# Patient Record
Sex: MALE | Race: WHITE | NOT HISPANIC OR LATINO | Employment: FULL TIME | URBAN - METROPOLITAN AREA
[De-identification: names, ages, dates, MRNs, and addresses within clinical notes are randomized per-mention and may not be internally consistent; named-entity substitution may affect disease eponyms.]

---

## 2017-01-03 ENCOUNTER — OFFICE VISIT (OUTPATIENT)
Dept: FAMILY MEDICINE | Facility: CLINIC | Age: 52
End: 2017-01-03
Payer: COMMERCIAL

## 2017-01-03 VITALS
BODY MASS INDEX: 33.77 KG/M2 | HEART RATE: 88 BPM | WEIGHT: 242 LBS | TEMPERATURE: 98.1 F | DIASTOLIC BLOOD PRESSURE: 86 MMHG | RESPIRATION RATE: 13 BRPM | SYSTOLIC BLOOD PRESSURE: 130 MMHG | OXYGEN SATURATION: 98 %

## 2017-01-03 DIAGNOSIS — M25.571 ACUTE RIGHT ANKLE PAIN: ICD-10-CM

## 2017-01-03 DIAGNOSIS — Z01.818 PREOP GENERAL PHYSICAL EXAM: Primary | ICD-10-CM

## 2017-01-03 DIAGNOSIS — M66.871 TIBIALIS POSTERIOR TENDON TEAR, NONTRAUMATIC, RIGHT: ICD-10-CM

## 2017-01-03 PROCEDURE — 99214 OFFICE O/P EST MOD 30 MIN: CPT | Performed by: NURSE PRACTITIONER

## 2017-01-03 NOTE — PROGRESS NOTES
[facility]  [address] [address]  [phone]  Dept: [phone]    PRE-OP EVALUATION:  Today's date: 1/3/2017    Daniel Schultz (: 1965) presents for pre-operative evaluation assessment as requested by Dr. Avinash Dick.  He requires evaluation and anesthesia risk assessment prior to undergoing surgery/procedure for treatment of torn tendons .  Proposed procedure: Right Ankle, arthoroscopy    Date of Surgery/ Procedure: 17  Time of Surgery/ Procedure: to be determined  Hospital/Surgical Facility: [facility]    Primary Physician: No Fierro  Type of Anesthesia Anticipated: local block    Patient has a Health Care Directive or Living Will:  NO    1. NO - Do you have a history of heart attack, stroke, stent, bypass or surgery on an artery in the head, neck, heart or legs?  2. NO - Do you ever have any pain or discomfort in your chest?  3. NO - Do you have a history of  Heart Failure?  4. NO - Are you troubled by shortness of breath when: walking on the level, up a slight hill or at night?  5. NO - Do you currently have a cold, bronchitis or other respiratory infection?  6. NO - Do you have a cough, shortness of breath or wheezing?  7. NO - Do you sometimes get pains in the calves of your legs when you walk?  8. NO - Do you or anyone in your family have previous history of blood clots?  9. NO - Do you or does anyone in your family have a serious bleeding problem such as prolonged bleeding following surgeries or cuts?  10. NO - Have you ever had problems with anemia or been told to take iron pills?  11. NO - Have you had any abnormal blood loss such as black, tarry or bloody stools, or abnormal vaginal bleeding?  12. NO - Have you ever had a blood transfusion?  13. NO - Have you or any of your relatives ever had problems with anesthesia?  14. NO - Do you have sleep apnea, excessive snoring or daytime drowsiness?  15. NO - Do you have any prosthetic heart  valves?  16. NO - Do you have prosthetic joints?  17. NO - Is there any chance that you may be pregnant?      HPI:                                                      Brief HPI related to upcoming procedure: right ankle pain ongoing for several years- within the past month having increase in pain.   MRI shows evidence of tearing along the posterior tibial tendon behind the medial malleolus.      See problem list for active medical problems.  Problems all longstanding and stable, except as noted/documented.  See ROS for pertinent symptoms related to these conditions.                                                                                                  .    MEDICAL HISTORY:                                                      Patient Active Problem List    Diagnosis Date Noted     Pre-diabetes 07/07/2015     Priority: Medium     JAZMIN (obstructive sleep apnea) 08/18/2015     Severe JAZMIN   - HST on 8/17/2015 with weight 240 lbs, AHI 34, chris desat 86%, strong positional component.  Events labeled as central suspected to be post-arousal and feel it is most consistent with JAZMIN       Nonalcoholic steatohepatitis (WILKINSON) 05/26/2015     Fatty liver seen on US.  Had mild transaminitis       Meniscus tear 09/25/2013     Hyperlipidemia LDL goal <160 12/21/2010      No past medical history on file.  Past Surgical History   Procedure Laterality Date     Hemorrhoidectomy  2000     Internal and external hemorrhoidectomy     Surgical history of -   6/2001     Left Achilles tendon repair     Arthroscopy knee with medial meniscectomy  9/27/2013     Procedure: ARTHROSCOPY KNEE WITH MEDIAL MENISCECTOMY;  Right Knee Arthroscopy Partial Menisectomy and Debridement,Left Knee Arthroscopy Debridement and Loose Body Removal--needs assist.;  Surgeon: Ley, Jeffrey Duane, MD;  Location: [facility] OR     Arthroscopy knee irrigation and debridement  9/27/2013     Procedure: ARTHROSCOPY KNEE IRRIGATION AND DEBRIDEMENT;;  Surgeon: Winston Escoto  Duane, MD;  Location: [facility] OR     Colonoscopy N/A 8/17/2016     Procedure: COLONOSCOPY;  Surgeon: Rudolph Nazario MD;  Location: [facility]     Current Outpatient Prescriptions   Medication Sig Dispense Refill     order for DME Equipment being ordered: CPAP Daniel Schultz received a [employer] DreamStation Auto CPAP Auto. Pressures were set at Auto 8 - 15 cm H2O.       order for DME CAM walker - short 1 Device 0     OTC products: None, except as noted above    Allergies   Allergen Reactions     Nkda [No Known Drug Allergies]       Latex Allergy: NO    Social History   Substance Use Topics     Smoking status: Never Smoker      Smokeless tobacco: Never Used     Alcohol Use: 0.0 oz/week     0 Standard drinks or equivalent per week      Comment: every other week     History   Drug Use No       REVIEW OF SYSTEMS:                                                    Constitutional, neuro, ENT, endocrine, pulmonary, cardiac, gastrointestinal, genitourinary, musculoskeletal, integument and psychiatric systems are negative, except as otherwise noted.    EXAM:                                                    /86 mmHg  Pulse 88  Temp(Src) 98.1  F (36.7  C) (Tympanic)  Resp 13  Wt 242 lb (109.77 kg)  SpO2 98%    GENERAL APPEARANCE: healthy, alert and no distress     EYES: EOMI, - PERRL     HENT: ear canals and TM's normal and nose and mouth without ulcers or lesions     NECK: no adenopathy, no asymmetry, masses, or scars and thyroid normal to palpation     RESP: lungs clear to auscultation - no rales, rhonchi or wheezes     CV: regular rates and rhythm, normal S1 S2, no S3 or S4 and no murmur, click or rub -     MS: extremities normal- no gross deformities noted, no evidence of inflammation in joints, FROM in all extremities.     SKIN: no suspicious lesions or rashes     NEURO: Normal strength and tone, sensory exam grossly normal, mentation intact and speech normal     PSYCH: mentation appears normal.  and affect normal/bright     LYMPHATICS: No axillary, cervical,or supraclavicular nodes    DIAGNOSTICS:                                                    EKG: Not indicated due to non-vascular surgery and low risk of event (age <65 and without cardiac risk factors)    Recent Labs   Lab Test  05/21/15   1004  05/16/15   1012  09/26/13   0900   HGB   --   15.5  15.3   PLT   --   216  213   NA  140  141   --    POTASSIUM  4.1  3.6   --    CR  1.12  1.04   --    A1C  6.1*   --    --         IMPRESSION:                                                    Reason for surgery/procedure: Right Ankle, arthoroscopy  Diagnosis/reason for consult: Preoperative physical     The proposed surgical procedure is considered INTERMEDIATE risk.    REVISED CARDIAC RISK INDEX  The patient has the following serious cardiovascular risks for perioperative complications such as (MI, PE, VFib and 3  AV Block):  No serious cardiac risks  INTERPRETATION: 0 risks: Class I (very low risk - 0.4% complication rate)    The patient has the following additional risks for perioperative complications:  No identified additional risks      ICD-10-CM    1. Preop general physical exam Z01.818    2. Acute right ankle pain M25.571    3. Tibialis posterior tendon tear, nontraumatic, right M66.871        RECOMMENDATIONS:                                                              APPROVAL GIVEN to proceed with proposed procedure, without further diagnostic evaluation       Signed Electronically by: ESPERANZA Acevedo CNP    Copy of this evaluation report is provided to requesting physician.    [facility] Preop Guidelines

## 2017-01-03 NOTE — PATIENT INSTRUCTIONS
Before Your Surgery      Call your surgeon if there is any change in your health. This includes signs of a cold or flu (such as a sore throat, runny nose, cough, rash or fever).    Do not smoke, drink alcohol or take over the counter medicine (unless your surgeon or primary care doctor tells you to) for the 24 hours before and after surgery.    If you take prescribed drugs: Follow your doctor s orders about which medicines to take and which to stop until after surgery.    Eating and drinking prior to surgery: follow the instructions from your surgeon    Take a shower or bath the night before surgery. Use the soap your surgeon gave you to gently clean your skin. If you do not have soap from your surgeon, use your regular soap. Do not shave or scrub the surgery site.  Wear clean pajamas and have clean sheets on your bed.     1. Do not take any aspirin or Ibuprofen/Aleve/Motrin before your surgery          Thank you for choosing [facility].  You may be receiving a survey in the mail from [employer] regarding your visit today.  Please take a few minutes to complete and return the survey to let us know how we are doing.      If you have questions or concerns, please contact us via [employer] or you can contact your care team at [phone].    Our Clinic hours are:  Monday 6:40 am  to 7:00 pm  Tuesday -Friday 6:40 am to 5:00 pm    The [place] outpatient lab hours are:  Monday - Friday 6:10 am to 4:45 pm  Saturdays 7:00 am to 11:00 am  Appointments are required, call [phone]    If you have clinical questions after hours or would like to schedule an appointment,  call the clinic at [phone].

## 2017-01-03 NOTE — MR AVS SNAPSHOT
After Visit Summary   1/3/2017    Daniel Schultz    MRN: [id number]           Patient Information     Date Of Birth          1965        Visit Information        Provider Department      1/3/2017 8:00 AM No Cordova APRN [facility]        Today's Diagnoses     Preop general physical exam    -  1       Care Instructions      Before Your Surgery      Call your surgeon if there is any change in your health. This includes signs of a cold or flu (such as a sore throat, runny nose, cough, rash or fever).    Do not smoke, drink alcohol or take over the counter medicine (unless your surgeon or primary care doctor tells you to) for the 24 hours before and after surgery.    If you take prescribed drugs: Follow your doctor s orders about which medicines to take and which to stop until after surgery.    Eating and drinking prior to surgery: follow the instructions from your surgeon    Take a shower or bath the night before surgery. Use the soap your surgeon gave you to gently clean your skin. If you do not have soap from your surgeon, use your regular soap. Do not shave or scrub the surgery site.  Wear clean pajamas and have clean sheets on your bed.     1. Do not take any aspirin or Ibuprofen/Aleve/Motrin before your surgery          Thank you for choosing [facility].  You may be receiving a survey in the mail from Carmine Castano regarding your visit today.  Please take a few minutes to complete and return the survey to let us know how we are doing.      If you have questions or concerns, please contact us via [employer] or you can contact your care team at [phone].    Our Clinic hours are:  Monday 6:40 am  to 7:00 pm  Tuesday -Friday 6:40 am to 5:00 pm    The [place] outpatient lab hours are:  Monday - Friday 6:10 am to 4:45 pm  Saturdays 7:00 am to 11:00 am  Appointments are required, call [phone]    If you have clinical questions after hours or would like to schedule an  appointment,  call the clinic at [phone].        Follow-ups after your visit        Your next 10 appointments already scheduled     Jan 05, 2017   Procedure with Avinash Dick DPM   [facility] (--)    [address] [address] [phone]           The medical center is located at [address] (between [address] and [address] in [place], [place] miles north of [place]).              Who to contact     If you have questions or need follow up information about today's clinic visit or your schedule please contact [facility] directly at [phone].  Normal or non-critical lab and imaging results will be communicated to you by tritruehart, letter or phone within 4 business days after the clinic has received the results. If you do not hear from us within 7 days, please contact the clinic through [employer] or phone. If you have a critical or abnormal lab result, we will notify you by phone as soon as possible.  Submit refill requests through [employer] or call your pharmacy and they will forward the refill request to us. Please allow 3 business days for your refill to be completed.          Additional Information About Your Visit        tritruehart Information     [employer] gives you secure access to your electronic health record. If you see a primary care provider, you can also send messages to your care team and make appointments. If you have questions, please call your primary care clinic.  If you do not have a primary care provider, please call [phone] and they will assist you.        Your Vitals Were     Pulse Temperature Respirations Pulse Oximetry          88 98.1  F (36.7  C) (Tympanic) 13 98%         Blood Pressure from Last 3 Encounters:   01/03/17 130/86   09/28/16 130/87   08/24/16 117/81    Weight from Last 3 Encounters:   01/03/17 242 lb (109.77 kg)   12/01/16 240 lb (108.863 kg)   10/24/16 240 lb (108.863 kg)              Today, you had the  following     No orders found for display       Primary Care Provider Office Phone # Fax #    No Fierro [phone]       [facility] [address] [address]        Thank you!     Thank you for choosing [facility]  for your care. Our goal is always to provide you with excellent care. Hearing back from our patients is one way we can continue to improve our services. Please take a few minutes to complete the written survey that you may receive in the mail after your visit with us. Thank you!             Your Updated Medication List - Protect others around you: Learn how to safely use, store and throw away your medicines at www.disposemymeds.org.          This list is accurate as of: 1/3/17  8:05 AM.  Always use your most recent med list.                   Brand Name Dispense Instructions for use    order for DME      Equipment being ordered: CPAP Daniel Schultz received a [employer] DreamStation Auto CPAP Auto. Pressures were set at Auto 8 - 15 cm H2O.       order for DME     1 Device    CAM walker - short

## 2017-01-03 NOTE — NURSING NOTE
"Initial /86 mmHg  Pulse 88  Temp(Src) 98.1  F (36.7  C) (Tympanic)  Resp 13  Wt 242 lb (109.77 kg)  SpO2 98% Estimated body mass index is 33.77 kg/(m^2) as calculated from the following:    Height as of 12/1/16: 5' 11\" (1.803 m).    Weight as of this encounter: 242 lb (109.77 kg). .    Martha Hernandez    "

## 2017-01-04 ENCOUNTER — ANESTHESIA EVENT (OUTPATIENT)
Dept: SURGERY | Facility: CLINIC | Age: 52
End: 2017-01-04
Payer: COMMERCIAL

## 2017-01-04 NOTE — ANESTHESIA PREPROCEDURE EVALUATION
Anesthesia Evaluation     . Pt has had prior anesthetic. Type: General and MAC      ROS/MED HX    ENT/Pulmonary:     (+)sleep apnea, , . .    Neurologic:       Cardiovascular:     (+) Dyslipidemia, ----. : . . . :. . Previous cardiac testing Echodate:results:Stress Testdate:5/29/15 results:Interpretation Summary  The patient exercised 10 minutes.  Normal resting wall motion and no stress-induced wall motion abnormality.  No evidence of inducible ischemia on stress test.  Limited views suggest mild right ventricular dilatation with normal RV  systolic function. RVSP cannot be estimated.  ______________________________________________________________________________     Stress  The patient exercised 10 minutes.  Normal blood pressure response to exercise.  The patient exhibited no chest pain during exercise.  Exercise was stopped due to fatigue.  The EKG portion of this stress test was negative for inducible ischemia (see  echo results below).  The visual ejection fraction is estimated at >70%.  Left ventricular cavity size decreases with exercise.  Global LV systolic function augments with exercise.  Normal resting wall motion and no stress-induced wall motion abnormality.  This was a normal stress echocardiogram.     Baseline  The patient is in normal sinus rhythm.  The visual ejection fraction is estimated at 60-65%.  No regional wall motion abnormalities noted.     Stress Results            Protocol:  MANUAL          Maximum Predicted HR:  170 bpm            Target HR: 145 bpm        % Maximum Predicted HR:  98 %     +---------+--------+----------+------+--------------------------------------+  :  Stage  :Duration:Heart Rate:  BPCom                  ment                :  :         :(mm:ss) :  (bpm)   :      :                                      :  +---------+--------+----------+------+--------------------------------------+  : Stage 1 :  3:00 11     0    :142/78:                                       :  +---------+--------+----------+------+--------------------------------------+  : Stage 2 :  3:00 12     2    :142/78:                                      :  +---------+--------+----------+------+--------------------------------------+  : Stage 3 :  3:00 14     6    :156/80:                                      :  +---------+--------+----------+------+--------------------------------------+  :  Peak   :  1:00 16     6    :164/80:Term-leg fatigue; [id number]; FAC-above:  +---------+--------+----------+------+--------------------------------------+  :Recovery :  6:00 92          :126/86:                                      :  +---------+--------+----------+------+--------------------------------------+            Stress Duration:  10:00 mm:ss *        Recovery Time: 6:00 mm:ss        Maximum Stress HR:   166 bpm *ME              TS:          16        LV  Left ventricular systolic function is normal. The visual ejection fraction is  estimated at 60-65%. No regional wall motion abnormalities noted.     RV  The right ventricle is mildly dilated. The right ventricular systolic  function is normal.     AV  The aortic valve is trileaflet. No aortic regurgitation is present. No aortic  stenosis is present. date: results: date: results:          METS/Exercise Tolerance:     Hematologic:         Musculoskeletal:         GI/Hepatic:     (+) liver disease (WILKINSON),       Renal/Genitourinary:         Endo:     (+) Other Endocrine Disorder prediabetes.      Psychiatric:         Infectious Disease:         Malignancy:         Other:                              Anesthesia Plan      History & Physical Review  History and physical reviewed and following examination; no interval change.    ASA Status:  2 .        Plan for General, MAC and Periph. Nerve Block for postop pain   PONV prophylaxis:  Ondansetron (or other 5HT-3) and Dexamethasone or Solumedrol       Postoperative Care  Postoperative pain management:  IV analgesics, Oral  pain medications and Peripheral nerve block (Single Shot).      Consents  Anesthetic plan, risks, benefits and alternatives discussed with:  Patient..                          .

## 2017-01-05 ENCOUNTER — ANESTHESIA (OUTPATIENT)
Dept: SURGERY | Facility: CLINIC | Age: 52
End: 2017-01-05
Payer: COMMERCIAL

## 2017-01-05 PROCEDURE — 25000564 ZZH DESFLURANE, EA 15 MIN: Performed by: NURSE ANESTHETIST, CERTIFIED REGISTERED

## 2017-01-05 PROCEDURE — 25000125 ZZHC RX 250: Performed by: PODIATRIST

## 2017-01-05 PROCEDURE — 25000125 ZZHC RX 250: Performed by: NURSE ANESTHETIST, CERTIFIED REGISTERED

## 2017-01-05 RX ORDER — FENTANYL CITRATE 50 UG/ML
INJECTION, SOLUTION INTRAMUSCULAR; INTRAVENOUS PRN
Status: DISCONTINUED | OUTPATIENT
Start: 2017-01-05 | End: 2017-01-05

## 2017-01-05 RX ORDER — ROPIVACAINE HYDROCHLORIDE 5 MG/ML
INJECTION, SOLUTION EPIDURAL; INFILTRATION; PERINEURAL PRN
Status: DISCONTINUED | OUTPATIENT
Start: 2017-01-05 | End: 2017-01-05

## 2017-01-05 RX ORDER — DEXAMETHASONE SODIUM PHOSPHATE 4 MG/ML
INJECTION, SOLUTION INTRA-ARTICULAR; INTRALESIONAL; INTRAMUSCULAR; INTRAVENOUS; SOFT TISSUE PRN
Status: DISCONTINUED | OUTPATIENT
Start: 2017-01-05 | End: 2017-01-05

## 2017-01-05 RX ORDER — LIDOCAINE HYDROCHLORIDE 10 MG/ML
INJECTION, SOLUTION INFILTRATION; PERINEURAL PRN
Status: DISCONTINUED | OUTPATIENT
Start: 2017-01-05 | End: 2017-01-05

## 2017-01-05 RX ORDER — PROPOFOL 10 MG/ML
INJECTION, EMULSION INTRAVENOUS PRN
Status: DISCONTINUED | OUTPATIENT
Start: 2017-01-05 | End: 2017-01-05

## 2017-01-05 RX ORDER — LIDOCAINE HYDROCHLORIDE 10 MG/ML
INJECTION, SOLUTION EPIDURAL; INFILTRATION; INTRACAUDAL; PERINEURAL PRN
Status: DISCONTINUED | OUTPATIENT
Start: 2017-01-05 | End: 2017-01-05

## 2017-01-05 RX ORDER — ONDANSETRON 2 MG/ML
INJECTION INTRAMUSCULAR; INTRAVENOUS PRN
Status: DISCONTINUED | OUTPATIENT
Start: 2017-01-05 | End: 2017-01-05

## 2017-01-05 RX ORDER — LIDOCAINE HCL/EPINEPHRINE/PF 2%-1:200K
VIAL (ML) INJECTION PRN
Status: DISCONTINUED | OUTPATIENT
Start: 2017-01-05 | End: 2017-01-05

## 2017-01-05 RX ADMIN — MIDAZOLAM HYDROCHLORIDE 2 MG: 1 INJECTION, SOLUTION INTRAMUSCULAR; INTRAVENOUS at 13:58

## 2017-01-05 RX ADMIN — LIDOCAINE HYDROCHLORIDE 3 ML: 10 INJECTION, SOLUTION EPIDURAL; INFILTRATION; INTRACAUDAL; PERINEURAL at 12:56

## 2017-01-05 RX ADMIN — LIDOCAINE HYDROCHLORIDE 50 MG: 10 INJECTION, SOLUTION INFILTRATION; PERINEURAL at 14:02

## 2017-01-05 RX ADMIN — PROPOFOL 200 MG: 10 INJECTION, EMULSION INTRAVENOUS at 14:02

## 2017-01-05 RX ADMIN — ROPIVACAINE HYDROCHLORIDE 15 ML: 5 INJECTION, SOLUTION EPIDURAL; INFILTRATION; PERINEURAL at 13:00

## 2017-01-05 RX ADMIN — ROPIVACAINE HYDROCHLORIDE 10 ML: 5 INJECTION, SOLUTION EPIDURAL; INFILTRATION; PERINEURAL at 13:03

## 2017-01-05 RX ADMIN — CEFAZOLIN SODIUM 2 G: 2 INJECTION, SOLUTION INTRAVENOUS at 14:00

## 2017-01-05 RX ADMIN — MIDAZOLAM HYDROCHLORIDE 1 MG: 1 INJECTION, SOLUTION INTRAMUSCULAR; INTRAVENOUS at 12:56

## 2017-01-05 RX ADMIN — LIDOCAINE HYDROCHLORIDE,EPINEPHRINE BITARTRATE 5 ML: 20; .005 INJECTION, SOLUTION EPIDURAL; INFILTRATION; INTRACAUDAL; PERINEURAL at 12:59

## 2017-01-05 RX ADMIN — MIDAZOLAM HYDROCHLORIDE 2 MG: 1 INJECTION, SOLUTION INTRAMUSCULAR; INTRAVENOUS at 12:53

## 2017-01-05 RX ADMIN — DEXAMETHASONE SODIUM PHOSPHATE 4 MG: 4 INJECTION, SOLUTION INTRAMUSCULAR; INTRAVENOUS at 14:07

## 2017-01-05 RX ADMIN — FENTANYL CITRATE 150 MCG: 50 INJECTION, SOLUTION INTRAMUSCULAR; INTRAVENOUS at 14:02

## 2017-01-05 RX ADMIN — ONDANSETRON 4 MG: 2 INJECTION INTRAMUSCULAR; INTRAVENOUS at 14:59

## 2017-01-05 RX ADMIN — FENTANYL CITRATE 100 MCG: 50 INJECTION, SOLUTION INTRAMUSCULAR; INTRAVENOUS at 12:53

## 2017-01-05 RX ADMIN — ROPIVACAINE HYDROCHLORIDE 15 ML: 5 INJECTION, SOLUTION EPIDURAL; INFILTRATION; PERINEURAL at 13:01

## 2017-01-05 NOTE — ANESTHESIA PROCEDURE NOTES
Peripheral nerve/Neuraxial procedure note : Saphenous, Sciatic and Popliteal  Pre-Procedure  Performed by  COSME VALENZUELA   Location: pre-op      Pre-Anesthestic Checklist: patient identified, IV checked, site marked, risks and benefits discussed, informed consent, monitors and equipment checked, pre-op evaluation, at physician/surgeon's request and post-op pain management    Timeout  Correct Patient: Yes   Correct Procedure: Yes   Correct Site: Yes   Correct Laterality: Yes   Correct Position: Yes   Site Marked: Yes   .   Procedure Documentation    Diagnosis:POST OP PAIN.    Procedure:    Saphenous, Sciatic and Popliteal.  Local skin infiltrated with 3 mL of 1% lidocaine.     Ultrasound used to identify targeted nerve, plexus, or vascular marker and placed a needle adjacent to it. A permanent image is entered into the patient's record.  Patient Prep;mask, sterile gloves, chlorhexidine gluconate and isopropyl alcohol, patient draped.  Nerve Stim: Initial Level 1 mA.  Lowest motor response 0.38 mA..  Needle: insulated (21 G. 4 in). .  Spinal Needle: . . Insertion Method: Single Shot.     Assessment/Narrative  Paresthesias: No.  Injection made incrementally with aspirations every 5 mL..  The placement was negative for: blood aspirated, painful injection and site bleeding.  Bolus given via needle..   Secured via.   Complications: none. Test dose of 5 mL lidocaine 2% w/ 1:200,000 epinephrine at 12:59.  Test dose negative for signs of intravascular, subdural or intrathecal injection.

## 2017-01-05 NOTE — ANESTHESIA POSTPROCEDURE EVALUATION
Patient: Daniel Schultz    COMBINED ARTHROSCOPY ANKLE, OPEN REPAIR LIGAMENT (Right Ankle)  Additional InformationProcedure(s):  Right Ankle Arthroscopic Debridement,Lateral Ligament Repair,Posterior Tibial Tendon Repair - Wound Class: I-Clean    Diagnosis:right ankle instability,impingement  Diagnosis Additional Information: No value filed.    Anesthesia Type:  General, MAC, Periph. Nerve Block for postop pain    Note:  Anesthesia Post Evaluation    Patient location during evaluation: Phase 2 and Bedside  Patient participation: Able to participate in evaluation but full recovery from regional anesthesia has not yet ocurrred but is anticipated to occur within 48 hours  Level of consciousness: awake and alert  Pain management: adequate  Airway patency: patent  Cardiovascular status: acceptable  Respiratory status: acceptable  Hydration status: acceptable  PONV: none     Anesthetic complications: None          Last vitals:  Filed Vitals:    01/05/17 1545 01/05/17 1611 01/05/17 1645   BP: 138/85 137/87 139/90   Pulse:  85 84   Temp:      Resp: 16 16 16   SpO2: 98% 99% 98%       Electronically Signed By: ESPERANZA Capone CRNA  January 5, 2017  5:27 PM

## 2017-01-05 NOTE — ANESTHESIA CARE TRANSFER NOTE
Patient: Daniel Schultz    COMBINED ARTHROSCOPY ANKLE, OPEN REPAIR LIGAMENT (Right Ankle)  Additional InformationProcedure(s):  Right Ankle Arthroscopic Debridement,Lateral Ligament Repair,Posterior Tibial Tendon Repair - Wound Class: I-Clean    Diagnosis: right ankle instability,impingement  Diagnosis Additional Information: No value filed.    Anesthesia Type:   General, MAC, Periph. Nerve Block for postop pain     Note:  Airway :Nasal Cannula  Patient transferred to:PACU        Vitals: (Last set prior to Anesthesia Care Transfer)              Electronically Signed By: ESPERANZA Pace CRNA  January 5, 2017  3:25 PM

## 2017-01-19 ENCOUNTER — TRANSFERRED RECORDS (OUTPATIENT)
Dept: HEALTH INFORMATION MANAGEMENT | Facility: CLINIC | Age: 52
End: 2017-01-19

## 2017-02-15 ENCOUNTER — HOSPITAL ENCOUNTER (OUTPATIENT)
Dept: PHYSICAL THERAPY | Facility: CLINIC | Age: 52
Setting detail: THERAPIES SERIES
End: 2017-02-15
Attending: PODIATRIST
Payer: COMMERCIAL

## 2017-02-15 PROCEDURE — 97140 MANUAL THERAPY 1/> REGIONS: CPT | Mod: GP | Performed by: PHYSICAL THERAPIST

## 2017-02-15 PROCEDURE — 97161 PT EVAL LOW COMPLEX 20 MIN: CPT | Mod: GP | Performed by: PHYSICAL THERAPIST

## 2017-02-15 PROCEDURE — 97110 THERAPEUTIC EXERCISES: CPT | Mod: GP | Performed by: PHYSICAL THERAPIST

## 2017-02-15 PROCEDURE — 40000718 ZZHC STATISTIC PT DEPARTMENT ORTHO VISIT: Performed by: PHYSICAL THERAPIST

## 2017-02-15 NOTE — PROGRESS NOTES
02/15/17 0800   General Information   Type of Visit Initial OP Ortho PT Evaluation   Start of Care Date 02/15/17   Referring Physician Dr. 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   Orders Evaluate and Treat   Date of Order 02/13/17   Insurance Type [employer]   Insurance Comments/Visits Authorized send auth 2 w prior to 21st visit   Medical Diagnosis Post op R Tibialis Posterior Tendon Repair   Surgical/Medical history reviewed Yes   Precautions/Limitations no known precautions/limitations   Weight-Bearing Status - RLE weight-bearing as tolerated   Body Part(s)   Body Part(s) Ankle/Foot   Presentation and Etiology   Pertinent history of current problem (include personal factors and/or comorbidities that impact the POC) Pt post op R Posterior Tendon Repair from 1/5/2017. Pt currently WBAT on RLE. 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 Pt states he saw Mayelin this past Monday, cleared for PT. d/c boot and just wearing the brace within his sneaker. Pt works as a , working mostly at a desk, however does have to walk when at scenes. 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   Impairments A. Pain;B. Decreased WB tolerance;C. Swelling;D. Decreased ROM;F. Decreased strength and endurance;E. Decreased flexibility;H. Impaired gait;K. Numbness   Functional Limitations perform activities of daily living;perform required work activities;perform desired leisure / sports activities   Symptom Location posterior ankle pain   How/Where did it occur Other  (post operative)   Onset date of current episode/exacerbation 01/05/17   Chronicity New   Pain rating (0-10 point scale) Best (/10);Worst (/10)   Best (/10) 2   Worst (/10) 6   Pain quality A. Sharp;B. Dull;C. Aching   Frequency of pain/symptoms A. Constant   Pain/symptoms exacerbated by B. Walking;G. Certain positions;J. ADL;K. Home tasks;L. Work tasks    Pain/symptoms eased by C. Rest;E. Changing positions;G. Heat;H. Cold;J. Braces/supports   Prior Level of Function   Prior Level of Function-Mobility Pt was able to ambulate without an assistive device or braces. Pt was also able to ambulate with being limited secondary to pain.   Current Level of Function   Patient role/employment history A. Employed   Employment Comments Pt states most of the time he works at a desk, pt states he does have to go to scenes and drive around. Pt states he went back to work right away.    Living environment House/townhome   Home/community accessibility a flight, split entry home. Pt states no concerns with ascending/ descending stairs.   Fall Risk Screen   Fall screen completed by PT   Per patient - Fall 2 or more times in past year? No   Per patient - Fall with injury in past year? No   Is patient a fall risk? No   Ankle/Foot Objective Findings   Side (if bilateral, select both right and left) Right   Gait/Locomotion Pt ambulates with lateral lean towards the right, decreased foot clearance and step length on the R   Right DF (Knee Ext) AROM 3* R. 5* L   Right PF AROM 39* R.  45L   Right Calcanceal Inversion AROM 20*   Right Calcaneal Eversion AROM 22*   Right DF/Inversion Strength 4-/5   Right DF/Eversion Strength 4-/5; increased pain   Right PF/Inversion Strength 4-/5; increased pain   Right PF/Eversion Strength 4-/5; increased pain   Right PF Strength 4-/5; increased pain   Palpation Pain upon palpation of surgical incision on lateral aspect of foot.   Integumentary 57 cm R. 56 cm L w/ figure 8 measure. 25cm R in toe box, 24 cm L   Right Great Toe Flexion AROM 15*   Accessory Motion/Joint Mobility Increased tightness in talocrural joint.   Planned Therapy Interventions   Planned Therapy Interventions IADL retraining;balance training;gait training;joint mobilization;manual therapy;neuromuscular re-education;ROM;strengthening;stretching   Clinical Impression   Criteria for Skilled  Therapeutic Interventions Met yes, treatment indicated   PT Diagnosis Decreased Strength and ROM secondary to post op Posterior Tibialis Tendon Repair   Influenced by the following impairments decreased ROM, decreased strength   Functional limitations due to impairments decreased independent mobility, decreased tolerance for standing and ambulating   Clinical Presentation Stable/Uncomplicated   Clinical Presentation Rationale Pt presents with decreased ROM and Strength consisntet with post operative status. Pt presents with stable comorbidities and is motivated to participate with PT   Clinical Decision Making (Complexity) Low complexity   Therapy Frequency 2 times/Week   Predicted Duration of Therapy Intervention (days/wks) 6 weeks   Risk & Benefits of therapy have been explained Yes   Patient, Family & other staff in agreement with plan of care Yes   Education Assessment   Preferred Learning Style Pictures/video   Barriers to Learning No barriers   ORTHO GOALS   PT Ortho Eval Goals 1;2;3   Ortho Goal 1   Goal Identifier HEP   Goal Description Pt will be able to demonstrate proper form and duration of exercises in order to progress independence with HEP   Target Date 03/15/17   Ortho Goal 2   Goal Identifier Strength   Goal Description Pt will demonstrate atleast 4+/5 strength in order to ambulate without gait deviations.   Target Date 03/29/17   Ortho Goal 3   Goal Identifier Work, coaching   Goal Description Pt will be able to stand and walk > 1 hour in order to participate with work activities, walking and ambulating with <2/10 pain during mobility.   Target Date 03/29/17   Total Evaluation Time   Total Evaluation Time 30

## 2017-02-21 ENCOUNTER — HOSPITAL ENCOUNTER (OUTPATIENT)
Dept: PHYSICAL THERAPY | Facility: CLINIC | Age: 52
Setting detail: THERAPIES SERIES
End: 2017-02-21
Attending: PODIATRIST
Payer: COMMERCIAL

## 2017-02-21 PROCEDURE — 97110 THERAPEUTIC EXERCISES: CPT | Mod: GP | Performed by: PHYSICAL THERAPIST

## 2017-02-21 PROCEDURE — 97140 MANUAL THERAPY 1/> REGIONS: CPT | Mod: GP | Performed by: PHYSICAL THERAPIST

## 2017-02-21 PROCEDURE — 40000718 ZZHC STATISTIC PT DEPARTMENT ORTHO VISIT: Performed by: PHYSICAL THERAPIST

## 2017-02-23 ENCOUNTER — HOSPITAL ENCOUNTER (OUTPATIENT)
Dept: PHYSICAL THERAPY | Facility: CLINIC | Age: 52
Setting detail: THERAPIES SERIES
End: 2017-02-23
Attending: PODIATRIST
Payer: COMMERCIAL

## 2017-02-23 PROCEDURE — 97140 MANUAL THERAPY 1/> REGIONS: CPT | Mod: GP | Performed by: PHYSICAL THERAPIST

## 2017-02-23 PROCEDURE — 97112 NEUROMUSCULAR REEDUCATION: CPT | Mod: GP | Performed by: PHYSICAL THERAPIST

## 2017-02-23 PROCEDURE — 40000718 ZZHC STATISTIC PT DEPARTMENT ORTHO VISIT: Performed by: PHYSICAL THERAPIST

## 2017-02-27 ENCOUNTER — HOSPITAL ENCOUNTER (OUTPATIENT)
Dept: PHYSICAL THERAPY | Facility: CLINIC | Age: 52
Setting detail: THERAPIES SERIES
End: 2017-02-27
Attending: PODIATRIST
Payer: COMMERCIAL

## 2017-02-27 PROCEDURE — 40000718 ZZHC STATISTIC PT DEPARTMENT ORTHO VISIT: Performed by: PHYSICAL THERAPIST

## 2017-02-27 PROCEDURE — 97112 NEUROMUSCULAR REEDUCATION: CPT | Mod: GP | Performed by: PHYSICAL THERAPIST

## 2017-02-27 PROCEDURE — 97140 MANUAL THERAPY 1/> REGIONS: CPT | Mod: GP | Performed by: PHYSICAL THERAPIST

## 2017-03-01 ENCOUNTER — HOSPITAL ENCOUNTER (OUTPATIENT)
Dept: PHYSICAL THERAPY | Facility: CLINIC | Age: 52
Setting detail: THERAPIES SERIES
End: 2017-03-01
Attending: PODIATRIST
Payer: COMMERCIAL

## 2017-03-01 PROCEDURE — 97112 NEUROMUSCULAR REEDUCATION: CPT | Mod: GP | Performed by: PHYSICAL THERAPIST

## 2017-03-01 PROCEDURE — 40000718 ZZHC STATISTIC PT DEPARTMENT ORTHO VISIT: Performed by: PHYSICAL THERAPIST

## 2017-03-01 PROCEDURE — 97110 THERAPEUTIC EXERCISES: CPT | Mod: GP | Performed by: PHYSICAL THERAPIST

## 2017-03-23 ENCOUNTER — HOSPITAL ENCOUNTER (OUTPATIENT)
Dept: PHYSICAL THERAPY | Facility: CLINIC | Age: 52
Setting detail: THERAPIES SERIES
End: 2017-03-23
Attending: PODIATRIST
Payer: COMMERCIAL

## 2017-03-23 PROCEDURE — 97112 NEUROMUSCULAR REEDUCATION: CPT | Mod: GP | Performed by: PHYSICAL THERAPIST

## 2017-03-23 PROCEDURE — 40000718 ZZHC STATISTIC PT DEPARTMENT ORTHO VISIT: Performed by: PHYSICAL THERAPIST

## 2017-03-23 PROCEDURE — 97140 MANUAL THERAPY 1/> REGIONS: CPT | Mod: GP | Performed by: PHYSICAL THERAPIST

## 2017-03-28 ENCOUNTER — HOSPITAL ENCOUNTER (OUTPATIENT)
Dept: PHYSICAL THERAPY | Facility: CLINIC | Age: 52
Setting detail: THERAPIES SERIES
End: 2017-03-28
Attending: PODIATRIST
Payer: COMMERCIAL

## 2017-03-28 PROCEDURE — 97112 NEUROMUSCULAR REEDUCATION: CPT | Mod: GP | Performed by: PHYSICAL THERAPIST

## 2017-03-28 PROCEDURE — 97110 THERAPEUTIC EXERCISES: CPT | Mod: GP | Performed by: PHYSICAL THERAPIST

## 2017-03-28 PROCEDURE — 40000718 ZZHC STATISTIC PT DEPARTMENT ORTHO VISIT: Performed by: PHYSICAL THERAPIST

## 2017-04-19 ENCOUNTER — HOSPITAL ENCOUNTER (OUTPATIENT)
Dept: PHYSICAL THERAPY | Facility: CLINIC | Age: 52
Setting detail: THERAPIES SERIES
End: 2017-04-19
Attending: PODIATRIST
Payer: COMMERCIAL

## 2017-04-19 PROCEDURE — 40000718 ZZHC STATISTIC PT DEPARTMENT ORTHO VISIT: Performed by: PHYSICAL THERAPIST

## 2017-04-19 PROCEDURE — 97112 NEUROMUSCULAR REEDUCATION: CPT | Mod: GP | Performed by: PHYSICAL THERAPIST

## 2017-04-19 PROCEDURE — 97110 THERAPEUTIC EXERCISES: CPT | Mod: GP | Performed by: PHYSICAL THERAPIST

## 2017-06-08 ENCOUNTER — OFFICE VISIT (OUTPATIENT)
Dept: FAMILY MEDICINE | Facility: CLINIC | Age: 52
End: 2017-06-08
Payer: COMMERCIAL

## 2017-06-08 VITALS
WEIGHT: 218 LBS | SYSTOLIC BLOOD PRESSURE: 123 MMHG | DIASTOLIC BLOOD PRESSURE: 86 MMHG | TEMPERATURE: 96.7 F | HEIGHT: 71 IN | HEART RATE: 71 BPM | BODY MASS INDEX: 30.52 KG/M2

## 2017-06-08 DIAGNOSIS — M79.675 PAIN OF TOE OF LEFT FOOT: Primary | ICD-10-CM

## 2017-06-08 DIAGNOSIS — R73.03 PREDIABETES: ICD-10-CM

## 2017-06-08 LAB
CRP SERPL-MCNC: 7.7 MG/L (ref 0–8)
HBA1C MFR BLD: 5.8 % (ref 4.3–6)
URATE SERPL-MCNC: 7.5 MG/DL (ref 3.5–7.2)
WBC # BLD AUTO: 6.2 10E9/L (ref 4–11)

## 2017-06-08 PROCEDURE — 36415 COLL VENOUS BLD VENIPUNCTURE: CPT | Performed by: NURSE PRACTITIONER

## 2017-06-08 PROCEDURE — 86140 C-REACTIVE PROTEIN: CPT | Performed by: NURSE PRACTITIONER

## 2017-06-08 PROCEDURE — 84550 ASSAY OF BLOOD/URIC ACID: CPT | Performed by: NURSE PRACTITIONER

## 2017-06-08 PROCEDURE — 83036 HEMOGLOBIN GLYCOSYLATED A1C: CPT | Performed by: NURSE PRACTITIONER

## 2017-06-08 PROCEDURE — 99214 OFFICE O/P EST MOD 30 MIN: CPT | Performed by: NURSE PRACTITIONER

## 2017-06-08 PROCEDURE — 85048 AUTOMATED LEUKOCYTE COUNT: CPT | Performed by: NURSE PRACTITIONER

## 2017-06-08 RX ORDER — PREDNISONE 10 MG/1
30 TABLET ORAL DAILY
Qty: 15 TABLET | Refills: 0 | Status: SHIPPED | OUTPATIENT
Start: 2017-06-08 | End: 2017-06-13

## 2017-06-08 RX ORDER — COLCHICINE 0.6 MG/1
TABLET ORAL
Qty: 30 TABLET | Refills: 0 | Status: SHIPPED | OUTPATIENT
Start: 2017-06-08 | End: 2018-04-23

## 2017-06-08 NOTE — NURSING NOTE
"Chief Complaint   Patient presents with     Toe Pain     x2 weeks, no known injury.        Initial /86  Pulse 71  Temp 96.7  F (35.9  C) (Tympanic)  Ht 5' 11\" (1.803 m)  Wt 218 lb (98.9 kg)  BMI 30.4 kg/m2 Estimated body mass index is 30.4 kg/(m^2) as calculated from the following:    Height as of this encounter: 5' 11\" (1.803 m).    Weight as of this encounter: 218 lb (98.9 kg).  Medication Reconciliation: complete  "

## 2017-06-08 NOTE — MR AVS SNAPSHOT
After Visit Summary   6/8/2017    Daniel Schultz    MRN: [id number]           Patient Information     Date Of Birth          1965        Visit Information        Provider Department      6/8/2017 9:00 AM Rosa Locke APRN [facility]        Today's Diagnoses     Pain of toe of left foot    -  1    Prediabetes          Care Instructions    Colchicine       -2 tabs at the onset of flare, then 1 tab every 2 hrs until pain is better or diarrhea occurs, up to 10 doses. Wait 3 days until taking again.   If no improvement in 2 days start Prednisone 30 mg daily, take it with food.     Labs: WBC, CRP (inflammatory marker) and uric acid                  Follow-ups after your visit        Who to contact     If you have questions or need follow up information about today's clinic visit or your schedule please contact [facility] directly at [phone].  Normal or non-critical lab and imaging results will be communicated to you by MyChart, letter or phone within 4 business days after the clinic has received the results. If you do not hear from us within 7 days, please contact the clinic through [facility] or phone. If you have a critical or abnormal lab result, we will notify you by phone as soon as possible.  Submit refill requests through [facility] or call your pharmacy and they will forward the refill request to us. Please allow 3 business days for your refill to be completed.          Additional Information About Your Visit        MyChart Information     [facility] gives you secure access to your electronic health record. If you see a primary care provider, you can also send messages to your care team and make appointments. If you have questions, please call your primary care clinic.  If you do not have a primary care provider, please call [phone] and they will assist you.        Care EveryWhere ID     This is your Care EveryWhere ID. This could be used by other  "organizations to access your [place] medical records  [id number]        Your Vitals Were     Pulse Temperature Height BMI (Body Mass Index)          71 96.7  F (35.9  C) (Tympanic) 5' 11\" (1.803 m) 30.4 kg/m2         Blood Pressure from Last 3 Encounters:   06/08/17 123/86   01/05/17 139/90   01/03/17 130/86    Weight from Last 3 Encounters:   06/08/17 218 lb (98.9 kg)   01/03/17 242 lb (109.8 kg)   12/01/16 240 lb (108.9 kg)              We Performed the Following     CRP inflammation     Hemoglobin A1c     Uric acid     WBC count          Today's Medication Changes          These changes are accurate as of: 6/8/17  9:27 AM.  If you have any questions, ask your nurse or doctor.               Start taking these medicines.        Dose/Directions    colchicine 0.6 MG tablet   Commonly known as:  COLCRYS   Used for:  Pain of toe of left foot   Started by:  Rosa Locke APRN CNP        2 tabs at the onset of flare, then 1 tab every 2 hrs until pain is better or diarrhea occurs, up to 10 doses. Wait 3 days until taking again   Quantity:  30 tablet   Refills:  0       predniSONE 10 MG tablet   Commonly known as:  DELTASONE   Used for:  Pain of toe of left foot   Started by:  Rosa Locke APRN CNP        Dose:  30 mg   Take 3 tablets (30 mg) by mouth daily for 5 days   Quantity:  15 tablet   Refills:  0            Where to get your medicines      These medications were sent to [facility] [address]  [address] [address]     Phone:  [phone]     colchicine 0.6 MG tablet    predniSONE 10 MG tablet                Primary Care Provider Office Phone # Fax #    No Mile Fierro [phone]       [facility] [address] [address]        Thank you!     Thank you for choosing [facility]  for your care. Our goal is always to provide you with excellent care. Hearing back from our patients " is one way we can continue to improve our services. Please take a few minutes to complete the written survey that you may receive in the mail after your visit with us. Thank you!             Your Updated Medication List - Protect others around you: Learn how to safely use, store and throw away your medicines at www.disposemymeds.org.          This list is accurate as of: 6/8/17  9:27 AM.  Always use your most recent med list.                   Brand Name Dispense Instructions for use    colchicine 0.6 MG tablet    COLCRYS    30 tablet    2 tabs at the onset of flare, then 1 tab every 2 hrs until pain is better or diarrhea occurs, up to 10 doses. Wait 3 days until taking again       order for DME      Equipment being ordered: CPAP Daniel Schultz received a [employer]pir[employer] DreamStation Auto CPAP Auto. Pressures were set at Auto 8 - 15 cm H2O.       order for DME     1 Device    CAM walker - short       predniSONE 10 MG tablet    DELTASONE    15 tablet    Take 3 tablets (30 mg) by mouth daily for 5 days

## 2017-06-08 NOTE — PROGRESS NOTES
"  SUBJECTIVE:                                                    Daniel Schultz is a 52 year old male who presents to clinic today for the following health issues: pain in left great toe for about 2 weeks, had similar pain before but it went away on its own in 2 days.     Concern - Left big toe Pain      Onset: 2 weeks     Description:   Swollen, hurts to apply pressure on it, keeps him awake at night     Intensity: 9/10    Progression of Symptoms:  worsening    Accompanying Signs & Symptoms:         Previous history of similar problem:   Had some pain before in the toe before but it went away after 2 days     Precipitating factors:   Worsened by: Walking    Alleviating factors:  Improved by: Nothing        Therapies Tried and outcome: Cherry tarte     Problem list and histories reviewed & adjusted, as indicated.  Additional history: as documented    Labs reviewed in EPIC    Reviewed and updated as needed this visit by clinical staff  Tobacco  Allergies  Med Hx  Surg Hx  Fam Hx  Soc Hx      Reviewed and updated as needed this visit by Provider         ROS:  Constitutional, HEENT, cardiovascular, pulmonary, gi and gu systems are negative, except as otherwise noted.    OBJECTIVE:                                                    /86  Pulse 71  Temp 96.7  F (35.9  C) (Tympanic)  Ht 5' 11\" (1.803 m)  Wt 218 lb (98.9 kg)  BMI 30.4 kg/m2  Body mass index is 30.4 kg/(m^2).  GENERAL: healthy, alert and no distress  MS: left great toe MTP joint moderate swelling and erythema, warm to touch.   PSYCH: mentation appears normal, affect normal/bright    Diagnostic Test Results:  WBC, CRP, uric acid, X1B-oeycybs      ASSESSMENT/PLAN:                                                      1. Pain of toe of left foot  -symptoms are consistent with gout flare  - WBC count  - CRP inflammation  - Uric acid  - colchicine (COLCRYS) 0.6 MG tablet; 2 tabs at the onset of flare, then 1 tab every 2 hrs until pain is better " or diarrhea occurs, up to 10 doses. Wait 3 days until taking again  Dispense: 30 tablet; Refill: 0  -if no improvement with colchicine in 2-3 days start Prednisone   - predniSONE (DELTASONE) 10 MG tablet; Take 3 tablets (30 mg) by mouth daily for 5 days  Dispense: 15 tablet; Refill: 0    2. Prediabetes  - Hemoglobin A1c  -A1C improved  -continue with diet and exercise    See Patient Instructions    ESPERANZA [facility]

## 2017-06-08 NOTE — PATIENT INSTRUCTIONS
Colchicine       -2 tabs at the onset of flare, then 1 tab every 2 hrs until pain is better or diarrhea occurs, up to 10 doses. Wait 3 days until taking again.   If no improvement in 2 days start Prednisone 30 mg daily, take it with food.     Labs: WBC, CRP (inflammatory marker) and uric acid

## 2017-10-12 ENCOUNTER — TRANSFERRED RECORDS (OUTPATIENT)
Dept: HEALTH INFORMATION MANAGEMENT | Facility: CLINIC | Age: 52
End: 2017-10-12

## 2017-10-17 ENCOUNTER — HOSPITAL ENCOUNTER (OUTPATIENT)
Dept: MRI IMAGING | Facility: CLINIC | Age: 52
Discharge: HOME | End: 2017-10-17
Attending: PODIATRIST | Admitting: PODIATRIST
Payer: COMMERCIAL

## 2017-10-17 DIAGNOSIS — M25.571 RIGHT ANKLE PAIN, UNSPECIFIED CHRONICITY: ICD-10-CM

## 2017-10-17 PROCEDURE — 73721 MRI JNT OF LWR EXTRE W/O DYE: CPT | Mod: RT

## 2017-10-26 ENCOUNTER — TRANSFERRED RECORDS (OUTPATIENT)
Dept: HEALTH INFORMATION MANAGEMENT | Facility: CLINIC | Age: 52
End: 2017-10-26

## 2018-01-11 ENCOUNTER — OFFICE VISIT (OUTPATIENT)
Dept: FAMILY MEDICINE | Facility: CLINIC | Age: 53
End: 2018-01-11
Payer: COMMERCIAL

## 2018-01-11 VITALS
HEART RATE: 77 BPM | WEIGHT: 223 LBS | HEIGHT: 70 IN | TEMPERATURE: 97.8 F | BODY MASS INDEX: 31.92 KG/M2 | DIASTOLIC BLOOD PRESSURE: 80 MMHG | SYSTOLIC BLOOD PRESSURE: 121 MMHG

## 2018-01-11 DIAGNOSIS — Z01.818 PREOP GENERAL PHYSICAL EXAM: Primary | ICD-10-CM

## 2018-01-11 LAB
BASOPHILS # BLD AUTO: 0.1 10E9/L (ref 0–0.2)
BASOPHILS NFR BLD AUTO: 0.7 %
CREAT SERPL-MCNC: 0.98 MG/DL (ref 0.66–1.25)
DIFFERENTIAL METHOD BLD: NORMAL
EOSINOPHIL # BLD AUTO: 0.4 10E9/L (ref 0–0.7)
EOSINOPHIL NFR BLD AUTO: 5.6 %
ERYTHROCYTE [DISTWIDTH] IN BLOOD BY AUTOMATED COUNT: 12.4 % (ref 10–15)
GFR SERPL CREATININE-BSD FRML MDRD: 80 ML/MIN/1.7M2
HCT VFR BLD AUTO: 44.9 % (ref 40–53)
HGB BLD-MCNC: 15.5 G/DL (ref 13.3–17.7)
LYMPHOCYTES # BLD AUTO: 2.3 10E9/L (ref 0.8–5.3)
LYMPHOCYTES NFR BLD AUTO: 30.1 %
MCH RBC QN AUTO: 31.1 PG (ref 26.5–33)
MCHC RBC AUTO-ENTMCNC: 34.5 G/DL (ref 31.5–36.5)
MCV RBC AUTO: 90 FL (ref 78–100)
MONOCYTES # BLD AUTO: 0.7 10E9/L (ref 0–1.3)
MONOCYTES NFR BLD AUTO: 9.8 %
NEUTROPHILS # BLD AUTO: 4.1 10E9/L (ref 1.6–8.3)
NEUTROPHILS NFR BLD AUTO: 53.8 %
PLATELET # BLD AUTO: 232 10E9/L (ref 150–450)
RBC # BLD AUTO: 4.99 10E12/L (ref 4.4–5.9)
WBC # BLD AUTO: 7.6 10E9/L (ref 4–11)

## 2018-01-11 PROCEDURE — 99214 OFFICE O/P EST MOD 30 MIN: CPT | Performed by: FAMILY MEDICINE

## 2018-01-11 PROCEDURE — 85025 COMPLETE CBC W/AUTO DIFF WBC: CPT | Performed by: FAMILY MEDICINE

## 2018-01-11 PROCEDURE — 93000 ELECTROCARDIOGRAM COMPLETE: CPT | Performed by: FAMILY MEDICINE

## 2018-01-11 PROCEDURE — 82565 ASSAY OF CREATININE: CPT | Performed by: FAMILY MEDICINE

## 2018-01-11 PROCEDURE — 36415 COLL VENOUS BLD VENIPUNCTURE: CPT | Performed by: FAMILY MEDICINE

## 2018-01-11 NOTE — PROGRESS NOTES
[facility]  [address] [address]  [phone]  Dept: [phone]    PRE-OP EVALUATION:  Today's date: 2018    Daniel Schultz (: 1965) presents for pre-operative evaluation assessment as requested by Dr. Dick.  He requires evaluation and anesthesia risk assessment prior to undergoing surgery/procedure for treatment of a tendon abnormality in the right ankle.  Proposed procedure: Right Ankle Posterior Tibial Tendon Repair,Peroneus Brevis Repair.    Date of Surgery/ Procedure: 18  Time of Surgery/ Procedure: 1:40 pm per [facility]/Surgical Facility: [facility]  Fax number for surgical facility: No fax needed, able to view in [facility].  Primary Physician: No Fierro.  Dr. Reuben Rodriguez did the pre-op physical.  Type of Anesthesia Anticipated: Combined general with block.    Patient has a Health Care Directive or Living Will:  NO    1. NO - Do you have a history of heart attack, stroke, stent, bypass or surgery on an artery in the head, neck, heart or legs?  2. NO - Do you ever have any pain or discomfort in your chest?  3. NO - Do you have a history of  Heart Failure?  4. NO - Are you troubled by shortness of breath when: walking on the level, up a slight hill or at night?  5. NO - Do you currently have a cold, bronchitis or other respiratory infection?  6. NO - Do you have a cough, shortness of breath or wheezing?  7. NO - Do you sometimes get pains in the calves of your legs when you walk?  8. NO - Do you or anyone in your family have previous history of blood clots?  9. NO - Do you or does anyone in your family have a serious bleeding problem such as prolonged bleeding following surgeries or cuts?  10. NO - Have you ever had problems with anemia or been told to take iron pills?  11. NO - Have you had any abnormal blood loss such as black, tarry or bloody stools, or abnormal vaginal bleeding?  12. NO - Have you ever had a blood transfusion?  13. NO  - Have you or any of your relatives ever had problems with anesthesia?  14. NO - Do you have sleep apnea, excessive snoring or daytime drowsiness?  15. NO - Do you have any prosthetic heart valves?  16. NO - Do you have prosthetic joints?      HPI:                                                      Brief HPI related to upcoming procedure: see above      See problem list for active medical problems.  Problems all longstanding and stable, except as noted/documented.  See ROS for pertinent symptoms related to these conditions.                                                                                                  .    MEDICAL HISTORY:                                                    Patient Active Problem List    Diagnosis Date Noted     JAZMIN (obstructive sleep apnea) 08/18/2015     Priority: Medium     Severe JAZMIN   - HST on 8/17/2015 with weight 240 lbs, AHI 34, chris desat 86%, strong positional component.  Events labeled as central suspected to be post-arousal and feel it is most consistent with JAZMIN       Pre-diabetes 07/07/2015     Priority: Medium     Nonalcoholic steatohepatitis (WILKINSON) 05/26/2015     Priority: Medium     Fatty liver seen on US.  Had mild transaminitis       Meniscus tear 09/25/2013     Priority: Medium     Hyperlipidemia LDL goal <160 12/21/2010     Priority: Medium      History reviewed. No pertinent past medical history.  Past Surgical History:   Procedure Laterality Date     ARTHROSCOPY ANKLE, OPEN REPAIR LIGAMENT, COMBINED Right 1/5/2017    Procedure: COMBINED ARTHROSCOPY ANKLE, OPEN REPAIR LIGAMENT;  Surgeon: Avinash Dick DPM;  Location: [facility]     ARTHROSCOPY KNEE IRRIGATION AND DEBRIDEMENT  9/27/2013    Procedure: ARTHROSCOPY KNEE IRRIGATION AND DEBRIDEMENT;;  Surgeon: Ley, Jeffrey Duane, MD;  Location: [facility] OR     ARTHROSCOPY KNEE WITH MEDIAL MENISCECTOMY  9/27/2013    Procedure: ARTHROSCOPY KNEE WITH MEDIAL MENISCECTOMY;  Right Knee Arthroscopy Partial Menisectomy and  "Debridement,Left Knee Arthroscopy Debridement and Loose Body Removal--needs assist.;  Surgeon: Ley, Jeffrey Duane, MD;  Location: [facility] OR     COLONOSCOPY N/A 8/17/2016    Procedure: COLONOSCOPY;  Surgeon: Rudolph Nazario MD;  Location: [facility] GI     HEMORRHOIDECTOMY  2000    Internal and external hemorrhoidectomy     SURGICAL HISTORY OF -   6/2001    Left Achilles tendon repair     Current Outpatient Prescriptions   Medication Sig Dispense Refill     colchicine (COLCRYS) 0.6 MG tablet 2 tabs at the onset of flare, then 1 tab every 2 hrs until pain is better or diarrhea occurs, up to 10 doses. Wait 3 days until taking again 30 tablet 0     order for DME CAM walker - short 1 Device 0     order for DME Equipment being ordered: CPAP Daniel Schultz received a [employer] Auto CPAP Auto. Pressures were set at Auto 8 - 15 cm H2O.       OTC products: None, except as noted above    Allergies   Allergen Reactions     Nkda [No Known Drug Allergies]       Latex Allergy: NO    Social History   Substance Use Topics     Smoking status: Never Smoker     Smokeless tobacco: Never Used     Alcohol use 0.0 oz/week     0 Standard drinks or equivalent per week      Comment: every other week     History   Drug Use No       REVIEW OF SYSTEMS:                                                    C: NEGATIVE for fever, chills, change in weight  E/M: NEGATIVE for ear, mouth and throat problems  R: NEGATIVE for significant cough or SOB  CV: NEGATIVE for chest pain, palpitations or peripheral edema    He had a viral gastroenteritis last week and is now back to normal.     EXAM:                                                    /80  Pulse 77  Temp 97.8  F (36.6  C) (Oral)  Ht 5' 10\" (1.778 m)  Wt 223 lb (101.2 kg)  BMI 32 kg/m2  Exam:  GENERAL APPEARANCE: healthy, alert and no distress  EYES: EOMI,  PERRL  HENT: ear canals and TM's normal and nose and mouth without ulcers or lesions  NECK: no adenopathy, no " asymmetry, masses, or scars and thyroid normal to palpation  RESP: lungs clear to auscultation - no rales, rhonchi or wheezes  CV: regular rates and rhythm, normal S1 S2, no S3 or S4 and no murmur, click or rub -  ABDOMEN:  soft, nontender, no HSM or masses and bowel sounds normal  GU_male: testicles normal without atrophy or masses, no hernias and penis normal without urethral discharge  MS: well healed right ankle scars.   SKIN: no suspicious lesions or rashes  NEURO: Normal strength and tone, sensory exam grossly normal, mentation intact and speech normal  PSYCH: mentation appears normal and affect normal/bright  LYMPHATICS: No axillary, cervical, inguinal, or supraclavicular nodes      DIAGNOSTICS:                                                      EKG: appears normal, NSR, normal axis, normal intervals, no acute ST/T changes c/w ischemia, no LVH by voltage criteria, unchanged from previous tracings  Labs Drawn and in Process:   Unresulted Labs Ordered in the Past 30 Days of this Admission     No orders found from 2017 to 2018.          Recent Labs   Lab Test  17   0928  05/21/15   1004  05/16/15   1012  13   0900   HGB   --    --   15.5  15.3   PLT   --    --   216  213   NA   --   140  141   --    POTASSIUM   --   4.1  3.6   --    CR   --   1.12  1.04   --    A1C  5.8  6.1*   --    --         IMPRESSION:                                                    Reason for surgery/procedure: Daniel Schultz (: 1965) presents for pre-operative evaluation assessment as requested by Dr. Dick.  He requires evaluation and anesthesia risk assessment prior to undergoing surgery/procedure for treatment of a tendon abnormality in the right ankle.  Proposed procedure: Right Ankle Posterior Tibial Tendon Repair,Peroneus Brevis Repair.    The proposed surgical procedure is considered LOW risk.    REVISED CARDIAC RISK INDEX  The patient has the following serious cardiovascular risks for  perioperative complications such as (MI, PE, VFib and 3  AV Block):  No serious cardiac risks  INTERPRETATION: 0 risks: Class I (very low risk - 0.4% complication rate)    The patient has the following additional risks for perioperative complications:  No identified additional risks      ICD-10-CM    1. Preop general physical exam Z01.818        RECOMMENDATIONS:                                                        --Patient is to take all scheduled medications on the day before surgery EXCEPT for modifications listed below.  Your stomach should be empty for 8 hours before surgery.   Take no aspirin or advil or aleve for one week before surgery. Tylenol is OK.     APPROVAL GIVEN to proceed with proposed procedure, without further diagnostic evaluation       Signed Electronically by: Reuben Rodriguez MD    Copy of this evaluation report is provided to requesting physician.    [facility] Preop Guidelines

## 2018-01-11 NOTE — PATIENT INSTRUCTIONS
Before Your Surgery      Call your surgeon if there is any change in your health. This includes signs of a cold or flu (such as a sore throat, runny nose, cough, rash or fever).    Do not smoke, drink alcohol or take over the counter medicine (unless your surgeon or primary care doctor tells you to) for the 24 hours before and after surgery.    If you take prescribed drugs: Follow your doctor s orders about which medicines to take and which to stop until after surgery.    Eating and drinking prior to surgery: follow the instructions from your surgeon    Take a shower or bath the night before surgery. Use the soap your surgeon gave you to gently clean your skin. If you do not have soap from your surgeon, use your regular soap. Do not shave or scrub the surgery site.  Wear clean pajamas and have clean sheets on your bed.             Thank you for choosing [facility].  You may be receiving a survey in the mail from [employer] regarding your visit today.  Please take a few minutes to complete and return the survey to let us know how we are doing.      If you have questions or concerns, please contact us via [employer] or you can contact your care team at [phone].    Our Clinic hours are:  Monday 6:40 am  to 7:00 pm  Tuesday -Friday 6:40 am to 5:00 pm    The [place] outpatient lab hours are:  Monday - Friday 6:10 am to 4:45 pm  Saturdays 7:00 am to 11:00 am  Appointments are required, call [phone]    If you have clinical questions after hours or would like to schedule an appointment,  call the clinic at [phone].        DIAGNOSTICS:                                                      EKG: appears normal, NSR, normal axis, normal intervals, no acute ST/T changes c/w ischemia, no LVH by voltage criteria, unchanged from previous tracings  Labs Drawn and in Process:   Unresulted Labs Ordered in the Past 30 Days of this Admission     No orders found from 11/12/2017 to 1/12/2018.          Recent Labs   Lab  Test  17   0928  05/21/15   1004  05/16/15   1012  13   0900   HGB   --    --   15.5  15.3   PLT   --    --   216  213   NA   --   140  141   --    POTASSIUM   --   4.1  3.6   --    CR   --   1.12  1.04   --    A1C  5.8  6.1*   --    --         IMPRESSION:                                                    Reason for surgery/procedure: Daniel Schultz (: 1965) presents for pre-operative evaluation assessment as requested by Dr. Dick.  He requires evaluation and anesthesia risk assessment prior to undergoing surgery/procedure for treatment of a tendon abnormality in the right ankle.  Proposed procedure: Right Ankle Posterior Tibial Tendon Repair,Peroneus Brevis Repair.    The proposed surgical procedure is considered LOW risk.    REVISED CARDIAC RISK INDEX  The patient has the following serious cardiovascular risks for perioperative complications such as (MI, PE, VFib and 3  AV Block):  No serious cardiac risks  INTERPRETATION: 0 risks: Class I (very low risk - 0.4% complication rate)    The patient has the following additional risks for perioperative complications:  No identified additional risks      ICD-10-CM    1. Preop general physical exam Z01.818        RECOMMENDATIONS:                                                        --Patient is to take all scheduled medications on the day before surgery EXCEPT for modifications listed below.  Your stomach should be empty for 8 hours before surgery.   Take no aspirin or advil or aleve for one week before surgery. Tylenol is OK.     APPROVAL GIVEN to proceed with proposed procedure, without further diagnostic evaluation

## 2018-01-11 NOTE — LETTER
January 15, 2018      Daniel AARON Schultz  [address] [address]        Dear ,    We are writing to inform you of your test results.    Your test results fall within the expected range(s) .  Resulted Orders   Creatinine   Result Value Ref Range    Creatinine 0.98 0.66 - 1.25 mg/dL    GFR Estimate 80 >60 mL/min/1.7m2      Comment:      Non  GFR Calc    GFR Estimate If Black >90 >60 mL/min/1.7m2      Comment:       GFR Calc   CBC with platelets differential   Result Value Ref Range    WBC 7.6 4.0 - 11.0 10e9/L    RBC Count 4.99 4.4 - 5.9 10e12/L    Hemoglobin 15.5 13.3 - 17.7 g/dL    Hematocrit 44.9 40.0 - 53.0 %    MCV 90 78 - 100 fl    MCH 31.1 26.5 - 33.0 pg    MCHC 34.5 31.5 - 36.5 g/dL    RDW 12.4 10.0 - 15.0 %    Platelet Count 232 150 - 450 10e9/L    Diff Method Automated Method     % Neutrophils 53.8 %    % Lymphocytes 30.1 %    % Monocytes 9.8 %    % Eosinophils 5.6 %    % Basophils 0.7 %    Absolute Neutrophil 4.1 1.6 - 8.3 10e9/L    Absolute Lymphocytes 2.3 0.8 - 5.3 10e9/L    Absolute Monocytes 0.7 0.0 - 1.3 10e9/L    Absolute Eosinophils 0.4 0.0 - 0.7 10e9/L    Absolute Basophils 0.1 0.0 - 0.2 10e9/L       If you have any questions or concerns, please call the clinic at the number listed above.       Sincerely,        Reuben Rodriguez MD/cheng

## 2018-01-11 NOTE — MR AVS SNAPSHOT
After Visit Summary   1/11/2018    Daniel Schultz    MRN: [id number]           Patient Information     Date Of Birth          1965        Visit Information        Provider Department      1/11/2018 8:00 AM Reuben Rodriguez MD [facility]        Today's Diagnoses     Preop general physical exam    -  1      Care Instructions      Before Your Surgery      Call your surgeon if there is any change in your health. This includes signs of a cold or flu (such as a sore throat, runny nose, cough, rash or fever).    Do not smoke, drink alcohol or take over the counter medicine (unless your surgeon or primary care doctor tells you to) for the 24 hours before and after surgery.    If you take prescribed drugs: Follow your doctor s orders about which medicines to take and which to stop until after surgery.    Eating and drinking prior to surgery: follow the instructions from your surgeon    Take a shower or bath the night before surgery. Use the soap your surgeon gave you to gently clean your skin. If you do not have soap from your surgeon, use your regular soap. Do not shave or scrub the surgery site.  Wear clean pajamas and have clean sheets on your bed.             Thank you for choosing [facility].  You may be receiving a survey in the mail from [employer] regarding your visit today.  Please take a few minutes to complete and return the survey to let us know how we are doing.      If you have questions or concerns, please contact us via [employer] or you can contact your care team at [phone].    Our Clinic hours are:  Monday 6:40 am  to 7:00 pm  Tuesday -Friday 6:40 am to 5:00 pm    The [place] outpatient lab hours are:  Monday - Friday 6:10 am to 4:45 pm  Saturdays 7:00 am to 11:00 am  Appointments are required, call [phone]    If you have clinical questions after hours or would like to schedule an appointment,  call the clinic at [phone].        DIAGNOSTICS:                                                       EKG: appears normal, NSR, normal axis, normal intervals, no acute ST/T changes c/w ischemia, no LVH by voltage criteria, unchanged from previous tracings  Labs Drawn and in Process:   Unresulted Labs Ordered in the Past 30 Days of this Admission     No orders found from 2017 to 2018.          Recent Labs   Lab Test  17   0928  05/21/15   1004  05/16/15   1012  13   0900   HGB   --    --   15.5  15.3   PLT   --    --   216  213   NA   --   140  141   --    POTASSIUM   --   4.1  3.6   --    CR   --   1.12  1.04   --    A1C  5.8  6.1*   --    --         IMPRESSION:                                                    Reason for surgery/procedure: Daniel Schultz (: 1965) presents for pre-operative evaluation assessment as requested by Dr. Dick.  He requires evaluation and anesthesia risk assessment prior to undergoing surgery/procedure for treatment of a tendon abnormality in the right ankle.  Proposed procedure: Right Ankle Posterior Tibial Tendon Repair,Peroneus Brevis Repair.    The proposed surgical procedure is considered LOW risk.    REVISED CARDIAC RISK INDEX  The patient has the following serious cardiovascular risks for perioperative complications such as (MI, PE, VFib and 3  AV Block):  No serious cardiac risks  INTERPRETATION: 0 risks: Class I (very low risk - 0.4% complication rate)    The patient has the following additional risks for perioperative complications:  No identified additional risks      ICD-10-CM    1. Preop general physical exam Z01.818        RECOMMENDATIONS:                                                        --Patient is to take all scheduled medications on the day before surgery EXCEPT for modifications listed below.  Your stomach should be empty for 8 hours before surgery.   Take no aspirin or advil or aleve for one week before surgery. Tylenol is OK.     APPROVAL GIVEN to proceed with proposed procedure,  "without further diagnostic evaluation               Follow-ups after your visit        Your next 10 appointments already scheduled     Jan 25, 2018   Procedure with Avinash Dick DPM   [facility] (--)    [address] [address] [phone]           The medical center is located at [address] (between [address] and [address] in [place], [place] north of [place]).              Who to contact     If you have questions or need follow up information about today's clinic visit or your schedule please contact [facility] directly at [phone].  Normal or non-critical lab and imaging results will be communicated to you by MyChart, letter or phone within 4 business days after the clinic has received the results. If you do not hear from us within 7 days, please contact the clinic through [employer] or phone. If you have a critical or abnormal lab result, we will notify you by phone as soon as possible.  Submit refill requests through [employer] or call your pharmacy and they will forward the refill request to us. Please allow 3 business days for your refill to be completed.          Additional Information About Your Visit        MyChart Information     [employer] gives you secure access to your electronic health record. If you see a primary care provider, you can also send messages to your care team and make appointments. If you have questions, please call your primary care clinic.  If you do not have a primary care provider, please call [phone] and they will assist you.        Care EveryWhere ID     This is your Care EveryWhere ID. This could be used by other organizations to access your [place] medical records  [id number]        Your Vitals Were     Pulse Temperature Height BMI (Body Mass Index)          77 97.8  F (36.6  C) (Oral) 5' 10\" (1.778 m) 32 kg/m2         Blood Pressure from Last 3 Encounters:   01/11/18 121/80   06/08/17 123/86   01/05/17 " 139/90    Weight from Last 3 Encounters:   01/11/18 223 lb (101.2 kg)   06/08/17 218 lb (98.9 kg)   01/03/17 242 lb (109.8 kg)              We Performed the Following     CBC with platelets differential     Creatinine     EKG 12-lead complete w/read - Clinics        Primary Care Provider Office Phone # Fax #    No Fierro, [phone] [address] [address]        Equal Access to Services     JUVE OLSON : Hadii aad ku hadasho Soomaali, waaxda luqadaha, qaybta kaalmada adeegyada, waxay idiin hayaan adeeg khdavis pickering . So [facility] [phone].    ATENCIÓN: Si habla español, tiene a liu disposición servicios gratuitos de asistencia lingüística. Llame al [phone].    We comply with applicable federal civil rights laws and [place] laws. We do not discriminate on the basis of race, color, national origin, age, disability, sex, sexual orientation, or gender identity.            Thank you!     Thank you for choosing [facility]  for your care. Our goal is always to provide you with excellent care. Hearing back from our patients is one way we can continue to improve our services. Please take a few minutes to complete the written survey that you may receive in the mail after your visit with us. Thank you!             Your Updated Medication List - Protect others around you: Learn how to safely use, store and throw away your medicines at www.disposemymeds.org.          This list is accurate as of: 1/11/18  9:21 AM.  Always use your most recent med list.                   Brand Name Dispense Instructions for use Diagnosis    colchicine 0.6 MG tablet    COLCRYS    30 tablet    2 tabs at the onset of flare, then 1 tab every 2 hrs until pain is better or diarrhea occurs, up to 10 doses. Wait 3 days until taking again    Pain of toe of left foot       order for DME      Equipment being ordered: COSTA Schultz received a [employer]  CPAP Auto. Pressures were set at Auto 8 - 15 cm H2O.        order for DME     1 Device    CAM walker - short    Posterior tibial tendonitis of right leg, Peroneal tendinitis of right lower extremity

## 2018-01-11 NOTE — NURSING NOTE
"Chief Complaint   Patient presents with     Pre-Op Exam     Pre-op physical.       Initial /80  Pulse 77  Temp 97.8  F (36.6  C) (Oral)  Ht 5' 10\" (1.778 m)  Wt 223 lb (101.2 kg)  BMI 32 kg/m2 Estimated body mass index is 32 kg/(m^2) as calculated from the following:    Height as of this encounter: 5' 10\" (1.778 m).    Weight as of this encounter: 223 lb (101.2 kg).  Medication Reconciliation: complete  "

## 2018-01-22 ENCOUNTER — ANESTHESIA EVENT (OUTPATIENT)
Dept: SURGERY | Facility: CLINIC | Age: 53
End: 2018-01-22
Payer: COMMERCIAL

## 2018-01-25 ENCOUNTER — ANESTHESIA (OUTPATIENT)
Dept: SURGERY | Facility: CLINIC | Age: 53
End: 2018-01-25
Payer: COMMERCIAL

## 2018-01-25 ENCOUNTER — HOSPITAL ENCOUNTER (OUTPATIENT)
Facility: CLINIC | Age: 53
Discharge: HOME | End: 2018-01-25
Attending: PODIATRIST | Admitting: PODIATRIST
Payer: COMMERCIAL

## 2018-01-25 VITALS
TEMPERATURE: 97.5 F | HEIGHT: 70 IN | OXYGEN SATURATION: 97 % | SYSTOLIC BLOOD PRESSURE: 108 MMHG | DIASTOLIC BLOOD PRESSURE: 74 MMHG | WEIGHT: 223 LBS | BODY MASS INDEX: 31.92 KG/M2 | RESPIRATION RATE: 18 BRPM

## 2018-01-25 DIAGNOSIS — G89.18 POST-OP PAIN: Primary | ICD-10-CM

## 2018-01-25 DIAGNOSIS — Z91.89 AT MODERATE RISK FOR DEEP VENOUS THROMBOSIS: ICD-10-CM

## 2018-01-25 PROCEDURE — 25000128 H RX IP 250 OP 636: Performed by: NURSE ANESTHETIST, CERTIFIED REGISTERED

## 2018-01-25 PROCEDURE — 71000027 ZZH RECOVERY PHASE 2 EACH 15 MINS: Performed by: PODIATRIST

## 2018-01-25 PROCEDURE — 27210794 ZZH OR GENERAL SUPPLY STERILE: Performed by: PODIATRIST

## 2018-01-25 PROCEDURE — C1713 ANCHOR/SCREW BN/BN,TIS/BN: HCPCS | Performed by: PODIATRIST

## 2018-01-25 PROCEDURE — 36000058 ZZH SURGERY LEVEL 3 EA 15 ADDTL MIN: Performed by: PODIATRIST

## 2018-01-25 PROCEDURE — 37000008 ZZH ANESTHESIA TECHNICAL FEE, 1ST 30 MIN: Performed by: PODIATRIST

## 2018-01-25 PROCEDURE — 25000125 ZZHC RX 250: Performed by: NURSE ANESTHETIST, CERTIFIED REGISTERED

## 2018-01-25 PROCEDURE — 36000060 ZZH SURGERY LEVEL 3 W FLUORO 1ST 30 MIN: Performed by: PODIATRIST

## 2018-01-25 PROCEDURE — 25000128 H RX IP 250 OP 636: Performed by: PODIATRIST

## 2018-01-25 PROCEDURE — 25000125 ZZHC RX 250: Performed by: PODIATRIST

## 2018-01-25 PROCEDURE — 37000009 ZZH ANESTHESIA TECHNICAL FEE, EACH ADDTL 15 MIN: Performed by: PODIATRIST

## 2018-01-25 PROCEDURE — 40000305 ZZH STATISTIC PRE PROC ASSESS I: Performed by: PODIATRIST

## 2018-01-25 DEVICE — IMP ANCHOR ARTHREX BIO-SUTURE TAK 3X14MM W/FW AR-8934BCNF: Type: IMPLANTABLE DEVICE | Site: ANKLE | Status: FUNCTIONAL

## 2018-01-25 RX ORDER — ROPIVACAINE HYDROCHLORIDE 7.5 MG/ML
INJECTION, SOLUTION EPIDURAL; PERINEURAL PRN
Status: DISCONTINUED | OUTPATIENT
Start: 2018-01-25 | End: 2018-01-25

## 2018-01-25 RX ORDER — PROPOFOL 10 MG/ML
INJECTION, EMULSION INTRAVENOUS CONTINUOUS PRN
Status: DISCONTINUED | OUTPATIENT
Start: 2018-01-25 | End: 2018-01-25

## 2018-01-25 RX ORDER — FENTANYL CITRATE 50 UG/ML
INJECTION, SOLUTION INTRAMUSCULAR; INTRAVENOUS PRN
Status: DISCONTINUED | OUTPATIENT
Start: 2018-01-25 | End: 2018-01-25

## 2018-01-25 RX ORDER — PROPOFOL 10 MG/ML
INJECTION, EMULSION INTRAVENOUS PRN
Status: DISCONTINUED | OUTPATIENT
Start: 2018-01-25 | End: 2018-01-25

## 2018-01-25 RX ORDER — ONDANSETRON 4 MG/1
4 TABLET, ORALLY DISINTEGRATING ORAL EVERY 30 MIN PRN
Status: DISCONTINUED | OUTPATIENT
Start: 2018-01-25 | End: 2018-01-25 | Stop reason: HOSPADM

## 2018-01-25 RX ORDER — ONDANSETRON 2 MG/ML
4 INJECTION INTRAMUSCULAR; INTRAVENOUS EVERY 30 MIN PRN
Status: DISCONTINUED | OUTPATIENT
Start: 2018-01-25 | End: 2018-01-25 | Stop reason: HOSPADM

## 2018-01-25 RX ORDER — NALOXONE HYDROCHLORIDE 0.4 MG/ML
.1-.4 INJECTION, SOLUTION INTRAMUSCULAR; INTRAVENOUS; SUBCUTANEOUS
Status: DISCONTINUED | OUTPATIENT
Start: 2018-01-25 | End: 2018-01-25 | Stop reason: HOSPADM

## 2018-01-25 RX ORDER — KETOROLAC TROMETHAMINE 30 MG/ML
30 INJECTION, SOLUTION INTRAMUSCULAR; INTRAVENOUS EVERY 6 HOURS PRN
Status: DISCONTINUED | OUTPATIENT
Start: 2018-01-25 | End: 2018-01-25 | Stop reason: HOSPADM

## 2018-01-25 RX ORDER — LIDOCAINE HYDROCHLORIDE 10 MG/ML
INJECTION, SOLUTION INFILTRATION; PERINEURAL PRN
Status: DISCONTINUED | OUTPATIENT
Start: 2018-01-25 | End: 2018-01-25

## 2018-01-25 RX ORDER — FENTANYL CITRATE 50 UG/ML
25-50 INJECTION, SOLUTION INTRAMUSCULAR; INTRAVENOUS
Status: DISCONTINUED | OUTPATIENT
Start: 2018-01-25 | End: 2018-01-25 | Stop reason: HOSPADM

## 2018-01-25 RX ORDER — CEFAZOLIN SODIUM 2 G/100ML
2 INJECTION, SOLUTION INTRAVENOUS
Status: COMPLETED | OUTPATIENT
Start: 2018-01-25 | End: 2018-01-25

## 2018-01-25 RX ORDER — LIDOCAINE 40 MG/G
CREAM TOPICAL
Status: DISCONTINUED | OUTPATIENT
Start: 2018-01-25 | End: 2018-01-25 | Stop reason: HOSPADM

## 2018-01-25 RX ORDER — MEPERIDINE HYDROCHLORIDE 25 MG/ML
12.5 INJECTION INTRAMUSCULAR; INTRAVENOUS; SUBCUTANEOUS
Status: DISCONTINUED | OUTPATIENT
Start: 2018-01-25 | End: 2018-01-25 | Stop reason: HOSPADM

## 2018-01-25 RX ORDER — HYDROXYZINE HYDROCHLORIDE 25 MG/1
25 TABLET, FILM COATED ORAL EVERY 4 HOURS PRN
Qty: 36 TABLET | Refills: 0 | Status: SHIPPED | OUTPATIENT
Start: 2018-01-25 | End: 2018-04-23

## 2018-01-25 RX ORDER — ALBUTEROL SULFATE 0.83 MG/ML
2.5 SOLUTION RESPIRATORY (INHALATION) EVERY 4 HOURS PRN
Status: DISCONTINUED | OUTPATIENT
Start: 2018-01-25 | End: 2018-01-25 | Stop reason: HOSPADM

## 2018-01-25 RX ORDER — LIDOCAINE HCL/EPINEPHRINE/PF 2%-1:200K
VIAL (ML) INJECTION PRN
Status: DISCONTINUED | OUTPATIENT
Start: 2018-01-25 | End: 2018-01-25

## 2018-01-25 RX ORDER — HYDROMORPHONE HYDROCHLORIDE 1 MG/ML
.3-.5 INJECTION, SOLUTION INTRAMUSCULAR; INTRAVENOUS; SUBCUTANEOUS EVERY 10 MIN PRN
Status: DISCONTINUED | OUTPATIENT
Start: 2018-01-25 | End: 2018-01-25 | Stop reason: HOSPADM

## 2018-01-25 RX ORDER — OXYCODONE AND ACETAMINOPHEN 5; 325 MG/1; MG/1
1 TABLET ORAL
Status: DISCONTINUED | OUTPATIENT
Start: 2018-01-25 | End: 2018-01-25 | Stop reason: HOSPADM

## 2018-01-25 RX ORDER — SODIUM CHLORIDE, SODIUM LACTATE, POTASSIUM CHLORIDE, CALCIUM CHLORIDE 600; 310; 30; 20 MG/100ML; MG/100ML; MG/100ML; MG/100ML
INJECTION, SOLUTION INTRAVENOUS CONTINUOUS
Status: DISCONTINUED | OUTPATIENT
Start: 2018-01-25 | End: 2018-01-25 | Stop reason: HOSPADM

## 2018-01-25 RX ORDER — OXYCODONE AND ACETAMINOPHEN 5; 325 MG/1; MG/1
1-2 TABLET ORAL EVERY 4 HOURS PRN
Qty: 34 TABLET | Refills: 0 | Status: SHIPPED | OUTPATIENT
Start: 2018-01-25 | End: 2018-04-23

## 2018-01-25 RX ORDER — LIDOCAINE HYDROCHLORIDE 10 MG/ML
INJECTION, SOLUTION EPIDURAL; INFILTRATION; INTRACAUDAL; PERINEURAL PRN
Status: DISCONTINUED | OUTPATIENT
Start: 2018-01-25 | End: 2018-01-25

## 2018-01-25 RX ORDER — BUPIVACAINE HYDROCHLORIDE 5 MG/ML
INJECTION, SOLUTION PERINEURAL PRN
Status: DISCONTINUED | OUTPATIENT
Start: 2018-01-25 | End: 2018-01-25 | Stop reason: HOSPADM

## 2018-01-25 RX ADMIN — ROPIVACAINE HYDROCHLORIDE 15 ML: 7.5 INJECTION, SOLUTION EPIDURAL; PERINEURAL at 12:29

## 2018-01-25 RX ADMIN — CEFAZOLIN SODIUM 2 G: 2 INJECTION, SOLUTION INTRAVENOUS at 14:15

## 2018-01-25 RX ADMIN — ROPIVACAINE HYDROCHLORIDE 25 ML: 7.5 INJECTION, SOLUTION EPIDURAL; PERINEURAL at 12:25

## 2018-01-25 RX ADMIN — MIDAZOLAM 2 MG: 1 INJECTION INTRAMUSCULAR; INTRAVENOUS at 12:05

## 2018-01-25 RX ADMIN — LIDOCAINE HYDROCHLORIDE 50 MG: 10 INJECTION, SOLUTION INFILTRATION; PERINEURAL at 14:21

## 2018-01-25 RX ADMIN — KETOROLAC TROMETHAMINE 30 MG: 30 INJECTION, SOLUTION INTRAMUSCULAR at 16:13

## 2018-01-25 RX ADMIN — FENTANYL CITRATE 100 MCG: 50 INJECTION, SOLUTION INTRAMUSCULAR; INTRAVENOUS at 12:07

## 2018-01-25 RX ADMIN — LIDOCAINE HYDROCHLORIDE 3 ML: 10 INJECTION, SOLUTION EPIDURAL; INFILTRATION; INTRACAUDAL; PERINEURAL at 12:18

## 2018-01-25 RX ADMIN — PROPOFOL 175 MCG/KG/MIN: 10 INJECTION, EMULSION INTRAVENOUS at 14:21

## 2018-01-25 RX ADMIN — PROPOFOL 50 MG: 10 INJECTION, EMULSION INTRAVENOUS at 14:21

## 2018-01-25 RX ADMIN — LIDOCAINE HYDROCHLORIDE,EPINEPHRINE BITARTRATE 5 ML: 20; .005 INJECTION, SOLUTION EPIDURAL; INFILTRATION; INTRACAUDAL; PERINEURAL at 12:23

## 2018-01-25 RX ADMIN — SODIUM CHLORIDE, POTASSIUM CHLORIDE, SODIUM LACTATE AND CALCIUM CHLORIDE: 600; 310; 30; 20 INJECTION, SOLUTION INTRAVENOUS at 12:05

## 2018-01-25 RX ADMIN — LIDOCAINE HYDROCHLORIDE 2 ML: 10 INJECTION, SOLUTION EPIDURAL; INFILTRATION; INTRACAUDAL; PERINEURAL at 12:28

## 2018-01-25 RX ADMIN — Medication 0.5 MG: at 16:10

## 2018-01-25 NOTE — ANESTHESIA POSTPROCEDURE EVALUATION
Patient: Daniel Schultz    Procedure(s):  Right Ankle Posterior Tibial Tendon Repair,Peroneus Brevis Repair, Medial Ligament Repair - Wound Class: I-Clean    Diagnosis:right ankle pain  Diagnosis Additional Information: No value filed.    Anesthesia Type:  General, Periph. Nerve Block for postop pain    Note:  Anesthesia Post Evaluation    Patient location during evaluation: Bedside  Patient participation: Able to fully participate in evaluation  Level of consciousness: awake and alert  Pain management: adequate  Airway patency: patent  Cardiovascular status: acceptable  Respiratory status: acceptable  Hydration status: acceptable  PONV: none     Anesthetic complications: None          Last vitals:  Vitals:    01/25/18 1131 01/25/18 1215   BP: (!) 125/92 110/70   Resp: 16 16   Temp: 36.8  C (98.3  F)    SpO2: 98% 99%         Electronically Signed By: Zahra Melendrez CRNA, APRN CRNA  January 25, 2018  3:48 PM

## 2018-01-25 NOTE — DISCHARGE INSTRUCTIONS
Same Day Surgery Discharge Instructions  Special Precautions After Surgery - Adult    1. It is not unusual to feel lightheaded or faint, up to 24 hours after surgery or while taking pain medication.  If you have these symptoms; sit for a few minutes before standing and have someone assist you when getting up.  2. You should rest and relax for the next 24 hours and must have someone stay with you for at least 24 hours after your discharge.  3. DO NOT DRIVE any vehicle or operate mechanical equipment for 24 hours following the end of your surgery.  DO NOT DRIVE while taking narcotic pain medications that have been prescribed by your physician.  If you had a limb operated on, you must be able to use it fully to drive.  4. DO NOT drink alcoholic beverages for 24 hours following surgery or while taking prescription pain medication.  5. Drink clear liquids (apple juice, ginger ale, broth, 7-Up, etc.).  Progress to your regular diet as you feel able.  6. Any questions call your physician and do not make important decisions for 24 hours.    ACTIVITY  ? Rest today.  No activity or diet restrictions.  ? Resume activity as tolerated.  ? Restrictions: Per Dr Dick and Anebrendansicolleen  ? See printed discharge sheet.     INCISIONAL CARE  ? Do not remove dressing until seen by physician.  ? Keep extremity elevated above the level of the heart if possible. .  ? Apply ice 1/2 hour on and 1/2 hour off while awake.  ? Be alert for signs of infection:  redness, swelling, heat, drainage of pus, and/or elevated temperature.  Contact your doctor if these occur.        Call for an appointment to return to the clinic in 10-14 days.    Medications:  ? Acetaminophen & Oxycodone (Percocet):  Next dose: as needed but before bed tonight and then at prescribed times .  ? Hydroxyzine (Vistaril):  Next dose:  as needed but before bed tonight and then at prescribed times .  ? Ibuprofen (Motrin, Advil):  Next dose:  As needed but not  before 1030 tonight .  ? Aspirin :  Next dose:  Tomorrow am   ? Stool softeners as needed to prevent constipation .  ? Follow the instructions on the bottle.     Additional discharge instructions: follow instructions per anesthesia block recommendations   __________________________________________________________________________________________________________________________________  IMPORTANT NUMBERS:    [facility] Main Number:  [phone], [phone]  Pharmacy:  [phone]  Same Day Surgery:  [phone], Monday - Friday until 8:30 p.m  Emergency Room:  [phone]      [facility]:  [phone]

## 2018-01-25 NOTE — OR NURSING
1220-right ankle block completed in room 2. Pt tolerated well with no complaints of discomfort. Vital signs stable througout procedure with continuous ekg, respiration, pulse monitoring..

## 2018-01-25 NOTE — BRIEF OP NOTE
[facility] OR   Brief Operative Note    Pre-operative diagnosis: right ankle pain   Post-operative diagnosis * No post-op diagnosis entered *   Procedure: Procedure(s):  Right Ankle Posterior Tibial Tendon Repair,Peroneus Brevis Repair, Medial Ligament Repair - Wound Class: I-Clean   Surgeon: Avinash Dick DPM   Anesthesia: Combined General with Popliteal Block    Estimated blood loss: Less than 10 ml   Blood transfusion: No transfusion was given during surgery   Drains: None   Specimens: None   Findings: + medial posterior tibial tenodinopathy with enlargement and splitting; deltoid insufficiency and partial tear/attenuation of the anterior, inferior aspect.  + peroneal tendinopathy distinct of the peroneal longus and brevis with longitudinal splitting of both.   Complications: None   Condition: Stable   Comments: See dictated operative report for full details.           Avinash Dick DPM, FACFAS  Foot & Ankle Surgeon/Specialist  [facility]

## 2018-01-25 NOTE — IP AVS SNAPSHOT
[facility] PreOP/Phase II    [address] [address]    Phone:  [phone]    Fax:  [phone]                                       After Visit Summary   1/25/2018    Daniel Schultz    MRN: [id number]           After Visit Summary Signature Page     I have received my discharge instructions, and my questions have been answered. I have discussed any challenges I see with this plan with the nurse or doctor.    ..........................................................................................................................................  Patient/Patient Representative Signature      ..........................................................................................................................................  Patient Representative Print Name and Relationship to Patient    ..................................................               ................................................  Date                                            Time    ..........................................................................................................................................  Reviewed by Signature/Title    ...................................................              ..............................................  Date                                                            Time

## 2018-01-25 NOTE — ANESTHESIA PROCEDURE NOTES
Peripheral nerve/Neuraxial procedure note : Saphenous, Sciatic and Popliteal  Pre-Procedure  Performed by  LOTTIE CASTELLON   Location: pre-op      Pre-Anesthestic Checklist: patient identified, IV checked, site marked, risks and benefits discussed, informed consent, monitors and equipment checked, pre-op evaluation, at physician/surgeon's request and post-op pain management    Timeout  Correct Patient: Yes   Correct Procedure: Yes   Correct Site: Yes   Correct Laterality: Yes   Correct Position: Yes   Site Marked: Yes   .   Procedure Documentation    .    Procedure:  right  Saphenous, Sciatic and Popliteal.  Local skin infiltrated with mL of 1% lidocaine.     Ultrasound used to identify targeted nerve, plexus, or vascular marker and placed a needle adjacent to it., Ultrasound was used to visualize the spread of the anesthetic in close proximity to the above stated nerve. A permanent image is entered into the patient's record.  Patient Prep;mask, sterile gloves, chlorhexidine gluconate and isopropyl alcohol, patient draped.  Nerve Stim: Initial Level 0.9 mA.  Lowest motor response 0.4 mA..  Needle: insulated, short bevel Needle Gauge: 21.    Needle Length (Inches) 4  Insertion Method: Single Shot.       Assessment/Narrative  Paresthesias: No.  Injection made incrementally with aspirations every 5 mL..  The placement was negative for: blood aspirated and painful injection.  Bolus given via needle. No blood aspirated via catheter.   Secured via.   Complications: none. Test dose of mL lidocaine 2% w/ 1:200,000 epinephrine at. Test dose negative for signs of intravascular, subdural or intrathecal injection.

## 2018-01-25 NOTE — IP AVS SNAPSHOT
MRN:[id number]                      After Visit Summary   1/25/2018    Daniel Schultz    MRN: [id number]           Thank you!     Thank you for choosing [facility] for your care. Our goal is always to provide you with excellent care. Hearing back from our patients is one way we can continue to improve our services. Please take a few minutes to complete the written survey that you may receive in the mail after you visit with us. Thank you!        Patient Information     Date Of Birth          1965        About your hospital stay     You were admitted on:  January 25, 2018 You last received care in the:  [facility] PreOP/Phase II    You were discharged on:  January 25, 2018       Who to Call     For medical emergencies, please call 911.  For non-urgent questions about your medical care, please call your primary care provider or clinic, [phone]  For questions related to your surgery, please call your surgery clinic        Attending Provider     Provider Avinash Rivera DPM Podiatry       Primary Care Provider Office Phone # Fax #    No Mile Fierro [phone]      After Care Instructions     Discharge Instructions       Review discharge instructions as directed by Provider.            Discharge Instructions       Patient to be seen in next 10-14 days.    Please call [facility] at [phone] to make/confirm appointment.            Elevate affected extremity           Ice to affected area       Ice pack to affected area PRN (as needed).            No dressing change       until follow up clinic appointment.            No driving or operating machinery       until the day after procedure            No weight bearing                 Further instructions from your care team                           Same Day Surgery Discharge Instructions  Special Precautions After Surgery - Adult    1. It is not unusual to feel lightheaded or faint, up  to 24 hours after surgery or while taking pain medication.  If you have these symptoms; sit for a few minutes before standing and have someone assist you when getting up.  2. You should rest and relax for the next 24 hours and must have someone stay with you for at least 24 hours after your discharge.  3. DO NOT DRIVE any vehicle or operate mechanical equipment for 24 hours following the end of your surgery.  DO NOT DRIVE while taking narcotic pain medications that have been prescribed by your physician.  If you had a limb operated on, you must be able to use it fully to drive.  4. DO NOT drink alcoholic beverages for 24 hours following surgery or while taking prescription pain medication.  5. Drink clear liquids (apple juice, ginger ale, broth, 7-Up, etc.).  Progress to your regular diet as you feel able.  6. Any questions call your physician and do not make important decisions for 24 hours.    ACTIVITY  ? Rest today.  No activity or diet restrictions.  ? Resume activity as tolerated.  ? Restrictions: Per Dr Dick and Anethesia  ? See printed discharge sheet.     INCISIONAL CARE  ? Do not remove dressing until seen by physician.  ? Keep extremity elevated above the level of the heart if possible. .  ? Apply ice 1/2 hour on and 1/2 hour off while awake.  ? Be alert for signs of infection:  redness, swelling, heat, drainage of pus, and/or elevated temperature.  Contact your doctor if these occur.        Call for an appointment to return to the clinic in 10-14 days.    Medications:  ? Acetaminophen & Oxycodone (Percocet):  Next dose: as needed but before bed tonight and then at prescribed times .  ? Hydroxyzine (Vistaril):  Next dose:  as needed but before bed tonight and then at prescribed times .  ? Ibuprofen (Motrin, Advil):  Next dose:  As needed but not before 1030 tonight .  ? Aspirin :  Next dose:  Tomorrow am   ? Stool softeners as needed to prevent constipation .  ? Follow the instructions on the bottle.    "  Additional discharge instructions: follow instructions per anesthesia block recommendations   __________________________________________________________________________________________________________________________________  IMPORTANT NUMBERS:    [facility] Main Number:  [phone], [phone]  Pharmacy:  [phone]  Same Day Surgery:  [phone], Monday - Friday until 8:30 p.m  Emergency Room:  [phone] [place] [address] Orthopedics:  [phone]           Pending Results     Date and Time Order Name Status Description    1/25/2018 0543 XR Surgery OBED Fluoro L/T 5 Min w Stills In process             Admission Information     Date & Time Provider Department Dept. Phone    1/25/2018 Avinash Dick DPM [facility] PreOP/Phase -[phone]      Your Vitals Were     Blood Pressure Temperature Respirations Height Weight Pulse Oximetry    119/66 97.5  F (36.4  C) (Oral) 16 1.778 m (5' 10\") 101.2 kg (223 lb) 98%    BMI (Body Mass Index)                   32 kg/m2           [facility] Information     [facility] gives you secure access to your electronic health record. If you see a primary care provider, you can also send messages to your care team and make appointments. If you have questions, please call your primary care clinic.  If you do not have a primary care provider, please call [phone] and they will assist you.        Care EveryWhere ID     This is your Care EveryWhere ID. This could be used by other organizations to access your [facility] medical records  [id number]        Equal Access to Services     JUVE OLSON AH: sayda Edward, qaysabel camargo ah. [facility] [phone].    ATENCIÓN: Si habla español, tiene a liu disposición servicios gratuitos de asistencia lingüística. Llame al [phone].    We comply with applicable federal civil rights laws and [place] laws. We do not discriminate on the basis of race, " color, national origin, age, disability, sex, sexual orientation, or gender identity.               Review of your medicines      START taking        Dose / Directions    aspirin  MG EC tablet   Used for:  At moderate risk for deep venous thrombosis        Dose:  325 mg   Start taking on:  1/26/2018   Take 1 tablet (325 mg) by mouth daily   Quantity:  24 tablet   Refills:  0       hydrOXYzine 25 MG tablet   Commonly known as:  ATARAX   Used for:  Post-op pain        Dose:  25 mg   Take 1 tablet (25 mg) by mouth every 4 hours as needed for itching (and nausea)   Quantity:  36 tablet   Refills:  0       oxyCODONE-acetaminophen 5-325 MG per tablet   Commonly known as:  PERCOCET   Used for:  Post-op pain        Dose:  1-2 tablet   Take 1-2 tablets by mouth every 4 hours as needed for pain (moderate to severe)   Quantity:  34 tablet   Refills:  0         CONTINUE these medicines which have NOT CHANGED        Dose / Directions    colchicine 0.6 MG tablet   Commonly known as:  COLCRYS   Used for:  Pain of toe of left foot        2 tabs at the onset of flare, then 1 tab every 2 hrs until pain is better or diarrhea occurs, up to 10 doses. Wait 3 days until taking again   Quantity:  30 tablet   Refills:  0       order for DME        Equipment being ordered: CPAP Daniel Schultz received a [employer] DreamStation Auto CPAP Auto. Pressures were set at Auto 8 - 15 cm H2O.   Refills:  0       order for DME   Used for:  Posterior tibial tendonitis of right leg, Peroneal tendinitis of right lower extremity        CAM walker - short   Quantity:  1 Device   Refills:  0            Where to get your medicines      These medications were sent to [facility] [place], [place] - [address]  [address] [address]     Phone:  [phone]     aspirin  MG EC tablet    hydrOXYzine 25 MG tablet         Some of these will need a paper prescription and others can be bought over the counter.  Ask your nurse if you have questions.     Bring a paper prescription for each of these medications     oxyCODONE-acetaminophen 5-325 MG per tablet                Protect others around you: Learn how to safely use, store and throw away your medicines at www.disposemymeds.org.        Information about OPIOIDS     PRESCRIPTION OPIOIDS: WHAT YOU NEED TO KNOW    Prescription opioids can be used to help relieve moderate to severe pain and are often prescribed following a surgery or injury, or for certain health conditions. These medications can be an important part of treatment but also come with serious risks. It is important to work with your health care provider to make sure you are getting the safest, most effective care.    WHAT ARE THE RISKS AND SIDE EFFECTS OF OPIOID USE?  Prescription opioids carry serious risks of addiction and overdose, especially with prolonged use. An opioid overdose, often marked by slowed breathing can cause sudden death. The use of prescription opioids can have a number of side effects as well, even when taken as directed:      Tolerance - meaning you might need to take more of a medication for the same pain relief    Physical dependence - meaning you have symptoms of withdrawal when a medication is stopped    Increased sensitivity to pain    Constipation    Nausea, vomiting, and dry mouth    Sleepiness and dizziness    Confusion    Depression    Low levels of testosterone that can result in lower sex drive, energy, and strength    Itching and sweating    RISKS ARE GREATER WITH:    History of drug misuse, substance use disorder, or overdose    Mental health conditions (such as depression or anxiety)    Sleep apnea    Older age (65 years or older)    Pregnancy    Avoid alcohol while taking prescription opioids.   Also, unless specifically advised by your health care provider, medications to avoid include:    Benzodiazepines (such as Xanax or Valium)    Muscle relaxants (such as Soma or  Flexeril)    Hypnotics (such as Ambien or Lunesta)    Other prescription opioids    KNOW YOUR OPTIONS:  Talk to your health care provider about ways to manage your pain that do not involve prescription opioids. Some of these options may actually work better and have fewer risks and side effects:    Pain relievers such as acetaminophen, ibuprofen, and naproxen    Some medications that are also used for depression or seizures    Physical therapy and exercise    Cognitive behavioral therapy, a psychological, goal-directed approach, in which patients learn how to modify physical, behavioral, and emotional triggers of pain and stress    IF YOU ARE PRESCRIBED OPIOIDS FOR PAIN:    Never take opioids in greater amounts or more often than prescribed    Follow up with your primary health care provider and work together to create a plan on how to manage your pain.    Talk about ways to help manage your pain that do not involve prescription opioids    Talk about all concerns and side effects    Help prevent misuse and abuse    Never sell or share prescription opioids    Never use another person's prescription opioids    Store prescription opioids in a secure place and out of reach of others (this may include visitors, children, friends, and family)    Visit www.cdc.gov/drugoverdose to learn about risks of opioid abuse and overdose    If you believe you may be struggling with addiction, tell your health care provider and ask for guidance or call [facility]'s National Helpline at 8-735-149-HELP    LEARN MORE / www.cdc.gov/drugoverdose/prescribing/guideline.html    Safely dispose of unused prescription opioids: Find your local drug take-back programs and more information about the importance of safe disposal at www.doseofreality.[place].gov             Medication List: This is a list of all your medications and when to take them. Check marks below indicate your daily home schedule. Keep this list as a reference.      Medications            Morning Afternoon Evening Bedtime As Needed    aspirin  MG EC tablet   Take 1 tablet (325 mg) by mouth daily   Start taking on:  1/26/2018                                colchicine 0.6 MG tablet   Commonly known as:  COLCRYS   2 tabs at the onset of flare, then 1 tab every 2 hrs until pain is better or diarrhea occurs, up to 10 doses. Wait 3 days until taking again                                hydrOXYzine 25 MG tablet   Commonly known as:  ATARAX   Take 1 tablet (25 mg) by mouth every 4 hours as needed for itching (and nausea)                                order for DME   Equipment being ordered: CPAP Daniel Schultz received a [employer] DreamStation Auto CPAP Auto. Pressures were set at Auto 8 - 15 cm H2O.                                order for DME   CAM walker - short                                oxyCODONE-acetaminophen 5-325 MG per tablet   Commonly known as:  PERCOCET   Take 1-2 tablets by mouth every 4 hours as needed for pain (moderate to severe)

## 2018-01-25 NOTE — ANESTHESIA CARE TRANSFER NOTE
Patient: Daniel Schultz    Procedure(s):  Right Ankle Posterior Tibial Tendon Repair,Peroneus Brevis Repair, Medial Ligament Repair - Wound Class: I-Clean    Diagnosis: right ankle pain  Diagnosis Additional Information: No value filed.    Anesthesia Type:   General, Periph. Nerve Block for postop pain     Note:  Airway :Nasal Cannula  Patient transferred to:Phase II  Handoff Report: Identifed the Patient, Identified the Reponsible Provider, Reviewed the pertinent medical history, Discussed the surgical course, Reviewed Intra-OP anesthesia mangement and issues during anesthesia, Set expectations for post-procedure period and Allowed opportunity for questions and acknowledgement of understanding      Vitals: (Last set prior to Anesthesia Care Transfer)    CRNA VITALS  1/25/2018 1510 - 1/25/2018 1548      1/25/2018             EKG: NSR                Electronically Signed By: Zahra Melendrez CRNA, APRN CRNA  January 25, 2018  3:48 PM

## 2018-01-25 NOTE — H&P (VIEW-ONLY)
[facility]  [address] [address]  [phone]  Dept: [phone]    PRE-OP EVALUATION:  Today's date: 2018    Daniel Schultz (: 1965) presents for pre-operative evaluation assessment as requested by Dr. Dick.  He requires evaluation and anesthesia risk assessment prior to undergoing surgery/procedure for treatment of a tendon abnormality in the right ankle.  Proposed procedure: Right Ankle Posterior Tibial Tendon Repair,Peroneus Brevis Repair.    Date of Surgery/ Procedure: 18  Time of Surgery/ Procedure: 1:40 pm per [facility]/Surgical Facility: [facility]  Fax number for surgical facility: No fax needed, able to view in [facility].  Primary Physician: No Fierro.  Dr. Reuben Rodriguez did the pre-op physical.  Type of Anesthesia Anticipated: Combined general with block.    Patient has a Health Care Directive or Living Will:  NO    1. NO - Do you have a history of heart attack, stroke, stent, bypass or surgery on an artery in the head, neck, heart or legs?  2. NO - Do you ever have any pain or discomfort in your chest?  3. NO - Do you have a history of  Heart Failure?  4. NO - Are you troubled by shortness of breath when: walking on the level, up a slight hill or at night?  5. NO - Do you currently have a cold, bronchitis or other respiratory infection?  6. NO - Do you have a cough, shortness of breath or wheezing?  7. NO - Do you sometimes get pains in the calves of your legs when you walk?  8. NO - Do you or anyone in your family have previous history of blood clots?  9. NO - Do you or does anyone in your family have a serious bleeding problem such as prolonged bleeding following surgeries or cuts?  10. NO - Have you ever had problems with anemia or been told to take iron pills?  11. NO - Have you had any abnormal blood loss such as black, tarry or bloody stools, or abnormal vaginal bleeding?  12. NO - Have you ever had a blood transfusion?  13. NO  - Have you or any of your relatives ever had problems with anesthesia?  14. NO - Do you have sleep apnea, excessive snoring or daytime drowsiness?  15. NO - Do you have any prosthetic heart valves?  16. NO - Do you have prosthetic joints?      HPI:                                                      Brief HPI related to upcoming procedure: see above      See problem list for active medical problems.  Problems all longstanding and stable, except as noted/documented.  See ROS for pertinent symptoms related to these conditions.                                                                                                  .    MEDICAL HISTORY:                                                    Patient Active Problem List    Diagnosis Date Noted     JAZMIN (obstructive sleep apnea) 08/18/2015     Priority: Medium     Severe JAZMIN   - HST on 8/17/2015 with weight 240 lbs, AHI 34, chris desat 86%, strong positional component.  Events labeled as central suspected to be post-arousal and feel it is most consistent with JAZMIN       Pre-diabetes 07/07/2015     Priority: Medium     Nonalcoholic steatohepatitis (WILKINSON) 05/26/2015     Priority: Medium     Fatty liver seen on US.  Had mild transaminitis       Meniscus tear 09/25/2013     Priority: Medium     Hyperlipidemia LDL goal <160 12/21/2010     Priority: Medium      History reviewed. No pertinent past medical history.  Past Surgical History:   Procedure Laterality Date     ARTHROSCOPY ANKLE, OPEN REPAIR LIGAMENT, COMBINED Right 1/5/2017    Procedure: COMBINED ARTHROSCOPY ANKLE, OPEN REPAIR LIGAMENT;  Surgeon: Avinash Dick DPM;  Location: [facility]     ARTHROSCOPY KNEE IRRIGATION AND DEBRIDEMENT  9/27/2013    Procedure: ARTHROSCOPY KNEE IRRIGATION AND DEBRIDEMENT;;  Surgeon: Ley, Jeffrey Duane, MD;  Location: [facility]     ARTHROSCOPY KNEE WITH MEDIAL MENISCECTOMY  9/27/2013    Procedure: ARTHROSCOPY KNEE WITH MEDIAL MENISCECTOMY;  Right Knee Arthroscopy Partial Menisectomy and  "Debridement,Left Knee Arthroscopy Debridement and Loose Body Removal--needs assist.;  Surgeon: Ley, Jeffrey Duane, MD;  Location: [facility] OR     COLONOSCOPY N/A 8/17/2016    Procedure: COLONOSCOPY;  Surgeon: Rudolph Nazario MD;  Location: [facility]     HEMORRHOIDECTOMY  2000    Internal and external hemorrhoidectomy     SURGICAL HISTORY OF -   6/2001    Left Achilles tendon repair     Current Outpatient Prescriptions   Medication Sig Dispense Refill     colchicine (COLCRYS) 0.6 MG tablet 2 tabs at the onset of flare, then 1 tab every 2 hrs until pain is better or diarrhea occurs, up to 10 doses. Wait 3 days until taking again 30 tablet 0     order for DME CAM walker - short 1 Device 0     order for DME Equipment being ordered: CPAP Daniel Schultz received a [employer] Auto CPAP Auto. Pressures were set at Auto 8 - 15 cm H2O.       OTC products: None, except as noted above    Allergies   Allergen Reactions     Nkda [No Known Drug Allergies]       Latex Allergy: NO    Social History   Substance Use Topics     Smoking status: Never Smoker     Smokeless tobacco: Never Used     Alcohol use 0.0 oz/week     0 Standard drinks or equivalent per week      Comment: every other week     History   Drug Use No       REVIEW OF SYSTEMS:                                                    C: NEGATIVE for fever, chills, change in weight  E/M: NEGATIVE for ear, mouth and throat problems  R: NEGATIVE for significant cough or SOB  CV: NEGATIVE for chest pain, palpitations or peripheral edema    He had a viral gastroenteritis last week and is now back to normal.     EXAM:                                                    /80  Pulse 77  Temp 97.8  F (36.6  C) (Oral)  Ht 5' 10\" (1.778 m)  Wt 223 lb (101.2 kg)  BMI 32 kg/m2  Exam:  GENERAL APPEARANCE: healthy, alert and no distress  EYES: EOMI,  PERRL  HENT: ear canals and TM's normal and nose and mouth without ulcers or lesions  NECK: no adenopathy, no " asymmetry, masses, or scars and thyroid normal to palpation  RESP: lungs clear to auscultation - no rales, rhonchi or wheezes  CV: regular rates and rhythm, normal S1 S2, no S3 or S4 and no murmur, click or rub -  ABDOMEN:  soft, nontender, no HSM or masses and bowel sounds normal  GU_male: testicles normal without atrophy or masses, no hernias and penis normal without urethral discharge  MS: well healed right ankle scars.   SKIN: no suspicious lesions or rashes  NEURO: Normal strength and tone, sensory exam grossly normal, mentation intact and speech normal  PSYCH: mentation appears normal and affect normal/bright  LYMPHATICS: No axillary, cervical, inguinal, or supraclavicular nodes      DIAGNOSTICS:                                                      EKG: appears normal, NSR, normal axis, normal intervals, no acute ST/T changes c/w ischemia, no LVH by voltage criteria, unchanged from previous tracings  Labs Drawn and in Process:   Unresulted Labs Ordered in the Past 30 Days of this Admission     No orders found from 2017 to 2018.          Recent Labs   Lab Test  17   0928  05/21/15   1004  05/16/15   1012  13   0900   HGB   --    --   15.5  15.3   PLT   --    --   216  213   NA   --   140  141   --    POTASSIUM   --   4.1  3.6   --    CR   --   1.12  1.04   --    A1C  5.8  6.1*   --    --         IMPRESSION:                                                    Reason for surgery/procedure: Daniel Schultz (: 1965) presents for pre-operative evaluation assessment as requested by Dr. Dick.  He requires evaluation and anesthesia risk assessment prior to undergoing surgery/procedure for treatment of a tendon abnormality in the right ankle.  Proposed procedure: Right Ankle Posterior Tibial Tendon Repair,Peroneus Brevis Repair.    The proposed surgical procedure is considered LOW risk.    REVISED CARDIAC RISK INDEX  The patient has the following serious cardiovascular risks for  perioperative complications such as (MI, PE, VFib and 3  AV Block):  No serious cardiac risks  INTERPRETATION: 0 risks: Class I (very low risk - 0.4% complication rate)    The patient has the following additional risks for perioperative complications:  No identified additional risks      ICD-10-CM    1. Preop general physical exam Z01.818        RECOMMENDATIONS:                                                        --Patient is to take all scheduled medications on the day before surgery EXCEPT for modifications listed below.  Your stomach should be empty for 8 hours before surgery.   Take no aspirin or advil or aleve for one week before surgery. Tylenol is OK.     APPROVAL GIVEN to proceed with proposed procedure, without further diagnostic evaluation       Signed Electronically by: Reuben Rodriguez MD    Copy of this evaluation report is provided to requesting physician.    [facility] Preop Guidelines

## 2018-01-25 NOTE — ANESTHESIA PREPROCEDURE EVALUATION
Anesthesia Evaluation     . Pt has had prior anesthetic. Type: General and MAC    No history of anesthetic complications          ROS/MED HX    ENT/Pulmonary:     (+)sleep apnea, , . .    Neurologic:  - neg neurologic ROS     Cardiovascular:     (+) Dyslipidemia, ----. : . . . :. .       METS/Exercise Tolerance:  >4 METS   Hematologic:  - neg hematologic  ROS       Musculoskeletal:  - neg musculoskeletal ROS       GI/Hepatic: Comment: WILKINSON    (+) liver disease,       Renal/Genitourinary:  - ROS Renal section negative       Endo:  - neg endo ROS       Psychiatric:  - neg psychiatric ROS       Infectious Disease:  - neg infectious disease ROS       Malignancy:      - no malignancy   Other:    - neg other ROS                 Physical Exam  Normal systems: cardiovascular, pulmonary and dental    Airway   Mallampati: II  TM distance: >3 FB  Neck ROM: full    Dental     Cardiovascular   Rhythm and rate: regular and normal      Pulmonary    breath sounds clear to auscultation                    Anesthesia Plan      History & Physical Review  History and physical reviewed and following examination; no interval change.    ASA Status:  2 .    NPO Status:  > 8 hours    Plan for General and Periph. Nerve Block for postop pain with Intravenous and Propofol induction. Maintenance will be Balanced.    PONV prophylaxis:  Ondansetron (or other 5HT-3) and Dexamethasone or Solumedrol  Additional equipment: Videolaryngoscope GEN + right popliteal (sciatic/saphenous) nerve block for post-op pain control.  Pt. In agreement with plan.      Postoperative Care  Postoperative pain management:  IV analgesics, Oral pain medications and Peripheral nerve block (Single Shot).      Consents  Anesthetic plan, risks, benefits and alternatives discussed with:  Patient..                          .

## 2018-01-25 NOTE — OP NOTE
[facility] OPERATIVE REPORT  Operative Report - Orthopedics  Daniel Schultz,  1965, MRN [id number]    Surgery Date: 18    PCP: No Fierro, [phone]   Code status:  No Order       OPERATION SITE:  [facility] operating Room       OPERATIVE REPORT  DR. BROCK FOURNIER  FOOT & ANKLE SURGEON  [facility]    DATE OF PROCEDURE: 18    SITE: [facility] operating Room    SURGEON: Dr. Brock Fournier - [facility]    ASSISTANT: None    PREOPERATIVE DIAGNOSES:  1.  Right ankle posterior tibial tendinopathy with deltoid insufficiency post injury  2.  Right ankle peroneal tendinopathy with longitudinal splitting    POSTOPERATIVE DIAGNOSES:  1.  Right ankle posterior tibial tendinopathy with deltoid insufficiency post injury  2.  Right ankle peroneal tendinopathy with longitudinal splitting    PROCEDURES PERFORMED:  1.  Right posterior tibial/flexor tendon repair  2.  Right medial ankle collateral ankle ligament/deltoid ligamentous repair with imbrication  3.  Right peroneal brevis and longus/flexor tendon repair with tenodesis procedures  4.  Posterior splint application below the knee ankle neutral    ANESTHESIA: General with popliteal block performed under ultrasonic guidance    POSITION: Supine    HEMOSTASIS: Right thigh tourniquet at 300 mmHg for the duration of procedure    ESTIMATED BLOOD LOSS: 10 mL    FINDINGS: Medial posterior tibial tendinopathy with enlargement and splitting; deltoid insufficiency and partial tear/attenuation of the anterior, inferior aspect.  Positive peroneal tendinopathy distinct of the peroneus longus and brevis with longitudinal splitting of both necessitating the tenodesis.    IMPLANTS: Arthrex bio suture tack anchors utilized to complete the medial ankle collateral ligament repair.  Additionally, to complete the tenodesis and tendon repair procedures interlocking 2-0 interwoven FiberWire utilized.    SPECIMENS:  None.    INDICATIONS FOR THE OPERATION:   52 year old male has been seen and evaluated for the previously listed diagnoses.   Both operative and non operative care options have been reviewed for this condition.  They are aware of the operation implications and associated pros, cons, risks and benefits.  They were made aware of the post-operative demands and details.  The patient gives verbal and written consent to the above mentioned procedures.  Please see clinical notes and MRI report for further details.      DESCRIPTION OF THE PROCEDURE:  The patient was identified in the preoperative holding area.  The surgical site was marked.  The operative extremity was initialed.  The History and Physical examination was reviewed and/or updated as necessary.  The operative consent was reviewed, confirmed and signed by the patient and procedural details were again reviewed with patient and family members present.    The patient was then taken to the operating room assigned and was positioned on the operative table.  Anesthesia was then administered and the airway was maintained.  IV antibiotics were administered.  The tourniquet was applied.  Right foot and ankle margins were then prepped and draped in a sterile manner/aseptic technique.  A surgical time-out was then performed to identify the proper patient, surgical site(s) and the procedures to be performed.  Following this, local anesthetic was administered about the operative site utilizing a mixture of 1:1 2% Lidocaine plain and 0.5% Marcaine plain, for a total of 30 mL's.  The esmarch was then utilized to exsanguinate the patient's extremity and the tourniquet was inflated for the duration of the procedures.    Attention was then directed to the medial aspect of his right ankle where a #15 blade was utilized to make an incision from the medial malleolus distally to the region of the navicular.  This was carefully dissected down in layers with neurovascular  identification and retraction.  The posterior tibial tendon sheath was opened up.  Collected fluid was here.  The posterior tibial tendon was directly inspected and found to be with bulbous thickening and gross tendinopathy.  The tendon neuropathic/diseased section of this flexor tendon were debrided via micro-tenotomy.  This did not necessitate complete resection or FDL transfer.  The tendon sheath/tenosynovectomy was conducted.  The underlying deltoid ligament was found to be partially torn and certainly insufficient with attenuation and evidence of prior injury.  Secondary to this was partially dissected off of the anterior and inferior colliculus.  This was partially decorticated with a rasp and arrived here.  3.0 bio suture tack anchors then placed into the ileal inferior tibia/malleoli region.  These were then oversewn into the patient's native deltoid ligament anteriorly and inferiorly both deep and superficial and imbricated back onto the medial periosteum to imbricate and repair the deltoid ligament.  This was improved in regard to stability and eversion postrepair.  The site was irrigated and closed in anatomical layers.    Attention was then directed to the lateral margin or posterior fibular incision following the course of the posterior fibula and peroneal tendons was conducted with a #15 blade.  This was carefully dissected down in layers with neurovascular edification and retraction.  The tendons were exposed and found to be certainly traumatic with longitudinal splitting of both the peroneus brevis and longus tendons necessitating the repair.  The underlying prior repaired ligaments appear to be sufficient regard to inversion anterior drawer and talar stability.  The peroneal tendons both longus and brevis were debrided via micro-tenotomy.  Secondary to volume loss tenodesis of these combined tendons was completed with interwoven interlocking running 2-0 FiberWire suture.  Improved tendon bulk  strength and gliding was appreciated postrepair.  Additional debridement of the tendon sheath was conducted.    Following this, thorough irrigation was conducted.  Layered, anatomic closure completed in with 2-0 and 3-0 vicryl suture and skin repproximated with 3-0 nylon sutures.  A sterile compressive, layered dressing was then applied.  Vascular status was intact after deflation of the tourniquet.    SPLINT:  A custom, posterior neutral, fiberglass, below-knee, ankle splint was then applied with the ankle in the appropriate position.      COMPLICATIONS: No direct complications were  encountered throughout the procedures.    The patient tolerated these procedures well and was transferred from the operative table to the transport cart and taken from the OR to the PACU.  In PACU, patient and staff given orders and instructions for post-operative care in the following areas: post op pain management, weight-bearing status, dressing/splint care, weight-bearing assistive devices, elevation of the extremity, ice/cold therapy, DVT/blood clot prevention, nutrition and post-operative concerns to be aware.  Case and post-operative care measures were reviewed with the patient and family members present.  A post operative instruction sheet was provided.  If concerns or questions arise they will contact our clinic.  If acute concerns develop they will present emergently to a nearby medical center.      Please note that voice recognition software utilized to complete this documentation.  Although every effort has been made to correct errors, errors may still remain.      Dr. Avinash Dick, DPKELLEN, FACFAS  Foot & Ankle Surgeon/Specialist  [facility]              CC: [facility] - [facility]

## 2018-01-25 NOTE — OR NURSING
Pt is ready for surgery with questions answered. Report given to greg alfonso for continued care preop.

## 2018-02-08 ENCOUNTER — TRANSFERRED RECORDS (OUTPATIENT)
Dept: HEALTH INFORMATION MANAGEMENT | Facility: CLINIC | Age: 53
End: 2018-02-08

## 2018-03-01 ENCOUNTER — TRANSFERRED RECORDS (OUTPATIENT)
Dept: HEALTH INFORMATION MANAGEMENT | Facility: CLINIC | Age: 53
End: 2018-03-01

## 2018-03-08 ENCOUNTER — HOSPITAL ENCOUNTER (OUTPATIENT)
Dept: PHYSICAL THERAPY | Facility: CLINIC | Age: 53
Setting detail: THERAPIES SERIES
End: 2018-03-08
Attending: PODIATRIST
Payer: COMMERCIAL

## 2018-03-08 PROCEDURE — 97161 PT EVAL LOW COMPLEX 20 MIN: CPT | Mod: GP | Performed by: PHYSICAL THERAPIST

## 2018-03-08 PROCEDURE — 40000718 ZZHC STATISTIC PT DEPARTMENT ORTHO VISIT: Performed by: PHYSICAL THERAPIST

## 2018-03-08 PROCEDURE — 97110 THERAPEUTIC EXERCISES: CPT | Mod: GP | Performed by: PHYSICAL THERAPIST

## 2018-03-08 PROCEDURE — 97140 MANUAL THERAPY 1/> REGIONS: CPT | Mod: GP | Performed by: PHYSICAL THERAPIST

## 2018-03-08 NOTE — PROGRESS NOTES
PHYSICAL THERAPY INITIAL EVALUATION  03/08/18 1000   General Information   Type of Visit Initial OP Ortho PT Evaluation   Start of Care Date 03/08/18   Referring Physician Dr. Dick   Patient/Family Goals Statement be able to golf    Orders Evaluate and Treat   Orders Comment PWBAT, 12 visits   Date of Order 03/01/18   Insurance Type [employer]   Insurance Comments/Visits Authorized no limits   Medical Diagnosis right ankle posterior tibial tendon repair DOS: 1/25/18   Surgical/Medical history reviewed Yes   Precautions/Limitations no known precautions/limitations   Weight-Bearing Status - RLE weight-bearing as tolerated   Presentation and Etiology   Pertinent history of current problem (include personal factors and/or comorbidities that impact the POC) Surgery on 1/25/18. Placed in a cast initially a surgery, then placed in a boot for about 2-3 weeks. Now wearing ankle brace in shoe. If he is on it for awhile it swells up. Will take advil as needed.     Impairments A. Pain;C. Swelling;D. Decreased ROM;E. Decreased flexibility;F. Decreased strength and endurance;G. Impaired balance;H. Impaired gait;K. Numbness;L. Tingling   Functional Limitations perform activities of daily living;perform required work activities;perform desired leisure / sports activities   Symptom Location R ankle   How/Where did it occur Other  (post-surgical)   Onset date of current episode/exacerbation 01/25/18   Chronicity New   Pain rating (0-10 point scale) Best (/10);Worst (/10)   Best (/10) 3   Worst (/10) 7   Pain quality B. Dull;C. Aching   Frequency of pain/symptoms A. Constant   Pain/symptoms exacerbated by B. Walking   Pain/symptoms eased by A. Sitting;C. Rest;H. Cold;I. OTC medication(s);J. Braces/supports   Progression of symptoms since onset: Improved   Prior Level of Function   Prior Level of Function-Mobility independent   Prior Level of Function-ADLs independent   Current Level of Function   Patient role/employment history  [employer] Employed   Employment Comments , coaches basketball    Current equipment-Gait/Locomotion None   Fall Risk Screen   Fall screen completed by PT   Have you fallen 2 or more times in the past year? No   Have you fallen and had an injury in the past year? No   Is patient a fall risk? No   Functional Scales   Functional Scales Other   Other Scales  LEFS: 42/80   Ankle/Foot Objective Findings   Side (if bilateral, select both right and left) Right;Left   Integumentary R 63.5 cm  L 58 cm w/figure 8. Incision medial and lateral ankle are well healed, mild-moderate hypomobility medial scar, mild hypomobility lateral scar     Gait/Locomotion decreased toe off, decreased WS over to R LE, limps   Right DF (Knee Ext) AROM 3   Right PF AROM 35   Right Calcanceal Inversion AROM 25   Right Calcaneal Eversion AROM 10   Right Gastroc (in WB) Flexibility R mod tight   Right Soleus (in WB) Flexibility R mod tight   Palpation unremarkable   Accessory Motion/Joint Mobility tight talocrural joint   Left DF (Knee Ext) AROM 15   Left PF AROM 60   Left Calcanceal Inversion AROM 45   Left Calcaneal Eversion AROM 30   Balance/ Proprioception (Single Leg Stance) unable to hold single leg stance on R   Anterior Drawer Test -   Posterior Drawer Test -   Talar Tilt Test -   Planned Therapy Interventions   Planned Therapy Interventions balance training;gait training;joint mobilization;manual therapy;neuromuscular re-education;ROM;strengthening;stretching   Clinical Impression   Criteria for Skilled Therapeutic Interventions Met yes, treatment indicated   PT Diagnosis right ankle pain, decreased ROM, decreased strength      Influenced by the following impairments right ankle pain, decreased ROM, decreased strength      Functional limitations due to impairments walking, stairs, running, dressing    Clinical Presentation Stable/Uncomplicated   Clinical Presentation Rationale progressing as expected without complications   Clinical Decision  Making (Complexity) Low complexity   Therapy Frequency 2 times/Week   Predicted Duration of Therapy Intervention (days/wks) 3-4 weeks, 1x week for 4 weeks, 1x every other week 4    Risk & Benefits of therapy have been explained Yes   Patient, Family & other staff in agreement with plan of care Yes   Clinical Impression Comments Patient is s/p R ankle posterior tibialis tendon repair, deltoid ligament repair, and peroneal brevis and longus tendon repair.    Education Assessment   Preferred Learning Style Listening;Demonstration;Pictures/video   Barriers to Learning No barriers   ORTHO GOALS   PT Ortho Eval Goals 1;2;3;4   Ortho Goal 1   Goal Identifier 1   Goal Description Patient will be able to walk without a limp.    Target Date 04/19/18   Ortho Goal 2   Goal Identifier 2   Goal Description Patient will be able to ascend/descend stairs without a rail demonstrating normal gait mechanics and minimal pain or difficulty.   Target Date 05/03/18   Ortho Goal 3   Goal Identifier 3   Goal Description Patient will be able to walk 1 mile with minimal pain or difficulty.   Target Date 05/31/18   Ortho Goal 4   Goal Identifier 4   Goal Description Patient will be able to return to golf with minimal pain or difficulty.   Target Date 05/31/18   Total Evaluation Time   Total Evaluation Time 20       Please contact me with any questions or concerns.  Thank you for your referral.    Brit Pérez, PT, DPT, OCS  Physical Therapist, Orthopedic Certified Specialist  [facility]  [phone]

## 2018-03-19 ENCOUNTER — HOSPITAL ENCOUNTER (OUTPATIENT)
Dept: PHYSICAL THERAPY | Facility: CLINIC | Age: 53
Setting detail: THERAPIES SERIES
End: 2018-03-19
Attending: PODIATRIST
Payer: COMMERCIAL

## 2018-03-19 PROCEDURE — 97110 THERAPEUTIC EXERCISES: CPT | Mod: GP | Performed by: PHYSICAL THERAPIST

## 2018-03-19 PROCEDURE — 97140 MANUAL THERAPY 1/> REGIONS: CPT | Mod: GP | Performed by: PHYSICAL THERAPIST

## 2018-03-19 PROCEDURE — 40000718 ZZHC STATISTIC PT DEPARTMENT ORTHO VISIT: Performed by: PHYSICAL THERAPIST

## 2018-03-21 ENCOUNTER — HOSPITAL ENCOUNTER (OUTPATIENT)
Dept: PHYSICAL THERAPY | Facility: CLINIC | Age: 53
Setting detail: THERAPIES SERIES
End: 2018-03-21
Attending: PODIATRIST
Payer: COMMERCIAL

## 2018-03-21 PROCEDURE — 97110 THERAPEUTIC EXERCISES: CPT | Mod: GP | Performed by: PHYSICAL THERAPIST

## 2018-03-21 PROCEDURE — 40000718 ZZHC STATISTIC PT DEPARTMENT ORTHO VISIT: Performed by: PHYSICAL THERAPIST

## 2018-03-21 PROCEDURE — 97140 MANUAL THERAPY 1/> REGIONS: CPT | Mod: GP | Performed by: PHYSICAL THERAPIST

## 2018-03-27 ENCOUNTER — HOSPITAL ENCOUNTER (OUTPATIENT)
Dept: PHYSICAL THERAPY | Facility: CLINIC | Age: 53
Setting detail: THERAPIES SERIES
End: 2018-03-27
Attending: PODIATRIST
Payer: COMMERCIAL

## 2018-03-27 PROCEDURE — 40000718 ZZHC STATISTIC PT DEPARTMENT ORTHO VISIT: Performed by: PHYSICAL THERAPIST

## 2018-03-27 PROCEDURE — 97140 MANUAL THERAPY 1/> REGIONS: CPT | Mod: GP | Performed by: PHYSICAL THERAPIST

## 2018-03-27 PROCEDURE — 97110 THERAPEUTIC EXERCISES: CPT | Mod: GP | Performed by: PHYSICAL THERAPIST

## 2018-03-29 ENCOUNTER — HOSPITAL ENCOUNTER (OUTPATIENT)
Dept: PHYSICAL THERAPY | Facility: CLINIC | Age: 53
Setting detail: THERAPIES SERIES
End: 2018-03-29
Attending: PODIATRIST
Payer: COMMERCIAL

## 2018-03-29 PROCEDURE — 40000718 ZZHC STATISTIC PT DEPARTMENT ORTHO VISIT: Performed by: PHYSICAL THERAPIST

## 2018-03-29 PROCEDURE — 97140 MANUAL THERAPY 1/> REGIONS: CPT | Mod: GP | Performed by: PHYSICAL THERAPIST

## 2018-03-29 PROCEDURE — 97112 NEUROMUSCULAR REEDUCATION: CPT | Mod: GP | Performed by: PHYSICAL THERAPIST

## 2018-04-04 ENCOUNTER — HOSPITAL ENCOUNTER (OUTPATIENT)
Dept: PHYSICAL THERAPY | Facility: CLINIC | Age: 53
Setting detail: THERAPIES SERIES
End: 2018-04-04
Attending: PODIATRIST
Payer: COMMERCIAL

## 2018-04-04 PROCEDURE — 40000718 ZZHC STATISTIC PT DEPARTMENT ORTHO VISIT: Performed by: PHYSICAL THERAPIST

## 2018-04-04 PROCEDURE — 97112 NEUROMUSCULAR REEDUCATION: CPT | Mod: GP | Performed by: PHYSICAL THERAPIST

## 2018-04-04 PROCEDURE — 97140 MANUAL THERAPY 1/> REGIONS: CPT | Mod: GP | Performed by: PHYSICAL THERAPIST

## 2018-04-11 ENCOUNTER — HOSPITAL ENCOUNTER (OUTPATIENT)
Dept: PHYSICAL THERAPY | Facility: CLINIC | Age: 53
Setting detail: THERAPIES SERIES
End: 2018-04-11
Attending: PODIATRIST
Payer: COMMERCIAL

## 2018-04-11 PROCEDURE — 97110 THERAPEUTIC EXERCISES: CPT | Mod: GP | Performed by: PHYSICAL THERAPIST

## 2018-04-11 PROCEDURE — 97112 NEUROMUSCULAR REEDUCATION: CPT | Mod: GP | Performed by: PHYSICAL THERAPIST

## 2018-04-11 PROCEDURE — 40000718 ZZHC STATISTIC PT DEPARTMENT ORTHO VISIT: Performed by: PHYSICAL THERAPIST

## 2018-04-17 ENCOUNTER — HOSPITAL ENCOUNTER (OUTPATIENT)
Dept: MRI IMAGING | Facility: CLINIC | Age: 53
Discharge: HOME | End: 2018-04-17
Attending: PODIATRIST | Admitting: PODIATRIST
Payer: COMMERCIAL

## 2018-04-17 DIAGNOSIS — M25.571 RIGHT ANKLE PAIN: ICD-10-CM

## 2018-04-17 PROCEDURE — 73721 MRI JNT OF LWR EXTRE W/O DYE: CPT | Mod: RT

## 2018-04-19 ENCOUNTER — TRANSFERRED RECORDS (OUTPATIENT)
Dept: HEALTH INFORMATION MANAGEMENT | Facility: CLINIC | Age: 53
End: 2018-04-19

## 2018-04-24 ENCOUNTER — ANESTHESIA EVENT (OUTPATIENT)
Dept: SURGERY | Facility: CLINIC | Age: 53
End: 2018-04-24
Payer: COMMERCIAL

## 2018-04-26 ENCOUNTER — HOSPITAL ENCOUNTER (OUTPATIENT)
Facility: CLINIC | Age: 53
Discharge: HOME | End: 2018-04-26
Attending: PODIATRIST | Admitting: PODIATRIST
Payer: COMMERCIAL

## 2018-04-26 ENCOUNTER — ANESTHESIA (OUTPATIENT)
Dept: SURGERY | Facility: CLINIC | Age: 53
End: 2018-04-26
Payer: COMMERCIAL

## 2018-04-26 VITALS
HEIGHT: 71 IN | SYSTOLIC BLOOD PRESSURE: 125 MMHG | BODY MASS INDEX: 32.2 KG/M2 | RESPIRATION RATE: 20 BRPM | TEMPERATURE: 98 F | HEART RATE: 70 BPM | OXYGEN SATURATION: 92 % | DIASTOLIC BLOOD PRESSURE: 90 MMHG | WEIGHT: 230 LBS

## 2018-04-26 DIAGNOSIS — Z91.89: ICD-10-CM

## 2018-04-26 DIAGNOSIS — G89.18 POST-OP PAIN: Primary | ICD-10-CM

## 2018-04-26 PROCEDURE — 25000128 H RX IP 250 OP 636: Performed by: NURSE ANESTHETIST, CERTIFIED REGISTERED

## 2018-04-26 PROCEDURE — 71000027 ZZH RECOVERY PHASE 2 EACH 15 MINS: Performed by: PODIATRIST

## 2018-04-26 PROCEDURE — 71000012 ZZH RECOVERY PHASE 1 LEVEL 1 FIRST HR: Performed by: PODIATRIST

## 2018-04-26 PROCEDURE — 25000125 ZZHC RX 250: Performed by: NURSE ANESTHETIST, CERTIFIED REGISTERED

## 2018-04-26 PROCEDURE — 36000058 ZZH SURGERY LEVEL 3 EA 15 ADDTL MIN: Performed by: PODIATRIST

## 2018-04-26 PROCEDURE — 27210794 ZZH OR GENERAL SUPPLY STERILE: Performed by: PODIATRIST

## 2018-04-26 PROCEDURE — 36000056 ZZH SURGERY LEVEL 3 1ST 30 MIN: Performed by: PODIATRIST

## 2018-04-26 PROCEDURE — C1713 ANCHOR/SCREW BN/BN,TIS/BN: HCPCS | Performed by: PODIATRIST

## 2018-04-26 PROCEDURE — 25000566 ZZH SEVOFLURANE, EA 15 MIN: Performed by: PODIATRIST

## 2018-04-26 PROCEDURE — 37000008 ZZH ANESTHESIA TECHNICAL FEE, 1ST 30 MIN: Performed by: PODIATRIST

## 2018-04-26 PROCEDURE — 37000009 ZZH ANESTHESIA TECHNICAL FEE, EACH ADDTL 15 MIN: Performed by: PODIATRIST

## 2018-04-26 PROCEDURE — 40000305 ZZH STATISTIC PRE PROC ASSESS I: Performed by: PODIATRIST

## 2018-04-26 PROCEDURE — 25000125 ZZHC RX 250: Performed by: PODIATRIST

## 2018-04-26 DEVICE — IMPLANTABLE DEVICE: Type: IMPLANTABLE DEVICE | Site: ANKLE | Status: FUNCTIONAL

## 2018-04-26 RX ORDER — FENTANYL CITRATE 50 UG/ML
INJECTION, SOLUTION INTRAMUSCULAR; INTRAVENOUS PRN
Status: DISCONTINUED | OUTPATIENT
Start: 2018-04-26 | End: 2018-04-26

## 2018-04-26 RX ORDER — NALOXONE HYDROCHLORIDE 0.4 MG/ML
.1-.4 INJECTION, SOLUTION INTRAMUSCULAR; INTRAVENOUS; SUBCUTANEOUS
Status: DISCONTINUED | OUTPATIENT
Start: 2018-04-26 | End: 2018-04-26 | Stop reason: HOSPADM

## 2018-04-26 RX ORDER — FENTANYL CITRATE 50 UG/ML
25-50 INJECTION, SOLUTION INTRAMUSCULAR; INTRAVENOUS
Status: DISCONTINUED | OUTPATIENT
Start: 2018-04-26 | End: 2018-04-26 | Stop reason: HOSPADM

## 2018-04-26 RX ORDER — OXYCODONE AND ACETAMINOPHEN 10; 325 MG/1; MG/1
1-2 TABLET ORAL EVERY 4 HOURS PRN
Qty: 28 TABLET | Refills: 0 | Status: SHIPPED | OUTPATIENT
Start: 2018-04-26 | End: 2018-08-22

## 2018-04-26 RX ORDER — CEFAZOLIN SODIUM 2 G/100ML
2 INJECTION, SOLUTION INTRAVENOUS
Status: DISCONTINUED | OUTPATIENT
Start: 2018-04-26 | End: 2018-04-26 | Stop reason: HOSPADM

## 2018-04-26 RX ORDER — HYDROXYZINE HYDROCHLORIDE 25 MG/1
25 TABLET, FILM COATED ORAL EVERY 6 HOURS PRN
Qty: 26 TABLET | Refills: 0 | Status: SHIPPED | OUTPATIENT
Start: 2018-04-26 | End: 2018-08-22

## 2018-04-26 RX ORDER — SODIUM CHLORIDE, SODIUM LACTATE, POTASSIUM CHLORIDE, CALCIUM CHLORIDE 600; 310; 30; 20 MG/100ML; MG/100ML; MG/100ML; MG/100ML
INJECTION, SOLUTION INTRAVENOUS CONTINUOUS
Status: DISCONTINUED | OUTPATIENT
Start: 2018-04-26 | End: 2018-04-26 | Stop reason: HOSPADM

## 2018-04-26 RX ORDER — HYDROMORPHONE HYDROCHLORIDE 1 MG/ML
.3-.5 INJECTION, SOLUTION INTRAMUSCULAR; INTRAVENOUS; SUBCUTANEOUS EVERY 10 MIN PRN
Status: DISCONTINUED | OUTPATIENT
Start: 2018-04-26 | End: 2018-04-26 | Stop reason: HOSPADM

## 2018-04-26 RX ORDER — CEFAZOLIN SODIUM 1 G/50ML
1 INJECTION, SOLUTION INTRAVENOUS SEE ADMIN INSTRUCTIONS
Status: DISCONTINUED | OUTPATIENT
Start: 2018-04-26 | End: 2018-04-26 | Stop reason: HOSPADM

## 2018-04-26 RX ORDER — LIDOCAINE HYDROCHLORIDE 20 MG/ML
INJECTION, SOLUTION INFILTRATION; PERINEURAL PRN
Status: DISCONTINUED | OUTPATIENT
Start: 2018-04-26 | End: 2018-04-26 | Stop reason: HOSPADM

## 2018-04-26 RX ORDER — LIDOCAINE HYDROCHLORIDE 10 MG/ML
INJECTION, SOLUTION EPIDURAL; INFILTRATION; INTRACAUDAL; PERINEURAL PRN
Status: DISCONTINUED | OUTPATIENT
Start: 2018-04-26 | End: 2018-04-26

## 2018-04-26 RX ORDER — LIDOCAINE 40 MG/G
CREAM TOPICAL
Status: DISCONTINUED | OUTPATIENT
Start: 2018-04-26 | End: 2018-04-26 | Stop reason: HOSPADM

## 2018-04-26 RX ORDER — ALBUTEROL SULFATE 0.83 MG/ML
2.5 SOLUTION RESPIRATORY (INHALATION) EVERY 4 HOURS PRN
Status: DISCONTINUED | OUTPATIENT
Start: 2018-04-26 | End: 2018-04-26 | Stop reason: HOSPADM

## 2018-04-26 RX ORDER — BUPIVACAINE HYDROCHLORIDE 5 MG/ML
INJECTION, SOLUTION PERINEURAL PRN
Status: DISCONTINUED | OUTPATIENT
Start: 2018-04-26 | End: 2018-04-26 | Stop reason: HOSPADM

## 2018-04-26 RX ORDER — KETOROLAC TROMETHAMINE 30 MG/ML
INJECTION, SOLUTION INTRAMUSCULAR; INTRAVENOUS PRN
Status: DISCONTINUED | OUTPATIENT
Start: 2018-04-26 | End: 2018-04-26

## 2018-04-26 RX ORDER — OXYCODONE AND ACETAMINOPHEN 5; 325 MG/1; MG/1
1 TABLET ORAL
Status: DISCONTINUED | OUTPATIENT
Start: 2018-04-26 | End: 2018-04-26 | Stop reason: HOSPADM

## 2018-04-26 RX ORDER — ONDANSETRON 4 MG/1
4 TABLET, ORALLY DISINTEGRATING ORAL EVERY 30 MIN PRN
Status: DISCONTINUED | OUTPATIENT
Start: 2018-04-26 | End: 2018-04-26 | Stop reason: HOSPADM

## 2018-04-26 RX ORDER — PROPOFOL 10 MG/ML
INJECTION, EMULSION INTRAVENOUS PRN
Status: DISCONTINUED | OUTPATIENT
Start: 2018-04-26 | End: 2018-04-26

## 2018-04-26 RX ORDER — KETOROLAC TROMETHAMINE 30 MG/ML
30 INJECTION, SOLUTION INTRAMUSCULAR; INTRAVENOUS EVERY 6 HOURS PRN
Status: DISCONTINUED | OUTPATIENT
Start: 2018-04-26 | End: 2018-04-26 | Stop reason: HOSPADM

## 2018-04-26 RX ORDER — DEXAMETHASONE SODIUM PHOSPHATE 4 MG/ML
INJECTION, SOLUTION INTRA-ARTICULAR; INTRALESIONAL; INTRAMUSCULAR; INTRAVENOUS; SOFT TISSUE PRN
Status: DISCONTINUED | OUTPATIENT
Start: 2018-04-26 | End: 2018-04-26

## 2018-04-26 RX ORDER — ROPIVACAINE HYDROCHLORIDE 5 MG/ML
INJECTION, SOLUTION EPIDURAL; INFILTRATION; PERINEURAL PRN
Status: DISCONTINUED | OUTPATIENT
Start: 2018-04-26 | End: 2018-04-26

## 2018-04-26 RX ORDER — LIDOCAINE HYDROCHLORIDE 10 MG/ML
INJECTION, SOLUTION INFILTRATION; PERINEURAL PRN
Status: DISCONTINUED | OUTPATIENT
Start: 2018-04-26 | End: 2018-04-26

## 2018-04-26 RX ORDER — LIDOCAINE HYDROCHLORIDE AND EPINEPHRINE 15; 5 MG/ML; UG/ML
INJECTION, SOLUTION EPIDURAL PRN
Status: DISCONTINUED | OUTPATIENT
Start: 2018-04-26 | End: 2018-04-26

## 2018-04-26 RX ORDER — ONDANSETRON 2 MG/ML
INJECTION INTRAMUSCULAR; INTRAVENOUS PRN
Status: DISCONTINUED | OUTPATIENT
Start: 2018-04-26 | End: 2018-04-26

## 2018-04-26 RX ORDER — MEPERIDINE HYDROCHLORIDE 50 MG/ML
12.5 INJECTION INTRAMUSCULAR; INTRAVENOUS; SUBCUTANEOUS
Status: DISCONTINUED | OUTPATIENT
Start: 2018-04-26 | End: 2018-04-26 | Stop reason: HOSPADM

## 2018-04-26 RX ORDER — ONDANSETRON 2 MG/ML
4 INJECTION INTRAMUSCULAR; INTRAVENOUS EVERY 30 MIN PRN
Status: DISCONTINUED | OUTPATIENT
Start: 2018-04-26 | End: 2018-04-26 | Stop reason: HOSPADM

## 2018-04-26 RX ORDER — DEXAMETHASONE SODIUM PHOSPHATE 10 MG/ML
INJECTION, SOLUTION INTRAMUSCULAR; INTRAVENOUS PRN
Status: DISCONTINUED | OUTPATIENT
Start: 2018-04-26 | End: 2018-04-26

## 2018-04-26 RX ADMIN — LIDOCAINE HYDROCHLORIDE 50 MG: 10 INJECTION, SOLUTION INFILTRATION; PERINEURAL at 17:52

## 2018-04-26 RX ADMIN — FENTANYL CITRATE 100 MCG: 50 INJECTION, SOLUTION INTRAMUSCULAR; INTRAVENOUS at 16:26

## 2018-04-26 RX ADMIN — CEFAZOLIN SODIUM 2 G: 1 INJECTION, SOLUTION INTRAVENOUS at 17:50

## 2018-04-26 RX ADMIN — DEXAMETHASONE SODIUM PHOSPHATE 4 MG: 4 INJECTION, SOLUTION INTRA-ARTICULAR; INTRALESIONAL; INTRAMUSCULAR; INTRAVENOUS; SOFT TISSUE at 17:57

## 2018-04-26 RX ADMIN — SODIUM CHLORIDE, POTASSIUM CHLORIDE, SODIUM LACTATE AND CALCIUM CHLORIDE: 600; 310; 30; 20 INJECTION, SOLUTION INTRAVENOUS at 14:36

## 2018-04-26 RX ADMIN — LIDOCAINE HYDROCHLORIDE 1 ML: 10 INJECTION, SOLUTION EPIDURAL; INFILTRATION; INTRACAUDAL; PERINEURAL at 16:34

## 2018-04-26 RX ADMIN — HYDROMORPHONE HYDROCHLORIDE 0.5 MG: 1 INJECTION, SOLUTION INTRAMUSCULAR; INTRAVENOUS; SUBCUTANEOUS at 19:56

## 2018-04-26 RX ADMIN — DEXAMETHASONE SODIUM PHOSPHATE 2 MG: 10 INJECTION, SOLUTION INTRAMUSCULAR; INTRAVENOUS at 16:36

## 2018-04-26 RX ADMIN — KETOROLAC TROMETHAMINE 30 MG: 30 INJECTION, SOLUTION INTRAMUSCULAR; INTRAVENOUS at 18:59

## 2018-04-26 RX ADMIN — ROPIVACAINE HYDROCHLORIDE 30 ML: 5 INJECTION, SOLUTION EPIDURAL; INFILTRATION; PERINEURAL at 16:32

## 2018-04-26 RX ADMIN — ONDANSETRON 4 MG: 2 INJECTION INTRAMUSCULAR; INTRAVENOUS at 18:59

## 2018-04-26 RX ADMIN — ROPIVACAINE HYDROCHLORIDE 10 ML: 5 INJECTION, SOLUTION EPIDURAL; INFILTRATION; PERINEURAL at 16:36

## 2018-04-26 RX ADMIN — LIDOCAINE HYDROCHLORIDE 0.3 ML: 10 INJECTION, SOLUTION EPIDURAL; INFILTRATION; INTRACAUDAL; PERINEURAL at 14:36

## 2018-04-26 RX ADMIN — PROPOFOL 200 MG: 10 INJECTION, EMULSION INTRAVENOUS at 17:52

## 2018-04-26 RX ADMIN — SODIUM CHLORIDE, POTASSIUM CHLORIDE, SODIUM LACTATE AND CALCIUM CHLORIDE: 600; 310; 30; 20 INJECTION, SOLUTION INTRAVENOUS at 19:02

## 2018-04-26 RX ADMIN — DEXAMETHASONE SODIUM PHOSPHATE 8 MG: 10 INJECTION, SOLUTION INTRAMUSCULAR; INTRAVENOUS at 16:32

## 2018-04-26 RX ADMIN — LIDOCAINE HYDROCHLORIDE 1 ML: 10 INJECTION, SOLUTION EPIDURAL; INFILTRATION; INTRACAUDAL; PERINEURAL at 16:30

## 2018-04-26 RX ADMIN — Medication 0.5 MG: at 18:34

## 2018-04-26 RX ADMIN — Medication 0.5 MG: at 18:24

## 2018-04-26 ASSESSMENT — PAIN DESCRIPTION - DESCRIPTORS: DESCRIPTORS: BURNING

## 2018-04-26 NOTE — IP AVS SNAPSHOT
MRN:[id number]                      After Visit Summary   4/26/2018    Daniel Schultz    MRN: [id number]           Thank you!     Thank you for choosing [facility] for your care. Our goal is always to provide you with excellent care. Hearing back from our patients is one way we can continue to improve our services. Please take a few minutes to complete the written survey that you may receive in the mail after you visit with us. Thank you!        Patient Information     Date Of Birth          1965        About your hospital stay     You were admitted on:  April 26, 2018 You last received care in the:  [facility] PACU    You were discharged on:  April 26, 2018       Who to Call     For medical emergencies, please call 911.  For non-urgent questions about your medical care, please call your primary care provider or clinic, [phone]  For questions related to your surgery, please call your surgery clinic        Attending Provider     Provider Avinash Rivera DPM Podiatry       Primary Care Provider Office Phone # Fax #    No Mile Fierro [phone]      After Care Instructions     Activity       Ambulate with assistance until independent            Discharge Instructions       Review discharge instructions as directed by Provider.            Discharge Instructions       Patient to be seen in next 10-14 days.    Please call [facility] at [phone] to make/confirm appointment.            Elevate affected extremity           Ice to affected area       Ice pack to affected area PRN (as needed).            No dressing change       until follow up clinic appointment.            No driving or operating machinery       until the day after procedure            No weight bearing                 Further instructions from your care team                          Same Day Surgery Discharge Instructions  Special Precautions After Surgery -  Adult    1. It is not unusual to feel lightheaded or faint, up to 24 hours after surgery or while taking pain medication.  If you have these symptoms; sit for a few minutes before standing and have someone assist you when getting up.  2. You should rest and relax for the next 24 hours and must have someone stay with you for at least 24 hours after your discharge.  3. DO NOT DRIVE any vehicle or operate mechanical equipment for 24 hours following the end of your surgery.  DO NOT DRIVE while taking narcotic pain medications that have been prescribed by your physician.  If you had a limb operated on, you must be able to use it fully to drive.  4. DO NOT drink alcoholic beverages for 24 hours following surgery or while taking prescription pain medication.  5. Drink clear liquids (apple juice, ginger ale, broth, 7-Up, etc.).  Progress to your regular diet as you feel able.  6. Any questions call your physician and do not make important decisions for 24 hours.    ACTIVITY  ? Per MD instruction     INCISIONAL CARE  ? Per MD instruction      Medications:  Follow instructions on bottle     Nausea and Vomiting  What are nausea and vomiting?   Nausea is the queasy feeling you usually have before you vomit. Vomiting is the forceful emptying (throwing up) of the stomach's contents through the mouth.   What causes nausea and vomiting?   Nausea and vomiting are symptoms that may occur with many conditions, such as:   Anesthesia medications   side effect of narcotic medicines  exposure to unpleasant odors or sights   stress and anxiety     How is it treated?   At first you should rest your stomach for a few hours by eating nothing solid and sipping only clear liquids. A little later you can eat soft bland foods that are easy to digest.   It is important to drink small amounts (1 to 4 ounces) often so that you do not become dehydrated. Gradually drink larger amounts of the clear fluids. If you vomit, wait an hour, then start over  with a smaller amount of fluid.   Eat slowly and avoid foods that are acidic, spicy, fatty, or fibrous (such as meats, coarse grains, and raw vegetables). Also avoid extremely hot or cold food. In addition, avoid dairy products if you have diarrhea. You may start eating your normal diet again in 3 days or so, when all signs of illness have passed.   Rest as much as possible. Sit or lie down with your head propped up. Do not lie flat for at least 2 hours after eating. Nausea and vomiting usually last only a short period of time. If you have cramping or pain in your belly you can try putting a heating pad set at low or a covered hot water bottle on your belly. Never set a heating pad on high because you could get burned.   If you have been vomiting for more than a day or have had diarrhea for over 3 days, you may need to have an exam by your provider, including a check for dehydration. If you are very dehydrated, you may need to be given fluids intravenously (IV). In children and older adults dehydration can quickly become life threatening.   When should I call my healthcare provider?   Talk with your provider if you are unable to keep fluids down for more than 12 hours or if you have any of the following symptoms with nausea and vomiting:   severe headache or neck ache, or stiff neck   severe abdominal pain   diarrhea and vomiting that last more than 24 hours   blood in the vomited material that may look red, brown, or black, or like coffee grounds   bloody diarrhea   very forceful vomiting   signs of dehydration such as dry mouth, excessive thirst, little or no urination, severe weakness, dizziness, or lightheadedness.   If you have nausea and pain in the jaw, arm, shoulder, chest, or back; sweating; shortness of breath; or lightheadedness; call 911 for emergency care.     Breakthrough Bleeding    How/Why does it occur?   There are many causes of breakthrough bleeding onto your dressing. The two most common causes  are increased activity and increased NSAID use.     How is it treated?   The treatment for breakthrough dressing bleeding depends on the cause. For simple problems such as a saturated dressing, you may need to reinforce the dressing with more gauze and tape and put slight pressure on the site.  Call your healthcare provider if something else is causing bleeding.        Post-operative Infection  What is a wound infection?    watch for signs of infection. Signs that the wound/incision is infected include:   Pus or cloudy fluid draining from the incision.   A pimple or yellow crust forming on the incision   The scab is increasing in size.   Increasing redness occurs around the incisions  A red streak is spreading from the wound toward the heart.   The incision has become extremely tender and/or hot   The lymph node draining that area of skin may become large and tender.   You may develop a fever over 100?F (37.8?C).     What is the cause?   Most skin infections follow breaks in the skin (for example, surgery,  from cuts, puncture wounds, animal bites, splinters, thorns, or burns). Bacteria (especially staphylococcus or streptococcus) then invade the wound and cause the infection.    Deeper wounds (like surgical incisions) are much more likely to become infected than superficial wounds (for example, scrapes).     What is the treatment?   Call your doctor's clinic if you feel you have the beginnings of an infection   Antibiotics You will probably need antibiotics prescribed by your healthcare provider. This medicine will kill the germs that are causing the wound infection. Try not to forget any of the doses.  Even if you feel better in a few days, take the antibiotic until it is completely gone to keep the infection from flaring up again.    Fever and pain relief Take acetaminophen or ibuprofen if you develop a fever over 102?F (39?C)    How can I help prevent infections?   Wash around all new incisions vigorously with  "soap and water for 5 to 10 minutes to remove dirt and bacteria.      When should I call my healthcare provider?   Call IMMEDIATELY if:   The redness keeps spreading.   The wound becomes extremely painful.   Call during office hours if:   The fever is not gone 48 hours after you start taking an antibiotic.   The wound infection does not look better 3 days after your start taking an antibiotic.   The wound isn't completely healed within 10 days.   You have other questions or concerns.     __________________________________________________________________________________________________________________________________  IMPORTANT NUMBERS:    [facility] [place]  [phone], [phone]  Pharmacy:  [phone]  Same Day Surgery:  [phone], Monday - Friday until 8:30 p.m.  Urgent Care:  [phone]  Emergency Room:  [phone]      [facility]:  [phone]                                                                             [facility]:  [phone] option 1  [facility]:  [phone]     [facility] Clinic:  [phone]   Surgery Specialty Clinic:  [phone]   Home Medical Equipment: [phone]  [facility]:  [phone]        Pending Results     No orders found from 4/24/2018 to 4/27/2018.            Admission Information     Date & Time Provider Department Dept. Phone    4/26/2018 Avinash Dick DPM [facility] PACU [phone]      Your Vitals Were     Blood Pressure Pulse Temperature Respirations Height Weight    129/88 73 98.1  F (36.7  C) (Oral) 12 1.803 m (5' 11\") 104.3 kg (230 lb)    Pulse Oximetry BMI (Body Mass Index)                96% 32.08 kg/m2          [facility] Information     [facility] gives you secure access to your electronic health record. If you see a primary care provider, you can also send messages to your care team and make appointments. If you have questions, please call your primary care clinic.  If you do not have a " primary care provider, please call [phone] and they will assist you.        Care EveryWhere ID     This is your Care EveryWhere ID. This could be used by other organizations to access your [facility] medical records  [id number]        Equal Access to Services     JUVE OLSON : Hadii aad ku hadedefunmi Soeleanorali, watracida luqadaha, qaybta kaalmada adewalter, ysabel alka tingkarey honeycutt jhonathancullen alexandre. So [facility] [phone].    ATENCIÓN: Si habla español, tiene a liu disposición servicios gratuitos de asistencia lingüística. [facility] [phone].    We comply with applicable federal civil rights laws and [place] laws. We do not discriminate on the basis of race, color, national origin, age, disability, sex, sexual orientation, or gender identity.               Review of your medicines      START taking        Dose / Directions    aspirin 325 MG EC tablet   Used for:  Moderate risk factor score for venous thromboembolism        Dose:  325 mg   Take 1 tablet (325 mg) by mouth daily   Quantity:  30 tablet   Refills:  0       hydrOXYzine 25 MG tablet   Commonly known as:  ATARAX   Used for:  Post-op pain        Dose:  25 mg   Take 1 tablet (25 mg) by mouth every 6 hours as needed for itching (and nausea)   Quantity:  26 tablet   Refills:  0       oxyCODONE-acetaminophen  MG per tablet   Commonly known as:  PERCOCET   Used for:  Post-op pain        Dose:  1-2 tablet   Take 1-2 tablets by mouth every 4 hours as needed for pain (moderate to severe)   Quantity:  28 tablet   Refills:  0            Where to get your medicines      These medications were sent to [facility] [place], [place] - [address]  [address] [address]     Phone:  [phone]     aspirin 325 MG EC tablet    hydrOXYzine 25 MG tablet         Some of these will need a paper prescription and others can be bought over the counter. Ask your nurse if you have questions.     Bring a paper prescription for each of these  medications     oxyCODONE-acetaminophen  MG per tablet                Protect others around you: Learn how to safely use, store and throw away your medicines at www.disposemymeds.org.        Information about OPIOIDS     PRESCRIPTION OPIOIDS: WHAT YOU NEED TO KNOW   You have a prescription for an opioid (narcotic) pain medicine. Opioids can cause addiction. If you have a history of chemical dependency of any type, you are at a higher risk of becoming addicted to opioids. Only take this medicine after all other options have been tried. Take it for as short a time and as few doses as possible.     Do not:    Drive. If you drive while taking these medicines, you could be arrested for driving under the influence (DUI).    Operate heavy machinery    Do any other dangerous activities while taking these medicines.     Drink any alcohol while taking these medicines.      Take with any other medicines that contain acetaminophen. Read all labels carefully. Look for the word  acetaminophen  or  Tylenol.  Ask your pharmacist if you have questions or are unsure.    Store your pills in a secure place, locked if possible. We will not replace any lost or stolen medicine. If you don t finish your medicine, please throw away (dispose) as directed by your pharmacist. The [place] Pollution Control Agency has more information about safe disposal: https://www.pca.[facility].[place].us/living-green/managing-unwanted-medications    All opioids tend to cause constipation. Drink plenty of water and eat foods that have a lot of fiber, such as fruits, vegetables, prune juice, apple juice and high-fiber cereal. Take a laxative (Miralax, milk of magnesia, Colace, Senna) if you don t move your bowels at least every other day.              Medication List: This is a list of all your medications and when to take them. Check marks below indicate your daily home schedule. Keep this list as a reference.      Medications           Morning Afternoon  Evening Bedtime As Needed    aspirin 325 MG EC tablet   Take 1 tablet (325 mg) by mouth daily                                hydrOXYzine 25 MG tablet   Commonly known as:  ATARAX   Take 1 tablet (25 mg) by mouth every 6 hours as needed for itching (and nausea)                                oxyCODONE-acetaminophen  MG per tablet   Commonly known as:  PERCOCET   Take 1-2 tablets by mouth every 4 hours as needed for pain (moderate to severe)

## 2018-04-26 NOTE — ANESTHESIA PREPROCEDURE EVALUATION
Anesthesia Evaluation     . Pt has had prior anesthetic.     No history of anesthetic complications          ROS/MED HX    ENT/Pulmonary:     (+)sleep apnea, doesn't use CPAP , . .    Neurologic:  - neg neurologic ROS     Cardiovascular:     (+) Dyslipidemia, ----. : . . . :. . Previous cardiac testing Echodate:5/29/15results:Results     ECHO STRESS WITH OPTISON (Order [id number])      External Result Report     External Result Report     ECHO STRESS WITH OPTISON   Status: Final result   Visible to patient: Yes (MyChart) Next appt: None Dx: Shortness of breath Order: [id number]    Notes Recorded by India Reagan CMA on 2015 at 3:45 PM  Left message on patient's cell. dp  ------    Notes Recorded by Jose J Fierro DO on 2015 at 3:05 PM  Normal stress test      Details     Reading Physician Reading Date Result Priority    Marcelo Miner MD 2015        Narrative          Interpretation Summary                    [facility]  Echocardiography Laboratory  [address] [place] [address]        Name: SONYA PEPPER  MRN: [id number]  : 1965  Study Date: 2015 08:02 AM  Age: 50 yrs  Gender: Male  Patient Location: [facility]  Reason For Study: Shortness of breath  Ordering Physician: JOSE J FIERRO  Referring Physician: JOSE J FIERRO  Performed By: Isis Banks RDCS     BSA: 2.2 m2  Height: 70 in  Weight: 230 lb  HR: 73  BP: 128/82 mmHg  ______________________________________________________________________________        Procedure  Stress Echo Complete. Contrast Optison.  ______________________________________________________________________________     Interpretation Summary  The patient exercised 10 minutes.  Normal resting wall motion and no stress-induced wall motion abnormality.  No evidence of inducible ischemia on stress test.  Limited views suggest mild right ventricular dilatation with normal RV  systolic function. RVSP cannot be  estimated.  ______________________________________________________________________________     Stress  The patient exercised 10 minutes.  Normal blood pressure response to exercise.  The patient exhibited no chest pain during exercise.  Exercise was stopped due to fatigue.  The EKG portion of this stress test was negative for inducible ischemia (see  echo results below).  The visual ejection fraction is estimated at >70%.  Left ventricular cavity size decreases with exercise.  Global LV systolic function augments with exercise.  Normal resting wall motion and no stress-induced wall motion abnormality.  This was a normal stress echocardiogram.     Baseline  The patient is in normal sinus rhythm.  The visual ejection fraction is estimated at 60-65%.  No regional wall motion abnormalities noted.     Stress Results            Protocol:  MANUAL          Maximum Predicted HR:  170 bpm            Target HR: 145 bpm        % Maximum Predicted HR:  98 %     +---------+--------+----------+------+--------------------------------------+  :  Stage  :Duration:Heart Rate:  BPCom                  ment                :  :         :(mm:ss) :  (bpm)   :      :                                      :  +---------+--------+----------+------+--------------------------------------+  : Stage 1 :  3:00 11     0    :142/78:                                      :  +---------+--------+----------+------+--------------------------------------+  : Stage 2 :  3:00 12     2    :142/78:                                      :  +---------+--------+----------+------+--------------------------------------+  : Stage 3 :  3:00 14     6    :156/80:                                      :  +---------+--------+----------+------+--------------------------------------+  :  Peak   :  1:00 16     6    :164/80:Term-leg fatigue; [id number]; FAC-above:  +---------+--------+----------+------+--------------------------------------+  :Recovery :  6:00 92          :126/86:                                       :  +---------+--------+----------+------+--------------------------------------+            Stress Duration:  10:00 mm:ss *        Recovery Time: 6:00 mm:ss        Maximum Stress HR:   166 bpm *ME              TS:          16        LV  Left ventricular systolic function is normal. The visual ejection fraction is  estimated at 60-65%. No regional wall motion abnormalities noted.     RV  The right ventricle is mildly dilated. The right ventricular systolic  function is normal.     AV  The aortic valve is trileaflet. No aortic regurgitation is present. No aortic  stenosis is present.  ______________________________________________________________________________     MMode/2D Measurements & Calculations           Doppler Measurements & Calculations                 ______________________________________________________________________________     Report approved by: Cyndie Rosas 05/29/2015 10:55 AM        date: results:ECG reviewed date:1/11/18 results:SR date: results:          METS/Exercise Tolerance:  >4 METS   Hematologic:  - neg hematologic  ROS       Musculoskeletal:   (+) , , other musculoskeletal- right post. tibial tendon rupture/pain      GI/Hepatic:     (+) hepatitis liver disease,       Renal/Genitourinary:  - ROS Renal section negative       Endo:     (+) Obesity, Other Endocrine Disorder pre-diabetes.      Psychiatric:  - neg psychiatric ROS       Infectious Disease:  - neg infectious disease ROS       Malignancy:      - no malignancy   Other:    - neg other ROS                 Physical Exam  Normal systems: cardiovascular, pulmonary and dental    Airway   Mallampati: II  TM distance: >3 FB  Neck ROM: full    Dental     Cardiovascular       Pulmonary                     Anesthesia Plan      History & Physical Review  History and physical reviewed and following examination; no interval change.    ASA Status:  2 .    NPO Status:  > 8 hours    Plan for Periph. Nerve  Block for postop pain, General, LMA and ETT with Propofol and Intravenous induction. Maintenance will be Balanced.    PONV prophylaxis:  Ondansetron (or other 5HT-3) and Dexamethasone or Solumedrol       Postoperative Care  Postoperative pain management:  IV analgesics, Oral pain medications and Peripheral nerve block (Single Shot).      Consents  Anesthetic plan, risks, benefits and alternatives discussed with:  Patient and Spouse..                          .

## 2018-04-26 NOTE — OP NOTE
[facility] ORTHOPEDICS OPERATIVE REPORT  Operative Report - Orthopedics  Daniel Schultz,  1965, MRN [id number]    Surgery Date: 18    PCP: No Fierro, [phone]   Code status:  No Order       OPERATION SITE:  [facility] Operating Room       OPERATIVE REPORT  DR. BROCK FOURNIER  FOOT & ANKLE SURGEON  [facility] ORTHOPEDICS    DATE OF PROCEDURE: 18    SITE: [facility] Operating Room    SURGEON: Dr. Brock Fournier - [facility]    ASSISTANT: None      Pre-Operative Diagnosis:  1.  Right posterior tendon rupture  2.  Post previous right ankle repair procedures    Post-Operative Diagnosis:  1.  Right posterior tendon rupture  2.  Post previous right ankle repair procedures    Procedures Performed:  1.  Right posterior tibial tendon rupture repair with internal augmentation  2.  Bio-Tenodesis of the flexor digitorum longus tendon for transfer  3.  Medial deltoid ligament test and spring ligamentous repair procedures  4.  Posterior splint application below the knee ankle neutral    Anesthesia: General with local and regional  Hemostasis: Thigh tourniquet at 200 mmHg  EBL: < 10 mL  Findings: Complete disruption at the level of the ankle of the posterior tibial tendon confirming sources of pain and confirmed MRI findings.  This in the setting of prior repair.  Somewhat compromised medial ankle supportive collateral ligaments as well.  Implants: To complete the deltoid ligamentous, spring ligamentous and posterior tibial tendon repair with internal augmentation as well as a flexor digitorum longus transfer bio composite swivel lock anchors with associated collagen coated fiber tape as described in the procedural details were utilized.  Specimens: None    Indications for the Operation:  The patient has been seen and evaluated in clinic for the above-mentioned diagnoses.  They have failed to respond to nonoperative care measures and/or surgical care for  condition was indicated.  They have elected to proceed as recommended/indicated with surgical care after a thorough discussion of the associated pros, cons, risks and benefits of the operations as well as the postoperative course and details.  All associated questions were answered.  Verbal and written form informed consent was obtained.  Please see additional information within the clinical notes.    Description of the Procedure:  Patient was seen and evaluated in the preoperative holding area.  The surgical site was marked.  The consent was signed.  The H&P was updated/reviewed.  Patient was transported from the preoperative holding area to the surgical suite.  The patient was placed on the operative table.  Anesthesia was obtained.  Antibiotics were administered via IV.  Tourniquet was applied.  The operative extremity was prepped and draped sterilely.  Then, a timeout was performed to identify the proper patient, surgical site and the procedures to be performed.  Local anesthetic was infiltrated about the operative margins for regional blockade utilizing a one-to-one mixture of 2% lidocaine plain and 0.5% Marcaine plain approximately 40 cc of the mixture was utilized.  The foot/ankle was exsanguinated, and the tourniquet was inflated.    Attention was then directed to the medial margin of the ankle.  Following portion of prior incisional line incision made from the posterior proximal malleolar line distally to the level of the navicular.  This was an approximate 10-12 cm incision made with #15 blade.  This was then carefully dissected with neurovascular identification and retraction.  The posterior tibial tendon sheath was opened up and found to be with significant fluid.  The tendon itself was found to be ruptured completely and retracted to the posterior proximal level of the ankle within its sheath.  The sheath was further opened up proximally.  Distally there is found to be significant scarring and adhesions  about the distal stump making it completely difficult to discern.  This area was debrided.  Secondary to distal tendon disease the adjacent flexor digitorum longus about the patient sheath was located and harvested as distally as possible through a single site harvest.  This was then whipstitched with an interlocking interwoven stitch for Bio-Tenodesis.  The distal stump of the posterior tibial tendon was whipstitched as well this was difficult to extend as distally as possible.  Secondary to this insertional tissues were debrided.  A drill hole was placed within the navicular.  The stress test as an attachment point for the stranding to the posterior tibial tendon and the flexor digitorum longus tendon was tenodesed to this as well reinserted under inversion stress with a 4.5 bio swivel lock anchor to act us tenodesis.  This was conducted successfully.  Then further reinforce the deltoid and spring ligamentous structures additional swivel lock placed, 3.75 in size into the medial malleolar margin deep to the tendinous structures and the underlying deep to the tendon structures on the sustentaculum margin to act as an internal strut support for the medial soft tissues to take stress off the tendons dynamically.  This significantly improved medial stability and dynamic impact/support of the medial column.    Following this, thorough irrigation of the surgical sites was conducted.  Layered, anatomic closure completed with 2-0 Vicryl, 3-0 Vicryl and 3-0 nylon with careful apposition of the skin and surfaces for primary healing.  A compressive sterile splint/dressing was applied.  Vascular status was intact after deflation of the tourniquet.  SPLINT: A posterior fiberglass, below the knee splint was applied with the ankle in the neutral position.  COMPLICATIONS: No direct complications encountered throughout the case.    The patient tolerated the procedure & anesthesia well.  They were transported from the operative  suite to the postoperative holding area.  The patient was given postoperative orders as well as specific postoperative instructions which were reviewed by the nursing staff.  Orders were placed for weightbearing status/activity, postoperative oral pain management, DVT prophylaxis measures both with mechanical and medicinal measures reviewed.  Splint/dressing care measures were reviewed as well as appropriate cryotherapy measures and nutrition.  Postoperative follow-up to be conducted in the next 10-14 days for outpatient clinical follow-up in the Orthopedic clinic at [facility].  If concerns or questions arise or develop they will contact our clinic and postoperative contact numbers provided.  Case details and post-operative care requirements reviewed with family/support present today.  Additionally, a detailed postoperative instruction sheet was provided to the patient and family.  All additional questions were answered postoperatively.    Please note that this report was completed with the assistance of voice recognition and transcription services.  Although every effort has been made to correct and avoid errors, errors may remain.    Dr. Avinash Dick, MICHAEL, FACFAS  Foot & Ankle Surgeon/Specialist  [facility]          CC: [facility]

## 2018-04-26 NOTE — IP AVS SNAPSHOT
[facility]    [address] [address]    Phone:  [phone]                                       After Visit Summary   4/26/2018    Daniel Schultz    MRN: [id number]           After Visit Summary Signature Page     I have received my discharge instructions, and my questions have been answered. I have discussed any challenges I see with this plan with the nurse or doctor.    ..........................................................................................................................................  Patient/Patient Representative Signature      ..........................................................................................................................................  Patient Representative Print Name and Relationship to Patient    ..................................................               ................................................  Date                                            Time    ..........................................................................................................................................  Reviewed by Signature/Title    ...................................................              ..............................................  Date                                                            Time

## 2018-04-26 NOTE — ANESTHESIA PROCEDURE NOTES
Peripheral nerve/Neuraxial procedure note : saphenous and popliteal  Pre-Procedure  Performed by  SHAMAR CARRILLO   Location: pre-op    Procedure Times:4/26/2018 5:24 PM and 4/26/2018 5:38 PM  Pre-Anesthestic Checklist: patient identified, IV checked, site marked, risks and benefits discussed, informed consent, monitors and equipment checked, pre-op evaluation, at physician/surgeon's request and post-op pain management    Timeout  Correct Patient: Yes   Correct Procedure: Yes   Correct Site: Yes   Correct Laterality: Yes   Correct Position: Yes   Site Marked: Yes   .   Procedure Documentation    Diagnosis:PAIN.    Procedure:  right  Saphenous and popliteal.  Local skin infiltrated with mL of 1% lidocaine.     Ultrasound used to identify targeted nerve, plexus, or vascular marker and placed a needle adjacent to it., Ultrasound was used to visualize the spread of the anesthetic in close proximity to the above stated nerve. A permanent image is entered into the patient's record.  Patient Prep;mask, sterile gloves, chlorhexidine gluconate and isopropyl alcohol, patient draped.  Nerve Stim: Initial Level 1 mA.  Lowest motor response 0.44 mA..  Needle: insulated, short bevel Needle Gauge: 20.    Needle Length (Inches) 4  Insertion Method: Single Shot.       Assessment/Narrative  Paresthesias: No.  Injection made incrementally with aspirations every 5 mL..  The placement was negative for: blood aspirated, painful injection and site bleeding.  Bolus given via needle. No blood aspirated via catheter.   Secured via.   Complications: none. Test dose of 5 mL lidocaine 1.5% w/ 1:200,000 epinephrine at 17:30.  Test dose negative for signs of intravascular, subdural or intrathecal injection. Comments:  Total volume injected at Sciatic nerve:30    Total volume injected at Saphenous Nerve:10

## 2018-04-27 NOTE — ANESTHESIA CARE TRANSFER NOTE
Patient: Daniel Schultz    Procedure(s):  Right insertional posterior tibial tendon repair and tendon transfer. - Wound Class: I-Clean    Diagnosis: Right posterior tibial tendon rupture  Diagnosis Additional Information: No value filed.    Anesthesia Type:   Periph. Nerve Block for postop pain, General, LMA, ETT     Note:  Airway :Nasal Cannula  Patient transferred to:PACU  Handoff Report: Identifed the Patient, Identified the Reponsible Provider, Reviewed the pertinent medical history, Discussed the surgical course, Reviewed Intra-OP anesthesia mangement and issues during anesthesia, Set expectations for post-procedure period and Allowed opportunity for questions and acknowledgement of understanding      Vitals: (Last set prior to Anesthesia Care Transfer)    CRNA VITALS  4/26/2018 1849 - 4/26/2018 1926      4/26/2018             Pulse: 89    SpO2: 100 %    Resp Rate (observed): 15                Electronically Signed By: ESPREANZA Lowe CRNA  April 26, 2018  7:26 PM

## 2018-04-27 NOTE — DISCHARGE INSTRUCTIONS
Same Day Surgery Discharge Instructions  Special Precautions After Surgery - Adult    1. It is not unusual to feel lightheaded or faint, up to 24 hours after surgery or while taking pain medication.  If you have these symptoms; sit for a few minutes before standing and have someone assist you when getting up.  2. You should rest and relax for the next 24 hours and must have someone stay with you for at least 24 hours after your discharge.  3. DO NOT DRIVE any vehicle or operate mechanical equipment for 24 hours following the end of your surgery.  DO NOT DRIVE while taking narcotic pain medications that have been prescribed by your physician.  If you had a limb operated on, you must be able to use it fully to drive.  4. DO NOT drink alcoholic beverages for 24 hours following surgery or while taking prescription pain medication.  5. Drink clear liquids (apple juice, ginger ale, broth, 7-Up, etc.).  Progress to your regular diet as you feel able.  6. Any questions call your physician and do not make important decisions for 24 hours.    ACTIVITY  ? Per MD instruction     INCISIONAL CARE  ? Per MD instruction      Medications:  Follow instructions on bottle     Nausea and Vomiting  What are nausea and vomiting?   Nausea is the queasy feeling you usually have before you vomit. Vomiting is the forceful emptying (throwing up) of the stomach's contents through the mouth.   What causes nausea and vomiting?   Nausea and vomiting are symptoms that may occur with many conditions, such as:   Anesthesia medications   side effect of narcotic medicines  exposure to unpleasant odors or sights   stress and anxiety     How is it treated?   At first you should rest your stomach for a few hours by eating nothing solid and sipping only clear liquids. A little later you can eat soft bland foods that are easy to digest.   It is important to drink small amounts (1 to 4 ounces) often so that you do not become dehydrated.  Gradually drink larger amounts of the clear fluids. If you vomit, wait an hour, then start over with a smaller amount of fluid.   Eat slowly and avoid foods that are acidic, spicy, fatty, or fibrous (such as meats, coarse grains, and raw vegetables). Also avoid extremely hot or cold food. In addition, avoid dairy products if you have diarrhea. You may start eating your normal diet again in 3 days or so, when all signs of illness have passed.   Rest as much as possible. Sit or lie down with your head propped up. Do not lie flat for at least 2 hours after eating. Nausea and vomiting usually last only a short period of time. If you have cramping or pain in your belly you can try putting a heating pad set at low or a covered hot water bottle on your belly. Never set a heating pad on high because you could get burned.   If you have been vomiting for more than a day or have had diarrhea for over 3 days, you may need to have an exam by your provider, including a check for dehydration. If you are very dehydrated, you may need to be given fluids intravenously (IV). In children and older adults dehydration can quickly become life threatening.   When should I call my healthcare provider?   Talk with your provider if you are unable to keep fluids down for more than 12 hours or if you have any of the following symptoms with nausea and vomiting:   severe headache or neck ache, or stiff neck   severe abdominal pain   diarrhea and vomiting that last more than 24 hours   blood in the vomited material that may look red, brown, or black, or like coffee grounds   bloody diarrhea   very forceful vomiting   signs of dehydration such as dry mouth, excessive thirst, little or no urination, severe weakness, dizziness, or lightheadedness.   If you have nausea and pain in the jaw, arm, shoulder, chest, or back; sweating; shortness of breath; or lightheadedness; call 911 for emergency care.     Breakthrough Bleeding    How/Why does it occur?    There are many causes of breakthrough bleeding onto your dressing. The two most common causes are increased activity and increased NSAID use.     How is it treated?   The treatment for breakthrough dressing bleeding depends on the cause. For simple problems such as a saturated dressing, you may need to reinforce the dressing with more gauze and tape and put slight pressure on the site.  Call your healthcare provider if something else is causing bleeding.        Post-operative Infection  What is a wound infection?    watch for signs of infection. Signs that the wound/incision is infected include:   Pus or cloudy fluid draining from the incision.   A pimple or yellow crust forming on the incision   The scab is increasing in size.   Increasing redness occurs around the incisions  A red streak is spreading from the wound toward the heart.   The incision has become extremely tender and/or hot   The lymph node draining that area of skin may become large and tender.   You may develop a fever over 100?F (37.8?C).     What is the cause?   Most skin infections follow breaks in the skin (for example, surgery,  from cuts, puncture wounds, animal bites, splinters, thorns, or burns). Bacteria (especially staphylococcus or streptococcus) then invade the wound and cause the infection.    Deeper wounds (like surgical incisions) are much more likely to become infected than superficial wounds (for example, scrapes).     What is the treatment?   Call your doctor's clinic if you feel you have the beginnings of an infection   Antibiotics You will probably need antibiotics prescribed by your healthcare provider. This medicine will kill the germs that are causing the wound infection. Try not to forget any of the doses.  Even if you feel better in a few days, take the antibiotic until it is completely gone to keep the infection from flaring up again.    Fever and pain relief Take acetaminophen or ibuprofen if you develop a fever over  102?F (39?C)    How can I help prevent infections?   Wash around all new incisions vigorously with soap and water for 5 to 10 minutes to remove dirt and bacteria.      When should I call my healthcare provider?   Call IMMEDIATELY if:   The redness keeps spreading.   The wound becomes extremely painful.   Call during office hours if:   The fever is not gone 48 hours after you start taking an antibiotic.   The wound infection does not look better 3 days after your start taking an antibiotic.   The wound isn't completely healed within 10 days.   You have other questions or concerns.     __________________________________________________________________________________________________________________________________  IMPORTANT NUMBERS:    [facility] Main Number:  [phone], [phone]  Pharmacy:  [phone]  Same Day Surgery:  [phone], Monday - Friday until 8:30 p.m.  Urgent Care:  [phone]  Emergency Room:  [phone]      [facility]:  [phone]                                                                             [facility]:  [phone] option 1  [facility]:  [phone]     [facility] Clinic:  [phone]   Sur[facility] Specialty Clinic:  [phone]   Home Medical Equipment: [phone]  [facility]:  [phone]

## 2018-05-10 ENCOUNTER — TRANSFERRED RECORDS (OUTPATIENT)
Dept: HEALTH INFORMATION MANAGEMENT | Facility: CLINIC | Age: 53
End: 2018-05-10

## 2018-06-07 ENCOUNTER — TRANSFERRED RECORDS (OUTPATIENT)
Dept: HEALTH INFORMATION MANAGEMENT | Facility: CLINIC | Age: 53
End: 2018-06-07

## 2018-06-13 ENCOUNTER — HOSPITAL ENCOUNTER (OUTPATIENT)
Dept: PHYSICAL THERAPY | Facility: CLINIC | Age: 53
Setting detail: THERAPIES SERIES
End: 2018-06-13
Attending: PODIATRIST
Payer: COMMERCIAL

## 2018-06-13 PROCEDURE — 97110 THERAPEUTIC EXERCISES: CPT | Mod: GP | Performed by: PHYSICAL THERAPIST

## 2018-06-13 PROCEDURE — 97140 MANUAL THERAPY 1/> REGIONS: CPT | Mod: GP | Performed by: PHYSICAL THERAPIST

## 2018-06-13 PROCEDURE — 97161 PT EVAL LOW COMPLEX 20 MIN: CPT | Mod: GP | Performed by: PHYSICAL THERAPIST

## 2018-06-13 PROCEDURE — 40000718 ZZHC STATISTIC PT DEPARTMENT ORTHO VISIT: Performed by: PHYSICAL THERAPIST

## 2018-06-13 NOTE — ADDENDUM NOTE
Encounter addended by: Brit Pérez, PT on: 6/13/2018 11:46 AM<BR>     Actions taken: Sign clinical note, Flowsheet accepted, Episode resolved

## 2018-06-13 NOTE — ADDENDUM NOTE
Encounter addended by: Brit Pérez, PT on: 6/13/2018 11:44 AM<BR>     Actions taken: Sign clinical note, Flowsheet accepted, Episode resolved

## 2018-06-13 NOTE — PROGRESS NOTES
PHYSICAL THERAPY INITIAL EVALUATION  06/13/18 1000   General Information   Type of Visit Initial OP Ortho PT Evaluation   Start of Care Date 06/13/18   Referring Physician Dr. Dick   Patient/Family Goals Statement be able to golf    Orders Evaluate and Treat   Orders Comment 12 visits   Date of Order 06/07/18   Insurance Type Private   Insurance Comments/Visits Authorized [employer] One/no limits   Medical Diagnosis right posterior tibial tendon rupture repair   Surgical/Medical history reviewed Yes   Precautions/Limitations no known precautions/limitations   Weight-Bearing Status - RLE weight-bearing as tolerated   Presentation and Etiology   Pertinent history of current problem (include personal factors and/or comorbidities that impact the POC) Was lifting/moving furniture and heard a pop in ankle. Same ankle as previous surgery. Had repair on 4/26. Was in walking boot until yesterday.    Impairments A. Pain;B. Decreased WB tolerance;C. Swelling;D. Decreased ROM;E. Decreased flexibility;F. Decreased strength and endurance;G. Impaired balance;H. Impaired gait;K. Numbness;L. Tingling   Functional Limitations perform activities of daily living;perform desired leisure / sports activities   Symptom Location R medial aknle   How/Where did it occur Other  (post-operative)   Onset date of current episode/exacerbation 04/26/18   Chronicity New   Pain rating (0-10 point scale) Best (/10);Worst (/10)   Best (/10) 3   Worst (/10) 8   Pain quality B. Dull;C. Aching   Frequency of pain/symptoms A. Constant   Pain/symptoms exacerbated by B. Walking;C. Lifting;G. Certain positions;K. Home tasks;L. Work tasks   Pain/symptoms eased by A. Sitting;C. Rest;E. Changing positions;H. Cold;I. OTC medication(s);J. Braces/supports   Progression of symptoms since onset: Improved   Prior Level of Function   Prior Level of Function-Mobility independent   Prior Level of Function-ADLs independent   Current Level of Function   Patient  role/employment history F. Retired   Employment Comments coaches basketball    Current equipment-Gait/Locomotion None   Fall Risk Screen   Fall screen completed by PT   Have you fallen 2 or more times in the past year? No   Have you fallen and had an injury in the past year? No   Is patient a fall risk? No   Functional Scales   Other Scales  LEFS: 43/80   Ankle/Foot Objective Findings   Integumentary R 63.5 cm  L 58 cm w/figure 8. Incision medial ankle well healed, good scar mobility   Gait/Locomotion decreased toe off, decreased WS over to R LE, limps   Balance/ Proprioception (Single Leg Stance) unable to hold single leg stance on R   Right DF (Knee Ext) AROM 6   Right PF AROM 45   Right Calcanceal Inversion AROM 20   Right Calcaneal Eversion AROM 20   Left DF (Knee Ext) AROM 15   Left PF AROM 60   Left Calcanceal Inversion AROM 45   Left Calcaneal Eversion AROM 30   Right Gastroc (in WB) Flexibility R mod tight   Right Soleus (in WB) Flexibility R mod tight   Palpation incisional tenderness   Accessory Motion/Joint Mobility tight talocrural joint   Planned Therapy Interventions   Planned Therapy Interventions balance training;gait training;joint mobilization;manual therapy;neuromuscular re-education;ROM;strengthening;stretching   Clinical Impression   Criteria for Skilled Therapeutic Interventions Met yes, treatment indicated   PT Diagnosis right ankle pain, decreased ROM, decreased strength      Influenced by the following impairments right ankle pain, swelling, decreased ROM, decreased strength, impaired gait      Functional limitations due to impairments walking, stairs, running, dressing    Clinical Presentation Stable/Uncomplicated   Clinical Presentation Rationale progressing as expected without complications   Clinical Decision Making (Complexity) Low complexity   Therapy Frequency 1 time/week   Predicted Duration of Therapy Intervention (days/wks) 4-6 weeks, 1x every other week for 4 weeks   Risk &  Benefits of therapy have been explained Yes   Patient, Family & other staff in agreement with plan of care Yes   Clinical Impression Comments Patient is s/p R ankle posterior tibialis tendon repair on 4/26/18 by Dr. Dick.    Education Assessment   Preferred Learning Style Listening;Demonstration;Pictures/video   Barriers to Learning No barriers   ORTHO GOALS   PT Ortho Eval Goals 1;2;3;4   Ortho Goal 1   Goal Identifier 1   Goal Description Patient will be able to walk without a limp.    Target Date 08/08/18   Ortho Goal 2   Goal Identifier 2   Goal Description Patient will be able to ascend/descend stairs without a rail demonstrating normal gait mechanics and minimal pain or difficulty.   Target Date 08/08/18   Ortho Goal 3   Goal Identifier 3   Goal Description Patient will be able to walk 1 mile with minimal pain or difficulty.   Target Date 08/08/18   Ortho Goal 4   Goal Identifier 4   Goal Description Patient will be able to return to golf with minimal pain or difficulty.   Target Date 08/08/18   Total Evaluation Time   Total Evaluation Time 15       Please contact me with any questions or concerns.  Thank you for your referral.    Brit Pérez, PT, DPT, OCS  Physical Therapist, Orthopedic Certified Specialist  [facility]  [phone]

## 2018-06-13 NOTE — PROGRESS NOTES
Patient did not return for follow up treatments as directed.  Goal status and current objective information is therefore unknown.  The daily note from the patient's last visit will serve as the discharge note. Discharge from PT services at this time for this episode of treatment. Please see attached documentation under this episode of care for further information including dates of service, start of care date, referring physician, Dx, treatment plan, treatments, etc.    Please contact me with any questions or concerns.  Thank you for your referral.    Brit Pérez, PT, DPT, OCS  Physical Therapist, Orthopedic Certified Specialist  [facility]  [phone]       PHYSICAL THERAPY DISCHARGE NOTE  04/19/17 1333   Signing Clinician's Name / Credentials   Signing clinician's name / credentials ILDA Mann   Session Number   Session Number 8 HP   Progress Note/Recertification   Progress Note Due Date 03/29/17   Adult Goals   PT Ortho Eval Goals 1;2;3   Ortho Goal 1   Goal Identifier HEP   Goal Description Pt will be able to demonstrate proper form and duration of exercises in order to progress independence with HEP   Target Date 03/15/17   Ortho Goal 2   Goal Identifier Strength   Goal Description Pt will demonstrate atleast 4+/5 strength in order to ambulate without gait deviations.   Target Date 03/29/17   Ortho Goal 3   Goal Identifier Work, coaching   Goal Description Pt will be able to stand and walk > 1 hour in order to participate with work activities, walking and ambulating with <2/10 pain during mobility.   Target Date 03/29/17   Subjective Report   Subjective Report Pt reports no pain with daily activities, only with standing on it for prolonged periods, still a little softer under ball of foot.     Objective Measures   Objective Measures Objective Measure 1;Objective Measure 2   Objective Measure 1   Objective Measure R Ankle AROM   Details DF 10*, PF 50,  Inversion 30, Eversion 15   Treatment Interventions   Interventions Therapeutic Procedure/Exercise;Manual Therapy   Therapeutic Procedure/exercise   Minutes 15   Skilled Intervention HEP instruction   Patient Response Mild pain with eversion   Treatment Detail Reviewed ankle 4-way and progressed to GTB x 20 each direction.   Neuromuscular Re-education   Minutes 10   Skilled Intervention Balance and proprioception training   Patient Response No pain, fatigue ankle.   Treatment Detail Instructed pt in single leg stance on floor, progressed to foam marching with alternating holds 15', instructed pt in single leg balance with tubing on L foot, arcs forward and back x 10 with YTB.   Education   Learner Patient   Readiness Eager   Method Booklet/handout   Response Verbalizes Understanding   Plan   Home program As above   Plan for next session Continue band strengthening, progress balance to dynamic kiah stepping and holding, scar tissue mobs, standing step calf stretch with toe raises   Total Session Time   Total Treatment Time (sum of timed and untimed services) 25, mt nmr

## 2018-06-22 ENCOUNTER — HOSPITAL ENCOUNTER (OUTPATIENT)
Dept: PHYSICAL THERAPY | Facility: CLINIC | Age: 53
Setting detail: THERAPIES SERIES
End: 2018-06-22
Attending: PODIATRIST
Payer: COMMERCIAL

## 2018-06-22 PROCEDURE — 40000718 ZZHC STATISTIC PT DEPARTMENT ORTHO VISIT: Performed by: PHYSICAL THERAPIST

## 2018-06-22 PROCEDURE — 97110 THERAPEUTIC EXERCISES: CPT | Mod: GP | Performed by: PHYSICAL THERAPIST

## 2018-06-22 PROCEDURE — 97140 MANUAL THERAPY 1/> REGIONS: CPT | Mod: GP | Performed by: PHYSICAL THERAPIST

## 2018-07-06 ENCOUNTER — HOSPITAL ENCOUNTER (OUTPATIENT)
Dept: PHYSICAL THERAPY | Facility: CLINIC | Age: 53
Setting detail: THERAPIES SERIES
End: 2018-07-06
Attending: PODIATRIST
Payer: COMMERCIAL

## 2018-07-06 PROCEDURE — 40000718 ZZHC STATISTIC PT DEPARTMENT ORTHO VISIT: Performed by: PHYSICAL THERAPIST

## 2018-07-06 PROCEDURE — 97112 NEUROMUSCULAR REEDUCATION: CPT | Mod: GP | Performed by: PHYSICAL THERAPIST

## 2018-07-06 PROCEDURE — 97110 THERAPEUTIC EXERCISES: CPT | Mod: GP | Performed by: PHYSICAL THERAPIST

## 2018-07-11 ENCOUNTER — HOSPITAL ENCOUNTER (OUTPATIENT)
Dept: PHYSICAL THERAPY | Facility: CLINIC | Age: 53
Setting detail: THERAPIES SERIES
End: 2018-07-11
Attending: PODIATRIST
Payer: COMMERCIAL

## 2018-07-11 PROCEDURE — 97112 NEUROMUSCULAR REEDUCATION: CPT | Mod: GP | Performed by: PHYSICAL THERAPIST

## 2018-07-11 PROCEDURE — 40000718 ZZHC STATISTIC PT DEPARTMENT ORTHO VISIT: Performed by: PHYSICAL THERAPIST

## 2018-07-11 PROCEDURE — 97140 MANUAL THERAPY 1/> REGIONS: CPT | Mod: GP | Performed by: PHYSICAL THERAPIST

## 2018-07-20 ENCOUNTER — HOSPITAL ENCOUNTER (OUTPATIENT)
Dept: PHYSICAL THERAPY | Facility: CLINIC | Age: 53
Setting detail: THERAPIES SERIES
End: 2018-07-20
Attending: PODIATRIST
Payer: COMMERCIAL

## 2018-07-20 PROCEDURE — 97110 THERAPEUTIC EXERCISES: CPT | Mod: GP | Performed by: PHYSICAL THERAPIST

## 2018-07-20 PROCEDURE — 97112 NEUROMUSCULAR REEDUCATION: CPT | Mod: GP | Performed by: PHYSICAL THERAPIST

## 2018-07-20 PROCEDURE — 40000718 ZZHC STATISTIC PT DEPARTMENT ORTHO VISIT: Performed by: PHYSICAL THERAPIST

## 2018-08-03 ENCOUNTER — HOSPITAL ENCOUNTER (OUTPATIENT)
Dept: PHYSICAL THERAPY | Facility: CLINIC | Age: 53
Setting detail: THERAPIES SERIES
End: 2018-08-03
Attending: PODIATRIST
Payer: COMMERCIAL

## 2018-08-03 PROCEDURE — 97112 NEUROMUSCULAR REEDUCATION: CPT | Mod: GP | Performed by: PHYSICAL THERAPIST

## 2018-08-03 PROCEDURE — 40000718 ZZHC STATISTIC PT DEPARTMENT ORTHO VISIT: Performed by: PHYSICAL THERAPIST

## 2018-08-03 NOTE — PROGRESS NOTES
PHYSICAL THERAPY DISCHARGE NOTE  08/03/18 0800   Signing Clinician's Name / Credentials   Signing clinician's name / credentials Brit Pérez, PT, DPT, OCS   Session Number   Session Number 6 PO   Progress Note/Recertification   Progress Note Due Date 08/08/18   Adult Goals   PT Ortho Eval Goals 1;2;3;4   Ortho Goal 1   Goal Identifier 1   Goal Description Patient will be able to walk without a limp.    Target Date 08/08/18   Date Met 07/20/18   Ortho Goal 2   Goal Identifier 2   Goal Description Patient will be able to ascend/descend stairs without a rail demonstrating normal gait mechanics and minimal pain or difficulty.   Target Date 08/08/18   Date Met 08/03/18   Ortho Goal 3   Goal Identifier 3   Goal Description Patient will be able to walk 1 mile with minimal pain or difficulty.   Target Date 08/08/18   Date Met 08/03/18   Ortho Goal 4   Goal Identifier 4   Goal Description Patient will be able to return to golf with minimal pain or difficulty.   Target Date 08/08/18   Date Met 08/03/18   Subjective Report   Subjective Report Patient reports no pain in the foot or ankle. Has been golfing a lot. No longer needing to wear the ankle brace.    Objective Measures   Objective Measures Objective Measure 1;Objective Measure 2;Objective Measure 3;Objective Measure 4;Objective Measure 5   Objective Measure 1   Objective Measure R Ankle AROM   Objective Measure 2   Objective Measure R Ankle PROM   Objective Measure 3   Objective Measure WB Flexibility   Objective Measure 4   Objective Measure Single leg stance    Details R 26 seconds L 60+ seconds    Objective Measure 5   Objective Measure Palpation    Treatment Interventions   Interventions Therapeutic Procedure/Exercise;Manual Therapy   Therapeutic Procedure/exercise   Minutes 5   Skilled Intervention ROM, flexibility, strength, HEP instruction to decrease pain and improve function   Patient Response no pain   Treatment Detail prog B heel raise to single leg heel  raise 2 x 10 (now able to do).continue ankle 4 way with GTB.   Instructed pt to continue exercises until single leg heel raises on the R equal to L side and SLS on R is equal to the L.    Neuromuscular Re-education   Minutes 25   Skilled Intervention Balance and proprioception training   Patient Response SLS difficult   Treatment Detail SLS, squats on back of BOSU ball x 20, alternating lunges, SLS 2# ball toss at rebounder in forward position, SLS and toe touch 2# ball toss at rebounder in lateral positions.    Education   Learner Patient   Readiness Eager   Method Booklet/handout;Explanation;Demonstration   Response Verbalizes Understanding;Demonstrates Understanding   Plan   Home program cont focus on squats, lunges, SLS, single leg heel raise, and ankle 4 way GTB   Updates to plan of care All goals met   Plan for next session discharge, continue home exercise program   Total Session Time   Timed Code Treatment Minutes 30   Total Treatment Time (sum of timed and untimed services) 30, nmr2       Please contact me with any questions or concerns.  Thank you for your referral.    Brit Pérez, PT, DPT, OCS  Physical Therapist, Orthopedic Certified Specialist  [facility]  [phone]

## 2018-08-22 ENCOUNTER — OFFICE VISIT (OUTPATIENT)
Dept: LAB | Facility: SCHOOL | Age: 53
End: 2018-08-22
Payer: COMMERCIAL

## 2018-08-22 VITALS
OXYGEN SATURATION: 96 % | SYSTOLIC BLOOD PRESSURE: 126 MMHG | BODY MASS INDEX: 32.06 KG/M2 | TEMPERATURE: 98.2 F | DIASTOLIC BLOOD PRESSURE: 82 MMHG | WEIGHT: 229 LBS | HEIGHT: 71 IN | HEART RATE: 77 BPM

## 2018-08-22 DIAGNOSIS — Z00.00 ENCOUNTER FOR WELLNESS EXAMINATION IN ADULT: Primary | ICD-10-CM

## 2018-08-22 PROCEDURE — 99396 PREV VISIT EST AGE 40-64: CPT

## 2018-08-22 NOTE — PROGRESS NOTES
"    SUBJECTIVE:  CC: Daniel Schultz is an 53 year old woman who presents for Wellness Check at [facility] [id number].     Review of Healthy Lifestyle:    Do you get at least three servings of calcium containing foods daily (dairy, green leafy vegetables, etc.)? yes     Do you have a high-fiber diet? no     Amount of exercise or daily activities, outside of work: 0 day(s) per week    Do you wear sunscreen on a regular basis? Yes      Are you taking your medications regularly not applicable    Have you had an eye exam in the past two years? yes    Do you see a dentist twice per year? yes    Do you have sleep apnea, excessive snoring or excessive daytime drowsiness? no    Do you use tobacco in any form? no       OBJECTIVE:    Vitals: /82 (BP Location: Left arm, Patient Position: Chair, Cuff Size: Adult Regular)  Pulse 77  Temp 98.2  F (36.8  C) (Tympanic)  Ht 5' 11\" (1.803 m)  Wt 229 lb (103.9 kg)  SpO2 96%  BMI 31.94 kg/m2  BMI= Body mass index is 31.94 kg/(m^2).    HEARING: Right Ear:        500Hz:  RESPONSE- on Level: 25 db   1000 Hz: RESPONSE- on Level: 40 db   2000 Hz: RESPONSE- on Level: 25 db   4000 Hz: RESPONSE- on Level: 40 db    Left Ear:       500Hz:  RESPONSE- on Level: 25 db   1000 Hz: RESPONSE- on Level: 40 db   2000 Hz: RESPONSE- on Level: 25 db   4000 Hz: RESPONSE- on Level: 40 db    VISION:  Right eye:  20/20  Left eye:     20/20  Both eyes: 20/20  Corrective lenses?  No    Medication Reconciliation: complete    ASSESSMENT/PLAN:  (Z00.00) Encounter for wellness examination in adult  (primary encounter diagnosis)  Comment: encouraged him to see PCP for lipid screening (last done 5 years ago)  Plan: see PCP          COUNSELING:      reports that he has never smoked. He has never used smokeless tobacco.    Estimated body mass index is 31.94 kg/(m^2) as calculated from the following:    Height as of this encounter: 5' 11\" (1.803 m).    Weight as of this encounter: 229 " lb (103.9 kg).   Weight management plan: Discussed healthy diet and exercise guidelines and patient will follow up in 12 months in clinic to re-evaluate.    Counseling Resources  [employer]  [email or web address]  Britney Li M.D.    [facility] PROVIDER  [facility]

## 2018-08-22 NOTE — PATIENT INSTRUCTIONS
"                Daniel Schultz has completed a Wellness Check at the [facility] on 8/22/2018.      ____________________________________________  [place] [employer] PROVIDER                                                                               Wellness Visit Recommendations:      See your regular primary care health provider every year in order to help stay healthy.    Review health changes.     Review your medicines if your doctor has prescribed any.    Talk to your provider about how often to have your cholesterol checked.    If you are at risk for diabetes, you should have a diabetes test (fasting glucose).    Shots: Get a flu shot each year. Get a tetanus shot every 10 years.     Review with your primary care provider other immunizations that you may need based on your age and/or medical/surgical history    Nutrition:     Eat at least 5 servings of fruits and vegetables each day.    Eat whole-grain bread, whole-wheat pasta and brown rice instead of white grains and rice.    Preventive Care to be reviewed by your primary care provider:    Females:        Cervical Cancer Screening                          Breast Cancer Screening                          Colon Cancer Screening  Males:             Prostrate Cancer Screening                          Colon Cancer Screening      Lifestyle:    Exercise at least 150 minutes a week (30 minutes a day, 5 days of the week). This will help you control your weight and help prevent disease or manage disease.    Limit alcohol to one drink per day or less depending on your past medical history.    No smoking.     Wear sunscreen to prevent skin cancer.    See your dentist every six months for an exam and cleaning.    Today's Vital Signs:  /82 (BP Location: Left arm, Patient Position: Chair, Cuff Size: Adult Regular)  Pulse 77  Temp 98.2  F (36.8  C) (Tympanic)  Ht 5' 11\" (1.803 m)  Wt 229 lb (103.9 kg)  SpO2 96%  BMI 31.94 kg/m2  "

## 2018-08-22 NOTE — MR AVS SNAPSHOT
After Visit Summary   8/22/2018    Daniel Schultz    MRN: [id number]           Patient Information     Date Of Birth          1965        Visit Information        Provider Department      8/22/2018 3:30 PM Provider, [facility]        Today's Diagnoses     Encounter for wellness examination in adult    -  1      Care Instructions                    Daniel Schultz has completed a Wellness Check at the [facility] on 8/22/2018.      ____________________________________________  [facility] PROVIDER                                                                               Wellness Visit Recommendations:      See your regular primary care health provider every year in order to help stay healthy.    Review health changes.     Review your medicines if your doctor has prescribed any.    Talk to your provider about how often to have your cholesterol checked.    If you are at risk for diabetes, you should have a diabetes test (fasting glucose).    Shots: Get a flu shot each year. Get a tetanus shot every 10 years.     Review with your primary care provider other immunizations that you may need based on your age and/or medical/surgical history    Nutrition:     Eat at least 5 servings of fruits and vegetables each day.    Eat whole-grain bread, whole-wheat pasta and brown rice instead of white grains and rice.    Preventive Care to be reviewed by your primary care provider:    Females:        Cervical Cancer Screening                          Breast Cancer Screening                          Colon Cancer Screening  Males:             Prostrate Cancer Screening                          Colon Cancer Screening      Lifestyle:    Exercise at least 150 minutes a week (30 minutes a day, 5 days of the week). This will help you control your weight and help prevent disease or manage disease.    Limit alcohol to one drink per day or less depending on your  "past medical history.    No smoking.     Wear sunscreen to prevent skin cancer.    See your dentist every six months for an exam and cleaning.    Today's Vital Signs:  /82 (BP Location: Left arm, Patient Position: Chair, Cuff Size: Adult Regular)  Pulse 77  Temp 98.2  F (36.8  C) (Tympanic)  Ht 5' 11\" (1.803 m)  Wt 229 lb (103.9 kg)  SpO2 96%  BMI 31.94 kg/m2          Follow-ups after your visit        Who to contact     If you have questions or need follow up information about today's clinic visit or your schedule please contact [facility] directly at [phone].  Normal or non-critical lab and imaging results will be communicated to you by Ezuzahart, letter or phone within 4 business days after the clinic has received the results. If you do not hear from us within 7 days, please contact the clinic through [employer] or phone. If you have a critical or abnormal lab result, we will notify you by phone as soon as possible.  Submit refill requests through [employer] or call your pharmacy and they will forward the refill request to us. Please allow 3 business days for your refill to be completed.          Additional Information About Your Visit        [employer] Information     [employer] gives you secure access to your electronic health record. If you see a primary care provider, you can also send messages to your care team and make appointments. If you have questions, please call your primary care clinic.  If you do not have a primary care provider, please call [phone] and they will assist you.        Care EveryWhere ID     This is your Care EveryWhere ID. This could be used by other organizations to access your [facility] medical records  [id number]        Your Vitals Were     Pulse Temperature Height Pulse Oximetry BMI (Body Mass Index)       77 98.2  F (36.8  C) (Tympanic) 5' 11\" (1.803 m) 96% 31.94 kg/m2        Blood Pressure from Last 3 Encounters:   08/22/18 126/82   04/26/18 " 125/90   01/25/18 108/74    Weight from Last 3 Encounters:   08/22/18 229 lb (103.9 kg)   04/26/18 230 lb (104.3 kg)   01/25/18 223 lb (101.2 kg)              Today, you had the following     No orders found for display       Primary Care Provider Office Phone # Fax #    No Fierro [phone] [address] [facility] [address]        Equal Access to Services     JUVE OLSON : Hadii aad ku hadasho Soomaali, waaxda luqadaha, qaybta kaalmada adeegyada, waxay nikkiin haygenesisn yinka alexandre. So [facility] [phone].    ATENCIÓN: Si habla español, tiene a liu disposición servicios gratuitos de asistencia lingüística. Llame al [phone].    We comply with applicable federal civil rights laws and [place] laws. We do not discriminate on the basis of race, color, national origin, age, disability, sex, sexual orientation, or gender identity.            Thank you!     Thank you for choosing [place] [address]  for your care. Our goal is always to provide you with excellent care. Hearing back from our patients is one way we can continue to improve our services. Please take a few minutes to complete the written survey that you may receive in the mail after your visit with us. Thank you!             Your Updated Medication List - Protect others around you: Learn how to safely use, store and throw away your medicines at www.disposemymeds.org.      Notice  As of 8/22/2018  3:40 PM    You have not been prescribed any medications.

## 2018-08-22 NOTE — NURSING NOTE
"Initial /82 (BP Location: Left arm, Patient Position: Chair, Cuff Size: Adult Regular)  Pulse 77  Temp 98.2  F (36.8  C) (Tympanic)  Ht 5' 11\" (1.803 m)  Wt 229 lb (103.9 kg)  SpO2 96%  BMI 31.94 kg/m2 Estimated body mass index is 31.94 kg/(m^2) as calculated from the following:    Height as of this encounter: 5' 11\" (1.803 m).    Weight as of this encounter: 229 lb (103.9 kg). .    Gretta Butts CMA ([facility])  "

## 2018-08-23 ENCOUNTER — MYC MEDICAL ADVICE (OUTPATIENT)
Dept: FAMILY MEDICINE | Facility: CLINIC | Age: 53
End: 2018-08-23

## 2018-08-23 ENCOUNTER — E-VISIT (OUTPATIENT)
Dept: FAMILY MEDICINE | Facility: CLINIC | Age: 53
End: 2018-08-23
Payer: COMMERCIAL

## 2018-08-23 DIAGNOSIS — R45.84 ANHEDONIA: Primary | ICD-10-CM

## 2018-08-23 PROCEDURE — 99441 ZZC PHYSICIAN TELEPHONE EVALUATION 5-10 MIN: CPT | Performed by: INTERNAL MEDICINE

## 2018-08-23 ASSESSMENT — PATIENT HEALTH QUESTIONNAIRE - PHQ9
SUM OF ALL RESPONSES TO PHQ QUESTIONS 1-9: 18
10. IF YOU CHECKED OFF ANY PROBLEMS, HOW DIFFICULT HAVE THESE PROBLEMS MADE IT FOR YOU TO DO YOUR WORK, TAKE CARE OF THINGS AT HOME, OR GET ALONG WITH OTHER PEOPLE: VERY DIFFICULT
SUM OF ALL RESPONSES TO PHQ QUESTIONS 1-9: 18

## 2018-08-23 NOTE — MR AVS SNAPSHOT
After Visit Summary   8/23/2018    Daniel Schultz    MRN: [id number]           Patient Information     Date Of Birth          1965        Visit Information        Provider Department      8/23/2018 5:10 PM No Fierro, DO [facility]        Today's Diagnoses     Anhedonia    -  1       Follow-ups after your visit        Who to contact     If you have questions or need follow up information about today's clinic visit or your schedule please contact [facility] directly at [phone].  Normal or non-critical lab and imaging results will be communicated to you by [employer], letter or phone within 4 business days after the clinic has received the results. If you do not hear from us within 7 days, please contact the clinic through [employer] or phone. If you have a critical or abnormal lab result, we will notify you by phone as soon as possible.  Submit refill requests through [employer] or call your pharmacy and they will forward the refill request to us. Please allow 3 business days for your refill to be completed.          Additional Information About Your Visit        MyChart Information     [employer] gives you secure access to your electronic health record. If you see a primary care provider, you can also send messages to your care team and make appointments. If you have questions, please call your primary care clinic.  If you do not have a primary care provider, please call [phone] and they will assist you.        Care EveryWhere ID     This is your Care EveryWhere ID. This could be used by other organizations to access your [facility] medical records  [id number]         Blood Pressure from Last 3 Encounters:   08/22/18 126/82   04/26/18 125/90   01/25/18 108/74    Weight from Last 3 Encounters:   08/22/18 229 lb (103.9 kg)   04/26/18 230 lb (104.3 kg)   01/25/18 223 lb (101.2 kg)              Today, you had the following     No orders found for display        Primary Care Provider Office Phone # Fax #    No Fierro [phone] [address] [facility] [address]        Equal Access to Services     JUVE OLSON : Hadii aad ku hadedeo Soeleanorali, waaxda luqadaha, qaybta kaalmada adeegyada, ysabel nikkiin hayaakarey dumontjamiecullen alexandre. So [facility] [phone].    ATENCIÓN: Si habla español, tiene a liu disposición servicios gratuitos de asistencia lingüística. Llame al [phone].    We comply with applicable federal civil rights laws and [place] laws. We do not discriminate on the basis of race, color, national origin, age, disability, sex, sexual orientation, or gender identity.            Thank you!     Thank you for choosing [facility]  for your care. Our goal is always to provide you with excellent care. Hearing back from our patients is one way we can continue to improve our services. Please take a few minutes to complete the written survey that you may receive in the mail after your visit with us. Thank you!             Your Updated Medication List - Protect others around you: Learn how to safely use, store and throw away your medicines at www.disposemymeds.org.      Notice  As of 8/23/2018 11:59 PM    You have not been prescribed any medications.

## 2018-08-24 ENCOUNTER — VIRTUAL VISIT (OUTPATIENT)
Dept: FAMILY MEDICINE | Facility: CLINIC | Age: 53
End: 2018-08-24
Payer: COMMERCIAL

## 2018-08-24 DIAGNOSIS — F32.1 MODERATE SINGLE CURRENT EPISODE OF MAJOR DEPRESSIVE DISORDER (H): Primary | ICD-10-CM

## 2018-08-24 DIAGNOSIS — F41.1 GAD (GENERALIZED ANXIETY DISORDER): ICD-10-CM

## 2018-08-24 PROCEDURE — 99441 ZZC PHYSICIAN TELEPHONE EVALUATION 5-10 MIN: CPT | Performed by: INTERNAL MEDICINE

## 2018-08-24 ASSESSMENT — PATIENT HEALTH QUESTIONNAIRE - PHQ9: SUM OF ALL RESPONSES TO PHQ QUESTIONS 1-9: 18

## 2018-08-24 NOTE — PROGRESS NOTES
"  SUBJECTIVE:   Daniel Schultz is a 53 year old male who is being evaluated via a telephone visit.      The patient has been notified of following:     \"This telephone visit will be conducted via a call between you and your physician/provider. We have found that certain health care needs can be provided without the need for a physical exam.  This service lets us provide the care you need with a short phone conversation.  If a prescription is necessary we can send it directly to your pharmacy.  If lab work is needed we can place an order for that and you can then stop by our lab to have the test done at a later time.    We will bill your insurance company for this service.  Please check with your medical insurance if this type of visit is covered. You may be responsible for the cost of this type of visit if insurance coverage is denied.  The typical cost is $30 (10min), $59 (11-20min) and $85 (21-30min).  Most often these visits are shorter than 10 minutes.    If during the course of the call the physician/provider feels a telephone visit is not appropriate, you will not be charged for this service.\"       Consent has been obtained for this service by care team member: yes.   See the scanned image in the medical record.    Daniel Schultz complains of  Telephone (Depression)      I have reviewed and updated the patient's Past Medical History, Social History, Family History and Medication List.    ALLERGIES  Nkda [no known drug allergies]    Cait (MA signature)        Additional provider notes: Feeling down, low energy, 'blah' most of the days.  Mother and son have depression, both on sertraline.  He believes chronic issues with depression int he past, but worse since half-way.  Has been seeing counselor for 6+ months.  It is helpful but still struggling.  Having troubles with anxiety - poor sleep, palpitations, 'amped up'.  No panic attacks. Counselor is working on PTSD.  Wondering about zoloft since family " members on this.    Assessment/Plan:          ICD-10-CM    1. Moderate single current episode of major depressive disorder (H) F32.1 sertraline (ZOLOFT) 50 MG tablet   2. JOSE ALFREDO (generalized anxiety disorder) F41.1 sertraline (ZOLOFT) 50 MG tablet     discussed risk/benefts, side effects.  Can increase if not effective after 4 weeks.    Total time of call between patient and provider was 8 minutes       No Fierro,   [facility]

## 2018-08-24 NOTE — MR AVS SNAPSHOT
After Visit Summary   8/24/2018    Daniel Schultz    MRN: [id number]           Patient Information     Date Of Birth          1965        Visit Information        Provider Department      8/24/2018 11:40 AM No Fierro, DO [facility]        Today's Diagnoses     Moderate single current episode of major depressive disorder (H)    -  1    JOSE ALFREDO (generalized anxiety disorder)           Follow-ups after your visit        Who to contact     If you have questions or need follow up information about today's clinic visit or your schedule please contact [facility] directly at [phone].  Normal or non-critical lab and imaging results will be communicated to you by Mobile Experiencehart, letter or phone within 4 business days after the clinic has received the results. If you do not hear from us within 7 days, please contact the clinic through [employer] or phone. If you have a critical or abnormal lab result, we will notify you by phone as soon as possible.  Submit refill requests through [employer] or call your pharmacy and they will forward the refill request to us. Please allow 3 business days for your refill to be completed.          Additional Information About Your Visit        MyChart Information     [employer] gives you secure access to your electronic health record. If you see a primary care provider, you can also send messages to your care team and make appointments. If you have questions, please call your primary care clinic.  If you do not have a primary care provider, please call [phone] and they will assist you.        Care EveryWhere ID     This is your Care EveryWhere ID. This could be used by other organizations to access your [facility] medical records  [id number]         Blood Pressure from Last 3 Encounters:   08/22/18 126/82   04/26/18 125/90   01/25/18 108/74    Weight from Last 3 Encounters:   08/22/18 229 lb (103.9 kg)   04/26/18 230 lb (104.3 kg)   01/25/18 223  lb (101.2 kg)              Today, you had the following     No orders found for display         Today's Medication Changes          These changes are accurate as of 8/24/18 12:20 PM.  If you have any questions, ask your nurse or doctor.               Start taking these medicines.        Dose/Directions    sertraline 50 MG tablet   Commonly known as:  ZOLOFT   Used for:  Moderate single current episode of major depressive disorder (H), JOSE ALFREDO (generalized anxiety disorder)   Started by:  No Fierro DO        Dose:  50 mg   Take 1 tablet (50 mg) by mouth daily   Quantity:  90 tablet   Refills:  3            Where to get your medicines      These medications were sent to [facility] [place], [place] - [address]  [address] [address]     Phone:  [phone]     sertraline 50 MG tablet                Primary Care Provider Office Phone # Fax #    No Fierro -[phone] [address] [address]        Equal Access to Services     JUVE OLSON : Hadii aad ku hadasho Soomaali, waaxda luqadaha, qaybta kaalmada adeegyada, waxay idiin hayaan yinka pickering . So [facility] [phone].    ATENCIÓN: Si habla español, tiene a liu disposición servicios gratuitos de asistencia lingüística. Llame al [phone].    We comply with applicable federal civil rights laws and [place] laws. We do not discriminate on the basis of race, color, national origin, age, disability, sex, sexual orientation, or gender identity.            Thank you!     Thank you for choosing [facility]  for your care. Our goal is always to provide you with excellent care. Hearing back from our patients is one way we can continue to improve our services. Please take a few minutes to complete the written survey that you may receive in the mail after your visit with us. Thank you!             Your Updated Medication List - Protect others around you: Learn how to  safely use, store and throw away your medicines at www.disposemymeds.org.          This list is accurate as of 8/24/18 12:20 PM.  Always use your most recent med list.                   Brand Name Dispense Instructions for use Diagnosis    sertraline 50 MG tablet    ZOLOFT    90 tablet    Take 1 tablet (50 mg) by mouth daily    Moderate single current episode of major depressive disorder (H), JOSE ALFREDO (generalized anxiety disorder)

## 2018-08-24 NOTE — TELEPHONE ENCOUNTER
Contacted patient at home, [phone], no answer, left voice message to call back.  Attempted to contact patient on cell, [phone], no answer, left voice message to call back.  Clinic Station  bishop to deliver message below.  schedule telephone visit in a hold or over lunch or last visit of the day         Provider could speak to patient within the next 15 minutes as well, until about 0930.

## 2018-08-25 ASSESSMENT — PATIENT HEALTH QUESTIONNAIRE - PHQ9: SUM OF ALL RESPONSES TO PHQ QUESTIONS 1-9: 14

## 2018-11-30 ENCOUNTER — TRANSFERRED RECORDS (OUTPATIENT)
Dept: HEALTH INFORMATION MANAGEMENT | Facility: CLINIC | Age: 53
End: 2018-11-30

## 2018-12-06 ENCOUNTER — TRANSFERRED RECORDS (OUTPATIENT)
Dept: HEALTH INFORMATION MANAGEMENT | Facility: CLINIC | Age: 53
End: 2018-12-06

## 2018-12-06 NOTE — BRIEF OP NOTE
[facility] OR   Brief Operative Note    Pre-operative diagnosis: Right posterior tibial tendon rupture   Post-operative diagnosis * No post-op diagnosis entered *   Procedure: Procedure(s):  Right insertional posterior tibial tendon repair and tendon transfer choice anes with popliteal block - Wound Class: I-Clean   Surgeon: Avinash Dick DPM   Anesthesia: Other    Estimated blood loss: Less than 10 ml   Blood transfusion: No transfusion was given during surgery   Drains: None   Specimens: None   Findings:  Complete disruption at the level of the ankle of the posterior tibial tendon confirming sources of pain and confirmed MRI findings.  This in the setting of prior repair.  Somewhat compromised medial ankle supportive collateral ligaments as well.   Complications: None   Condition: Stable   Comments: See dictated operative report for full details.           Avinash Dick DPM, FACFAS  Foot & Ankle Surgeon/Specialist  [facility]     Sir, you will have to amend your note, I am not able to change Dx in your note  The note is what they use to approve Rx

## 2019-01-08 ENCOUNTER — OFFICE VISIT (OUTPATIENT)
Dept: FAMILY MEDICINE | Facility: CLINIC | Age: 54
End: 2019-01-08
Payer: COMMERCIAL

## 2019-01-08 VITALS
OXYGEN SATURATION: 97 % | TEMPERATURE: 97.4 F | BODY MASS INDEX: 32.87 KG/M2 | WEIGHT: 229.6 LBS | HEIGHT: 70 IN | HEART RATE: 93 BPM | SYSTOLIC BLOOD PRESSURE: 132 MMHG | DIASTOLIC BLOOD PRESSURE: 92 MMHG

## 2019-01-08 DIAGNOSIS — G47.33 OSA (OBSTRUCTIVE SLEEP APNEA): ICD-10-CM

## 2019-01-08 DIAGNOSIS — S46.212D RUPTURE OF LEFT BICEPS TENDON, SUBSEQUENT ENCOUNTER: ICD-10-CM

## 2019-01-08 DIAGNOSIS — K75.81 NONALCOHOLIC STEATOHEPATITIS (NASH): ICD-10-CM

## 2019-01-08 DIAGNOSIS — F32.1 MODERATE SINGLE CURRENT EPISODE OF MAJOR DEPRESSIVE DISORDER (H): ICD-10-CM

## 2019-01-08 DIAGNOSIS — R73.03 PRE-DIABETES: ICD-10-CM

## 2019-01-08 DIAGNOSIS — F41.1 GAD (GENERALIZED ANXIETY DISORDER): ICD-10-CM

## 2019-01-08 DIAGNOSIS — Z23 NEED FOR PROPHYLACTIC VACCINATION AND INOCULATION AGAINST INFLUENZA: ICD-10-CM

## 2019-01-08 DIAGNOSIS — Z01.818 PREOP GENERAL PHYSICAL EXAM: Primary | ICD-10-CM

## 2019-01-08 DIAGNOSIS — E78.5 HYPERLIPIDEMIA LDL GOAL <160: ICD-10-CM

## 2019-01-08 LAB
ALBUMIN SERPL-MCNC: 3.7 G/DL (ref 3.4–5)
ALP SERPL-CCNC: 65 U/L (ref 40–150)
ALT SERPL W P-5'-P-CCNC: 57 U/L (ref 0–70)
ANION GAP SERPL CALCULATED.3IONS-SCNC: 8 MMOL/L (ref 3–14)
AST SERPL W P-5'-P-CCNC: 34 U/L (ref 0–45)
BILIRUB DIRECT SERPL-MCNC: <0.1 MG/DL (ref 0–0.2)
BILIRUB SERPL-MCNC: 0.4 MG/DL (ref 0.2–1.3)
BUN SERPL-MCNC: 11 MG/DL (ref 7–30)
CALCIUM SERPL-MCNC: 7.6 MG/DL (ref 8.5–10.1)
CHLORIDE SERPL-SCNC: 105 MMOL/L (ref 94–109)
CO2 SERPL-SCNC: 28 MMOL/L (ref 20–32)
CREAT SERPL-MCNC: 1 MG/DL (ref 0.66–1.25)
GFR SERPL CREATININE-BSD FRML MDRD: 85 ML/MIN/{1.73_M2}
GLUCOSE SERPL-MCNC: 98 MG/DL (ref 70–99)
POTASSIUM SERPL-SCNC: 3.6 MMOL/L (ref 3.4–5.3)
PROT SERPL-MCNC: 7.3 G/DL (ref 6.8–8.8)
SODIUM SERPL-SCNC: 141 MMOL/L (ref 133–144)

## 2019-01-08 PROCEDURE — 90471 IMMUNIZATION ADMIN: CPT | Performed by: INTERNAL MEDICINE

## 2019-01-08 PROCEDURE — 93000 ELECTROCARDIOGRAM COMPLETE: CPT | Performed by: INTERNAL MEDICINE

## 2019-01-08 PROCEDURE — 36415 COLL VENOUS BLD VENIPUNCTURE: CPT | Performed by: INTERNAL MEDICINE

## 2019-01-08 PROCEDURE — 80048 BASIC METABOLIC PNL TOTAL CA: CPT | Performed by: INTERNAL MEDICINE

## 2019-01-08 PROCEDURE — 80076 HEPATIC FUNCTION PANEL: CPT | Performed by: INTERNAL MEDICINE

## 2019-01-08 PROCEDURE — 90682 RIV4 VACC RECOMBINANT DNA IM: CPT | Performed by: INTERNAL MEDICINE

## 2019-01-08 PROCEDURE — 99215 OFFICE O/P EST HI 40 MIN: CPT | Mod: 25 | Performed by: INTERNAL MEDICINE

## 2019-01-08 RX ORDER — SERTRALINE HYDROCHLORIDE 100 MG/1
100 TABLET, FILM COATED ORAL DAILY
Qty: 90 TABLET | Refills: 3 | Status: SHIPPED | OUTPATIENT
Start: 2019-01-08 | End: 2019-09-17

## 2019-01-08 ASSESSMENT — MIFFLIN-ST. JEOR: SCORE: 1884.77

## 2019-01-08 NOTE — PATIENT INSTRUCTIONS
Before Your Surgery      Call your surgeon if there is any change in your health. This includes signs of a cold or flu (such as a sore throat, runny nose, cough, rash or fever).    Do not smoke, drink alcohol or take over the counter medicine (unless your surgeon or primary care doctor tells you to) for the 24 hours before and after surgery.    If you take prescribed drugs: Follow your doctor s orders about which medicines to take and which to stop until after surgery.    Eating and drinking prior to surgery: follow the instructions from your surgeon    Take a shower or bath the night before surgery. Use the soap your surgeon gave you to gently clean your skin. If you do not have soap from your surgeon, use your regular soap. Do not shave or scrub the surgery site.  Wear clean pajamas and have clean sheets on your bed.     1. Increase sertraline to 100 mg - taking TWO 50 mg pills.  2. Lab and EKG today.

## 2019-01-08 NOTE — LETTER
January 9, 2019      Daniel Schultz  [address] [address]        Dear ,    We are writing to inform you of your test results.  Kidney, liver function, blood sugar, electrolytes are normal.    Resulted Orders   Basic metabolic panel   Result Value Ref Range    Sodium 141 133 - 144 mmol/L    Potassium 3.6 3.4 - 5.3 mmol/L    Chloride 105 94 - 109 mmol/L    Carbon Dioxide 28 20 - 32 mmol/L    Anion Gap 8 3 - 14 mmol/L    Glucose 98 70 - 99 mg/dL    Urea Nitrogen 11 7 - 30 mg/dL    Creatinine 1.00 0.66 - 1.25 mg/dL    GFR Estimate 85 >60 mL/min/[1.73_m2]      Comment:      Non  GFR Calc  Starting 12/18/2018, serum creatinine based estimated GFR (eGFR) will be   calculated using the Chronic Kidney Disease Epidemiology Collaboration   (CKD-EPI) equation.      GFR Estimate If Black >90 >60 mL/min/[1.73_m2]      Comment:       GFR Calc  Starting 12/18/2018, serum creatinine based estimated GFR (eGFR) will be   calculated using the Chronic Kidney Disease Epidemiology Collaboration   (CKD-EPI) equation.      Calcium 7.6 (L) 8.5 - 10.1 mg/dL   Hepatic panel (Albumin, ALT, AST, Bili, Alk Phos, TP)   Result Value Ref Range    Bilirubin Direct <0.1 0.0 - 0.2 mg/dL    Bilirubin Total 0.4 0.2 - 1.3 mg/dL    Albumin 3.7 3.4 - 5.0 g/dL    Protein Total 7.3 6.8 - 8.8 g/dL    Alkaline Phosphatase 65 40 - 150 U/L    ALT 57 0 - 70 U/L    AST 34 0 - 45 U/L       If you have any questions or concerns, please call the clinic at the number listed above.       Sincerely,        No Fierro, DO/cheng

## 2019-01-08 NOTE — PROGRESS NOTES
[facility]  [address] [address]  [phone]  Dept: [phone]    PRE-OP EVALUATION:  Today's date: 2019    Daniel Schultz (: 1965) presents for pre-operative evaluation assessment as requested by Dr. Haney  He requires evaluation and anesthesia risk assessment prior to undergoing surgery/procedure for treatment of torn left bicep .    Proposed Surgery/ Procedure: bicep tendon repair  Date of Surgery/ Procedure: 19   Time of Surgery/ Procedure: TBD   Hospital/Surgical Facility: [facility] in [place]   [phone]   Primary Physician: No Fierro  Type of Anesthesia Anticipated: to be determined    Patient has a Health Care Directive or Living Will:  NO    1. NO - Do you have a history of heart attack, stroke, stent, bypass or surgery on an artery in the head, neck, heart or legs?  2. NO - Do you ever have any pain or discomfort in your chest?  3. NO - Do you have a history of  Heart Failure?  4. NO - Are you troubled by shortness of breath when: walking on the level, up a slight hill or at night?  5. NO - Do you currently have a cold, bronchitis or other respiratory infection?  6. NO - Do you have a cough, shortness of breath or wheezing?  7. NO - Do you sometimes get pains in the calves of your legs when you walk?  8. NO - Do you or anyone in your family have previous history of blood clots?  9. NO - Do you or does anyone in your family have a serious bleeding problem such as prolonged bleeding following surgeries or cuts?  10. NO - Have you ever had problems with anemia or been told to take iron pills?  11. NO - Have you had any abnormal blood loss such as black, tarry or bloody stools, or abnormal vaginal bleeding?  12. NO - Have you ever had a blood transfusion?  13. NO - Have you or any of your relatives ever had problems with anesthesia?  14. NO - Do you have sleep apnea, excessive snoring or daytime drowsiness?  15.  NO - Do you have any prosthetic heart valves?  16. NO - Do you have prosthetic joints?  17. NO - Is there any chance that you may be pregnant?      HPI:     HPI related to upcoming procedure: Torn bicep tendon    Severe JAZMIN: Not treated.  He has lost 20 pounds since evaluation    Depression: Not currently under control.    See problem list for active medical problems.  Problems all longstanding and stable, except as noted/documented.  See ROS for pertinent symptoms related to these conditions.                                                                                                                                                          .    MEDICAL HISTORY:     Patient Active Problem List    Diagnosis Date Noted     JAZMIN (obstructive sleep apnea) 08/18/2015     Priority: Medium     Severe JAZMIN   - HST on 8/17/2015 with weight 240 lbs, AHI 34, chris desat 86%, strong positional component.  Events labeled as central suspected to be post-arousal and feel it is most consistent with JAZMIN       Pre-diabetes 07/07/2015     Priority: Medium     Nonalcoholic steatohepatitis (W[place]KINSON) 05/26/2015     Priority: Medium     Fatty liver seen on US.  Had mild transaminitis       Meniscus tear 09/25/2013     Priority: Medium     Hyperlipidemia LDL goal <160 12/21/2010     Priority: Medium      No past medical history on file.  Past Surgical History:   Procedure Laterality Date     ARTHROSCOPY ANKLE, OPEN REPAIR LIGAMENT, COMBINED Right 1/5/2017    Procedure: COMBINED ARTHROSCOPY ANKLE, OPEN REPAIR LIGAMENT;  Surgeon: Avinash Dick DPM;  Location: [facility] OR     ARTHROSCOPY KNEE IRRIGATION AND DEBRIDEMENT  9/27/2013    Procedure: ARTHROSCOPY KNEE IRRIGATION AND DEBRIDEMENT;;  Surgeon: Ley, Jeffrey Duane, MD;  Location: [facility] OR     ARTHROSCOPY KNEE WITH MEDIAL MENISCECTOMY  9/27/2013    Procedure: ARTHROSCOPY KNEE WITH MEDIAL MENISCECTOMY;  Right Knee Arthroscopy Partial Menisectomy and Debridement,Left Knee Arthroscopy Debridement  "and Loose Body Removal--needs assist.;  Surgeon: Ley, Jeffrey Duane, MD;  Location: [facility] OR     COLONOSCOPY N/A 8/17/2016    Procedure: COLONOSCOPY;  Surgeon: Rudolph Nazario MD;  Location: [facility] GI     HEMORRHOIDECTOMY  2000    Internal and external hemorrhoidectomy     REPAIR TENDON ANKLE Right 1/25/2018    Procedure: REPAIR TENDON ANKLE;  Right Ankle Posterior Tibial Tendon Repair,Peroneus Brevis Repair, Medial Ligament Repair;  Surgeon: Avinash Dick DPM;  Location: [facility] OR     REPAIR TENDON FOOT Right 4/26/2018    Procedure: REPAIR TENDON FOOT;  Right insertional posterior tibial tendon repair and tendon transfer.;  Surgeon: Avinash Dick DPM;  Location: [facility] OR     SURGICAL HISTORY OF -   6/2001    Left Achilles tendon repair     Current Outpatient Medications   Medication Sig Dispense Refill     sertraline (ZOLOFT) 50 MG tablet Take 1 tablet (50 mg) by mouth daily 90 tablet 3     OTC products: none    Allergies   Allergen Reactions     Nkda [No Known Drug Allergies]       Latex Allergy: NO    Social History     Tobacco Use     Smoking status: Never Smoker     Smokeless tobacco: Never Used   Substance Use Topics     Alcohol use: Yes     Alcohol/week: 0.0 oz     Comment: Occasional     History   Drug Use No       REVIEW OF SYSTEMS:   CONSTITUTIONAL: NEGATIVE for fever, chills, change in weight  ENT/MOUTH: NEGATIVE for ear, mouth and throat problems  RESP: NEGATIVE for significant cough or SOB  CV: NEGATIVE for chest pain, palpitations or peripheral edema    EXAM:   BP (!) 132/92 (BP Location: Right arm, Patient Position: Sitting, Cuff Size: Adult Large)   Pulse 93   Temp 97.4  F (36.3  C) (Tympanic)   Ht 1.765 m (5' 9.5\")   Wt 104.1 kg (229 lb 9.6 oz)   SpO2 97%   BMI 33.42 kg/m    GENERAL APPEARANCE: healthy, alert and no distress  HENT: nose and mouth without ulcers or lesions  RESP: lungs clear to auscultation - no rales, rhonchi or wheezes  CV: regular rate and rhythm, normal S1 S2, no S3 " or S4 and no murmur, click or rub   ABDOMEN: soft, nontender, no HSM or masses and bowel sounds normal  NEURO: Normal strength and tone, sensory exam grossly normal, mentation intact and speech normal    DIAGNOSTICS:     EKG: appears normal, NSR, normal axis, normal intervals, no acute ST/T changes c/w ischemia, no LVH by voltage criteria, unchanged from previous tracings  Labs Drawn and in Process:   Unresulted Labs Ordered in the Past 30 Days of this Admission     Date and Time Order Name Status Description    1/8/2019 1535 HEPATIC PANEL In process     1/8/2019 1534 BASIC METABOLIC PANEL In process           Recent Labs   Lab Test 01/11/18  0925 06/08/17  0928 05/21/15  1004 05/16/15  1012   HGB 15.5  --   --  15.5     --   --  216   NA  --   --  140 141   POTASSIUM  --   --  4.1 3.6   CR 0.98  --  1.12 1.04   A1C  --  5.8 6.1*  --         IMPRESSION:   Reason for surgery/procedure: Bicep tendon tear  Diagnosis/reason for consult: Preop evaluation    The proposed surgical procedure is considered INTERMEDIATE risk.    REVISED CARDIAC RISK INDEX  The patient has the following serious cardiovascular risks for perioperative complications such as (MI, PE, VFib and 3  AV Block):  No serious cardiac risks  INTERPRETATION: 0 risks: Class I (very low risk - 0.4% complication rate)    The patient has the following additional risks for perioperative complications:  No identified additional risks    No diagnosis found.    RECOMMENDATIONS:       Cardiovascular Risk  Performs 4 METs exercise without symptoms (Light housework (dusting, washing dishes) and Climb a flight of stairs) .       Obstructive Sleep Apnea (or suspected sleep apnea)  Hospital staff are advised to monitor for sleep related oxygen desaturations due to JAZMIN      --Patient is to take all scheduled medications on the day of surgery EXCEPT for modifications listed below.    APPROVAL GIVEN to proceed with proposed procedure, without further diagnostic  evaluation       Signed Electronically by: No Fierro DO    Copy of this evaluation report is provided to requesting physician.    [place] Preop Guidelines    Revised Cardiac Risk IndexInjectable Influenza Immunization Documentation    1.  Is the person to be vaccinated sick today?   No    2. Does the person to be vaccinated have an allergy to a component   of the vaccine?   No  Egg Allergy Algorithm Link    3. Has the person to be vaccinated ever had a serious reaction   to influenza vaccine in the past?   No    4. Has the person to be vaccinated ever had Guillain-Barré syndrome?   No    Form completed by Luda Garcia

## 2019-01-22 ENCOUNTER — TRANSFERRED RECORDS (OUTPATIENT)
Dept: HEALTH INFORMATION MANAGEMENT | Facility: CLINIC | Age: 54
End: 2019-01-22

## 2019-02-26 ENCOUNTER — TRANSFERRED RECORDS (OUTPATIENT)
Dept: HEALTH INFORMATION MANAGEMENT | Facility: CLINIC | Age: 54
End: 2019-02-26

## 2019-02-26 PROBLEM — F41.1 GAD (GENERALIZED ANXIETY DISORDER): Status: ACTIVE | Noted: 2019-02-26

## 2019-04-09 ENCOUNTER — TRANSFERRED RECORDS (OUTPATIENT)
Dept: HEALTH INFORMATION MANAGEMENT | Facility: CLINIC | Age: 54
End: 2019-04-09

## 2019-06-04 ENCOUNTER — TRANSFERRED RECORDS (OUTPATIENT)
Dept: HEALTH INFORMATION MANAGEMENT | Facility: CLINIC | Age: 54
End: 2019-06-04

## 2019-08-06 ENCOUNTER — TELEPHONE (OUTPATIENT)
Dept: INTERNAL MEDICINE | Facility: CLINIC | Age: 54
End: 2019-08-06

## 2019-08-06 NOTE — TELEPHONE ENCOUNTER
Panel Management Review      Patient has the following on his problem list:     Depression / Dysthymia review    Measure:  Needs PHQ-9 score of 4 or less during index window.  Administer PHQ-9 and if score is 5 or more, send encounter to provider for next steps.    5 - 7 month window range: 6/24/19 to 10/22/19    PHQ-9 SCORE 8/23/2018 8/24/2018   PHQ-9 Total Score MyChart 18 (Moderately severe depression) -   PHQ-9 Total Score 18 14       If PHQ-9 recheck is 5 or more, route to provider for next steps.    Patient is due for:  PHQ9      Action needed:   Patient needs to do PHQ9.    Type of outreach:    Sent [employer] message.    Questions for provider review:    None                                                                                                                                    Cait Colmenares CMA (AAMA)   (aka: Lavinia Colmenares)       Chart routed to Care Team .

## 2019-08-13 NOTE — TELEPHONE ENCOUNTER
(1st attempt) Left a voice msg for pt to call back.  Whenever he does transfer to the care team to perform PHQ9.  Cait Colmenares CMA (MARY ANN)   (aka: Lavinia Colmenares)

## 2019-08-29 NOTE — TELEPHONE ENCOUNTER
(1st attempt) Left a voice msg for pt to call back.  Whenever he calls back schedule PHONE VISIT for depression follow up.  Cait Colmenares CMA ([facility])   (aka: Lavinia Colmenares)

## 2019-08-29 NOTE — TELEPHONE ENCOUNTER
Pt has answer the question through My-Chart and score 14 on the PHQ9, with zero on the question 9.  Route to the PCP as protocol.  Cait Colmenares CMA ([facility])   (aka: Lavinia Colmenares)

## 2019-08-29 NOTE — TELEPHONE ENCOUNTER
If patient feels his mental health is not well controlled he can schedule a telephone visit with me and follow-up

## 2019-09-17 ENCOUNTER — OFFICE VISIT (OUTPATIENT)
Dept: FAMILY MEDICINE | Facility: CLINIC | Age: 54
End: 2019-09-17
Payer: COMMERCIAL

## 2019-09-17 VITALS
OXYGEN SATURATION: 96 % | WEIGHT: 223 LBS | SYSTOLIC BLOOD PRESSURE: 120 MMHG | TEMPERATURE: 97.5 F | BODY MASS INDEX: 32.46 KG/M2 | DIASTOLIC BLOOD PRESSURE: 76 MMHG | RESPIRATION RATE: 14 BRPM | HEART RATE: 81 BPM

## 2019-09-17 DIAGNOSIS — Z13.6 CARDIOVASCULAR SCREENING; LDL GOAL LESS THAN 160: ICD-10-CM

## 2019-09-17 DIAGNOSIS — Z23 NEED FOR PROPHYLACTIC VACCINATION AND INOCULATION AGAINST INFLUENZA: ICD-10-CM

## 2019-09-17 DIAGNOSIS — F32.1 MODERATE SINGLE CURRENT EPISODE OF MAJOR DEPRESSIVE DISORDER (H): Primary | ICD-10-CM

## 2019-09-17 DIAGNOSIS — F41.1 GAD (GENERALIZED ANXIETY DISORDER): ICD-10-CM

## 2019-09-17 PROCEDURE — 99214 OFFICE O/P EST MOD 30 MIN: CPT | Mod: 25 | Performed by: INTERNAL MEDICINE

## 2019-09-17 PROCEDURE — 90471 IMMUNIZATION ADMIN: CPT | Performed by: INTERNAL MEDICINE

## 2019-09-17 PROCEDURE — 90682 RIV4 VACC RECOMBINANT DNA IM: CPT | Performed by: INTERNAL MEDICINE

## 2019-09-17 NOTE — PATIENT INSTRUCTIONS
1. Increase Zoloft to 150 mg once daily.  2. Follow-up in 4-6 weeks, telephone or clinic visit, if medication is not helpful.  Next to try would be Lexapro  3. Check fasting labs anytime.

## 2019-09-17 NOTE — LETTER
My Depression Action Plan  Name: Daniel Schultz   Date of Birth 1965  Date: 9/17/2019    My doctor: No Fierro   My clinic: [facility]  [address] [address] [phone]          GREEN    ZONE   Good Control    What it looks like:     Things are going generally well. You have normal up s and down s. You may even feel depressed from time to time, but bad moods usually last less than a day.   What you need to do:  1. Continue to care for yourself (see self care plan)  2. Check your depression survival kit and update it as needed  3. Follow your physician s recommendations including any medication.  4. Do not stop taking medication unless you consult with your physician first.           YELLOW         ZONE Getting Worse    What it looks like:     Depression is starting to interfere with your life.     It may be hard to get out of bed; you may be starting to isolate yourself from others.    Symptoms of depression are starting to last most all day and this has happened for several days.     You may have suicidal thoughts but they are not constant.   What you need to do:     1. Call your care team, your response to treatment will improve if you keep your care team informed of your progress. Yellow periods are signs an adjustment may need to be made.     2. Continue your self-care, even if you have to fake it!    3. Talk to someone in your support network    4. Open up your depression survival kit           RED    ZONE Medical Alert - Get Help    What it looks like:     Depression is seriously interfering with your life.     You may experience these or other symptoms: You can t get out of bed most days, can t work or engage in other necessary activities, you have trouble taking care of basic hygiene, or basic responsibilities, thoughts of suicide or death that will not go away, self-injurious behavior.     What you need to do:  1. Call your care team and  request a same-day appointment. If they are not available (weekends or after hours) call your local crisis line, emergency room or 911.            Depression Self Care Plan / Survival Kit    Self-Care for Depression  Here s the deal. Your body and mind are really not as separate as most people think.  What you do and think affects how you feel and how you feel influences what you do and think. This means if you do things that people who feel good do, it will help you feel better.  Sometimes this is all it takes.  There is also a place for medication and therapy depending on how severe your depression is, so be sure to consult with your medical provider and/ or Behavioral Health Consultant if your symptoms are worsening or not improving.     In order to better manage my stress, I will:    Exercise  Get some form of exercise, every day. This will help reduce pain and release endorphins, the  feel good  chemicals in your brain. This is almost as good as taking antidepressants!  This is not the same as joining a gym and then never going! (they count on that by the way ) It can be as simple as just going for a walk or doing some gardening, anything that will get you moving.      Hygiene   Maintain good hygiene (Get out of bed in the morning, Make your bed, Brush your teeth, Take a shower, and Get dressed like you were going to work, even if you are unemployed).  If your clothes don't fit try to get ones that do.    Diet  I will strive to eat foods that are good for me, drink plenty of water, and avoid excessive sugar, caffeine, alcohol, and other mood-altering substances.  Some foods that are helpful in depression are: complex carbohydrates, B vitamins, flaxseed, fish or fish oil, fresh fruits and vegetables.    Psychotherapy  I agree to participate in Individual Therapy (if recommended).    Medication  If prescribed medications, I agree to take them.  Missing doses can result in serious side effects.  I understand that  drinking alcohol, or other illicit drug use, may cause potential side effects.  I will not stop my medication abruptly without first discussing it with my provider.    Staying Connected With Others  I will stay in touch with my friends, family members, and my primary care provider/team.    Use your imagination  Be creative.  We all have a creative side; it doesn t matter if it s oil painting, sand castles, or mud pies! This will also kick up the endorphins.    Witness Beauty  (AKA stop and smell the roses) Take a look outside, even in mid-winter. Notice colors, textures. Watch the squirrels and birds.     Service to others  Be of service to others.  There is always someone else in need.  By helping others we can  get out of ourselves  and remember the really important things.  This also provides opportunities for practicing all the other parts of the program.    Humor  Laugh and be silly!  Adjust your TV habits for less news and crime-drama and more comedy.    Control your stress  Try breathing deep, massage therapy, biofeedback, and meditation. Find time to relax each day.     My support system    Clinic Contact:  Phone number:    Contact 1:  Phone number:    Contact 2:  Phone number:    Shinto/:  Phone number:    Therapist:  Phone number:    Local crisis center:    Phone number:    Other community support:  Phone number:

## 2019-09-17 NOTE — PROGRESS NOTES
Subjective     Daniel Schultz is a 54 year old male who presents to clinic today for the following health issues:    HPI   Depression Followup    How are you doing with your depression since your last visit? No change    Are you having other symptoms that might be associated with depression? No    Have you had a significant life event?  No     Are you feeling anxious or having panic attacks?   Yes:  Sometimes    Do you have any concerns with your use of alcohol or other drugs? No    Social History     Tobacco Use     Smoking status: Never Smoker     Smokeless tobacco: Never Used   Substance Use Topics     Alcohol use: Yes     Alcohol/week: 0.0 oz     Comment: Occasional     Drug use: No     PHQ 8/23/2018 8/24/2018 8/29/2019   PHQ-9 Total Score 18 14 12   Q9: Thoughts of better off dead/self-harm past 2 weeks Not at all Not at all Not at all     No flowsheet data found.  No flowsheet data found.  No flowsheet data found.     --not finishing projects, not enjoying time with friends/fam   --still going to counselor every other week and this is going well.  --not sleeping well at night.  Getting 5 interrupted hours of sleep. Normally does not have problems with insomnia.  Is able to fall asleep but has troubles staying asleep.  Has used melatonin but was not helpful.    In the past two weeks have you had thoughts of suicide or self-harm?  No.    Do you have concerns about your personal safety or the safety of others?   No    Suicide Assessment Five-step Evaluation and Treatment (SAFE-T)      Current Outpatient Medications   Medication Sig Dispense Refill     sertraline (ZOLOFT) 100 MG tablet Take 1 tablet (100 mg) by mouth daily 90 tablet 3         Reviewed and updated as needed this visit by Provider         Review of Systems   ROS COMP: Constitutional, HEENT, cardiovascular, pulmonary, gi and gu systems are negative, except as otherwise noted.      Objective    /76   Pulse 81   Temp 97.5  F (36.4  C)  (Tympanic)   Resp 14   Wt 101.2 kg (223 lb)   SpO2 96%   BMI 32.46 kg/m    Body mass index is 32.46 kg/m .  Physical Exam   GENERAL APPEARANCE: healthy, alert and no distress  PSYCH: mentation appears normal and affect flat and guarded          Assessment & Plan     1. Moderate single current episode of major depressive disorder (H) -uncontrolled. Significant life stressors. increase from 100 to 150.  Follow-up telephone or clinic visit 4-6 weeks.    - sertraline (ZOLOFT) 50 MG tablet; Take 3 tablets (150 mg) by mouth daily  Dispense: 270 tablet; Refill: 3  - DEPRESSION ACTION PLAN (DAP)    2. JOSE ALFREDO (generalized anxiety disorder)  - sertraline (ZOLOFT) 50 MG tablet; Take 3 tablets (150 mg) by mouth daily  Dispense: 270 tablet; Refill: 3    3. CARDIOVASCULAR SCREENING; LDL GOAL LESS THAN 160  - Lipid panel reflex to direct LDL Fasting; Future    4. Need for prophylactic vaccination and inoculation against influenza  - INFLUENZA QUAD, RECOMBINANT, P-FREE (RIV4) (FLUBLOCK) [08977]  - Vaccine Administration, Initial [93940]       Patient Instructions   1. Increase Zoloft to 150 mg once daily.  2. Follow-up in 4-6 weeks, telephone or clinic visit, if medication is not helpful.  Next to try would be Lexapro  3. Check fasting labs anytime.      No follow-ups on file.    No Fierro,   [facility]

## 2019-09-27 ENCOUNTER — OFFICE VISIT (OUTPATIENT)
Dept: LAB | Facility: SCHOOL | Age: 54
End: 2019-09-27
Payer: COMMERCIAL

## 2019-09-27 VITALS
OXYGEN SATURATION: 97 % | HEART RATE: 70 BPM | RESPIRATION RATE: 14 BRPM | WEIGHT: 224 LBS | DIASTOLIC BLOOD PRESSURE: 80 MMHG | HEIGHT: 70 IN | TEMPERATURE: 97.4 F | SYSTOLIC BLOOD PRESSURE: 124 MMHG | BODY MASS INDEX: 32.07 KG/M2

## 2019-09-27 DIAGNOSIS — Z00.00 ENCOUNTER FOR WELLNESS EXAMINATION: Primary | ICD-10-CM

## 2019-09-27 PROCEDURE — 99213 OFFICE O/P EST LOW 20 MIN: CPT

## 2019-09-27 ASSESSMENT — MIFFLIN-ST. JEOR: SCORE: 1854.37

## 2019-09-27 NOTE — NURSING NOTE
"Initial /80 (BP Location: Right arm, Patient Position: Sitting, Cuff Size: Adult Regular)   Pulse 70   Temp 97.4  F (36.3  C) (Tympanic)   Resp 14   Ht 1.765 m (5' 9.5\")   Wt 101.6 kg (224 lb)   SpO2 97%   BMI 32.60 kg/m   Estimated body mass index is 32.6 kg/m  as calculated from the following:    Height as of this encounter: 1.765 m (5' 9.5\").    Weight as of this encounter: 101.6 kg (224 lb). .    Rebeca Santamaria on 9/27/2019 at 7:55 AM    "

## 2019-09-27 NOTE — PROGRESS NOTES
"  SUBJECTIVE:  CC: Daniel Schultz is an 54 year old woman who presents for Wellness Check at [id number].     Review of Healthy Lifestyle:    Do you get at least three servings of calcium containing foods daily (dairy, green leafy vegetables, etc.)? yes     Do you have a high-fiber diet? yes     Amount of exercise or daily activities, outside of work: 2 day(s) per week    Do you wear sunscreen on a regular basis? Yes SPF 30     Are you taking your medications regularly Yes Zoloft    Have you had an eye exam in the past two years? yes    Do you see a dentist twice per year? no    Do you have sleep apnea, excessive snoring or excessive daytime drowsiness? no    Do you use tobacco in any form? no       OBJECTIVE:    Vitals: /80 (BP Location: Right arm, Patient Position: Sitting, Cuff Size: Adult Regular)   Pulse 70   Temp 97.4  F (36.3  C) (Tympanic)   Resp 14   Ht 1.765 m (5' 9.5\")   Wt 101.6 kg (224 lb)   SpO2 97%   BMI 32.60 kg/m    BMI= Body mass index is 32.6 kg/m .    HEARING: Right Ear:        500Hz:  RESPONSE- on Level:   20 db    1000 Hz: RESPONSE- on Level: 40 db   2000 Hz: RESPONSE- on Level:   20 db    4000 Hz: RESPONSE- on Level:   20 db     Left Ear:       500Hz:  RESPONSE- on Level:   20 db    1000 Hz: RESPONSE- on Level: 40 db   2000 Hz: RESPONSE- on Level:   20 db    4000 Hz: RESPONSE- on Level:   20 db     Medication Reconciliation: complete    ASSESSMENT/PLAN:    (Z00.00) Encounter for wellness examination  (primary encounter diagnosis)  Comment:    Plan:          COUNSELING:      reports that he has never smoked. He has never used smokeless tobacco.    Estimated body mass index is 32.46 kg/m  as calculated from the following:    Height as of 1/8/19: 1.765 m (5' 9.5\").    Weight as of 9/17/19: 101.2 kg (223 lb).   Weight management plan: Patient was referred to their PCP to discuss a diet and exercise plan.    Counseling Resources  [place]'s " My[employer]ate  https://www.ShapeUp.com/    [place] SCHO[employer] PROVIDER  [facility]

## 2019-09-27 NOTE — PATIENT INSTRUCTIONS
"                Daniel Schultz has completed a Wellness Check at the [facility] on 9/27/2019.      ____________________________________________  [place] [employer]CHO[employer] PROVIDER                                                                               Wellness Visit Recommendations:      See your regular primary care health provider every year in order to help stay healthy.    Review health changes.     Review your medicines if your doctor has prescribed any.    Talk to your provider about how often to have your cholesterol checked.    If you are at risk for diabetes, you should have a diabetes test (fasting glucose).    Shots: Get a flu shot each year. Get a tetanus shot every 10 years.     Review with your primary care provider other immunizations that you may need based on your age and/or medical/surgical history    Nutrition:     Eat at least 5 servings of fruits and vegetables each day.    Eat whole-grain bread, whole-wheat pasta and brown rice instead of white grains and rice.    Preventive Care to be reviewed by your primary care provider:    Females:        Cervical Cancer Screening                          Breast Cancer Screening                          Colon Cancer Screening  Males:             Prostrate Cancer Screening                          Colon Cancer Screening      Lifestyle:    Exercise at least 150 minutes a week (30 minutes a day, 5 days of the week). This will help you control your weight and help prevent disease or manage disease.    Limit alcohol to one drink per day or less depending on your past medical history.    No smoking.     Wear sunscreen to prevent skin cancer.    See your dentist every six months for an exam and cleaning.    Today's Vital Signs:  /80 (BP Location: Right arm, Patient Position: Sitting, Cuff Size: Adult Regular)   Pulse 70   Temp 97.4  F (36.3  C) (Tympanic)   Resp 14   Ht 1.765 m (5' 9.5\")   Wt 101.6 kg (224 lb)   SpO2 97%   BMI 32.60 " kg/m

## 2019-10-07 ENCOUNTER — TRANSFERRED RECORDS (OUTPATIENT)
Dept: HEALTH INFORMATION MANAGEMENT | Facility: CLINIC | Age: 54
End: 2019-10-07

## 2019-10-07 ENCOUNTER — E-VISIT (OUTPATIENT)
Dept: FAMILY MEDICINE | Facility: CLINIC | Age: 54
End: 2019-10-07
Payer: COMMERCIAL

## 2019-10-07 DIAGNOSIS — F41.1 GAD (GENERALIZED ANXIETY DISORDER): Primary | ICD-10-CM

## 2019-10-07 PROCEDURE — 99207 ZZC NO BILLABLE SERVICE THIS VISIT: CPT | Performed by: INTERNAL MEDICINE

## 2019-10-09 ENCOUNTER — HOSPITAL ENCOUNTER (EMERGENCY)
Facility: CLINIC | Age: 54
Discharge: HOME | End: 2019-10-09
Attending: EMERGENCY MEDICINE | Admitting: EMERGENCY MEDICINE
Payer: COMMERCIAL

## 2019-10-09 ENCOUNTER — TELEPHONE (OUTPATIENT)
Dept: INTERNAL MEDICINE | Facility: CLINIC | Age: 54
End: 2019-10-09

## 2019-10-09 ENCOUNTER — APPOINTMENT (OUTPATIENT)
Dept: CT IMAGING | Facility: CLINIC | Age: 54
End: 2019-10-09
Attending: EMERGENCY MEDICINE
Payer: COMMERCIAL

## 2019-10-09 ENCOUNTER — APPOINTMENT (OUTPATIENT)
Dept: GENERAL RADIOLOGY | Facility: CLINIC | Age: 54
End: 2019-10-09
Attending: EMERGENCY MEDICINE
Payer: COMMERCIAL

## 2019-10-09 VITALS
WEIGHT: 213 LBS | BODY MASS INDEX: 29.82 KG/M2 | SYSTOLIC BLOOD PRESSURE: 142 MMHG | TEMPERATURE: 98.1 F | HEIGHT: 71 IN | DIASTOLIC BLOOD PRESSURE: 92 MMHG | OXYGEN SATURATION: 97 %

## 2019-10-09 DIAGNOSIS — M54.6 ACUTE LEFT-SIDED THORACIC BACK PAIN: ICD-10-CM

## 2019-10-09 DIAGNOSIS — R10.32 LLQ ABDOMINAL PAIN: ICD-10-CM

## 2019-10-09 LAB
ALBUMIN SERPL-MCNC: 3.9 G/DL (ref 3.4–5)
ALBUMIN UR-MCNC: NEGATIVE MG/DL
ALP SERPL-CCNC: 64 U/L (ref 40–150)
ALT SERPL W P-5'-P-CCNC: 46 U/L (ref 0–70)
ANION GAP SERPL CALCULATED.3IONS-SCNC: 7 MMOL/L (ref 3–14)
APPEARANCE UR: CLEAR
AST SERPL W P-5'-P-CCNC: 20 U/L (ref 0–45)
BASOPHILS # BLD AUTO: 0.1 10E9/L (ref 0–0.2)
BASOPHILS NFR BLD AUTO: 0.8 %
BILIRUB SERPL-MCNC: 0.4 MG/DL (ref 0.2–1.3)
BILIRUB UR QL STRIP: NEGATIVE
BUN SERPL-MCNC: 15 MG/DL (ref 7–30)
CALCIUM SERPL-MCNC: 8.7 MG/DL (ref 8.5–10.1)
CHLORIDE SERPL-SCNC: 108 MMOL/L (ref 94–109)
CO2 SERPL-SCNC: 26 MMOL/L (ref 20–32)
COLOR UR AUTO: YELLOW
CREAT SERPL-MCNC: 0.93 MG/DL (ref 0.66–1.25)
DIFFERENTIAL METHOD BLD: NORMAL
EOSINOPHIL # BLD AUTO: 0.3 10E9/L (ref 0–0.7)
EOSINOPHIL NFR BLD AUTO: 4.4 %
ERYTHROCYTE [DISTWIDTH] IN BLOOD BY AUTOMATED COUNT: 12.3 % (ref 10–15)
GFR SERPL CREATININE-BSD FRML MDRD: >90 ML/MIN/{1.73_M2}
GLUCOSE SERPL-MCNC: 76 MG/DL (ref 70–99)
GLUCOSE UR STRIP-MCNC: NEGATIVE MG/DL
HCT VFR BLD AUTO: 43 % (ref 40–53)
HGB BLD-MCNC: 14.7 G/DL (ref 13.3–17.7)
HGB UR QL STRIP: NEGATIVE
IMM GRANULOCYTES # BLD: 0 10E9/L (ref 0–0.4)
IMM GRANULOCYTES NFR BLD: 0.3 %
KETONES UR STRIP-MCNC: NEGATIVE MG/DL
LEUKOCYTE ESTERASE UR QL STRIP: ABNORMAL
LIPASE SERPL-CCNC: 99 U/L (ref 73–393)
LYMPHOCYTES # BLD AUTO: 2.1 10E9/L (ref 0.8–5.3)
LYMPHOCYTES NFR BLD AUTO: 28.8 %
MCH RBC QN AUTO: 30.6 PG (ref 26.5–33)
MCHC RBC AUTO-ENTMCNC: 34.2 G/DL (ref 31.5–36.5)
MCV RBC AUTO: 89 FL (ref 78–100)
MONOCYTES # BLD AUTO: 0.8 10E9/L (ref 0–1.3)
MONOCYTES NFR BLD AUTO: 10.2 %
MUCOUS THREADS #/AREA URNS LPF: PRESENT /LPF
NEUTROPHILS # BLD AUTO: 4.1 10E9/L (ref 1.6–8.3)
NEUTROPHILS NFR BLD AUTO: 55.5 %
NITRATE UR QL: NEGATIVE
NRBC # BLD AUTO: 0 10*3/UL
NRBC BLD AUTO-RTO: 0 /100
PH UR STRIP: 6 PH (ref 5–7)
PLATELET # BLD AUTO: 204 10E9/L (ref 150–450)
POTASSIUM SERPL-SCNC: 3.7 MMOL/L (ref 3.4–5.3)
PROT SERPL-MCNC: 7.4 G/DL (ref 6.8–8.8)
RBC # BLD AUTO: 4.81 10E12/L (ref 4.4–5.9)
RBC #/AREA URNS AUTO: 1 /HPF (ref 0–2)
SODIUM SERPL-SCNC: 141 MMOL/L (ref 133–144)
SOURCE: ABNORMAL
SP GR UR STRIP: 1.04 (ref 1–1.03)
SQUAMOUS #/AREA URNS AUTO: 1 /HPF (ref 0–1)
TROPONIN I SERPL-MCNC: <0.015 UG/L (ref 0–0.04)
UROBILINOGEN UR STRIP-MCNC: 0 MG/DL (ref 0–2)
WBC # BLD AUTO: 7.4 10E9/L (ref 4–11)
WBC #/AREA URNS AUTO: 6 /HPF (ref 0–5)

## 2019-10-09 PROCEDURE — 25000125 ZZHC RX 250: Performed by: EMERGENCY MEDICINE

## 2019-10-09 PROCEDURE — 93010 ELECTROCARDIOGRAM REPORT: CPT | Mod: Z6 | Performed by: EMERGENCY MEDICINE

## 2019-10-09 PROCEDURE — 74177 CT ABD & PELVIS W/CONTRAST: CPT

## 2019-10-09 PROCEDURE — 99285 EMERGENCY DEPT VISIT HI MDM: CPT | Mod: 25 | Performed by: EMERGENCY MEDICINE

## 2019-10-09 PROCEDURE — 80053 COMPREHEN METABOLIC PANEL: CPT | Performed by: EMERGENCY MEDICINE

## 2019-10-09 PROCEDURE — 83690 ASSAY OF LIPASE: CPT | Performed by: EMERGENCY MEDICINE

## 2019-10-09 PROCEDURE — 25000128 H RX IP 250 OP 636: Performed by: EMERGENCY MEDICINE

## 2019-10-09 PROCEDURE — 85025 COMPLETE CBC W/AUTO DIFF WBC: CPT | Performed by: EMERGENCY MEDICINE

## 2019-10-09 PROCEDURE — 93005 ELECTROCARDIOGRAM TRACING: CPT | Performed by: EMERGENCY MEDICINE

## 2019-10-09 PROCEDURE — 84484 ASSAY OF TROPONIN QUANT: CPT | Performed by: EMERGENCY MEDICINE

## 2019-10-09 PROCEDURE — 71045 X-RAY EXAM CHEST 1 VIEW: CPT

## 2019-10-09 PROCEDURE — 81001 URINALYSIS AUTO W/SCOPE: CPT | Performed by: EMERGENCY MEDICINE

## 2019-10-09 RX ORDER — IOPAMIDOL 755 MG/ML
100 INJECTION, SOLUTION INTRAVASCULAR ONCE
Status: COMPLETED | OUTPATIENT
Start: 2019-10-09 | End: 2019-10-09

## 2019-10-09 RX ADMIN — IOPAMIDOL 100 ML: 755 INJECTION, SOLUTION INTRAVENOUS at 19:20

## 2019-10-09 RX ADMIN — SODIUM CHLORIDE 66 ML: 9 INJECTION, SOLUTION INTRAVENOUS at 19:20

## 2019-10-09 ASSESSMENT — MIFFLIN-ST. JEOR: SCORE: 1828.29

## 2019-10-09 NOTE — ED AVS SNAPSHOT
[facility]  [address] [address]  Phone:  [phone]  Fax:  [phone]                                    Daniel Schultz   MRN: [id number]    Department:  [facility]   Date of Visit:  10/9/2019           After Visit Summary Signature Page    I have received my discharge instructions, and my questions have been answered. I have discussed any challenges I see with this plan with the nurse or doctor.    ..........................................................................................................................................  Patient/Patient Representative Signature      ..........................................................................................................................................  Patient Representative Print Name and Relationship to Patient    ..................................................               ................................................  Date                                   Time    ..........................................................................................................................................  Reviewed by Signature/Title    ...................................................              ..............................................  Date                                               Time          22EPIC Rev 08/18

## 2019-10-09 NOTE — ED NOTES
Pt presents to ED for complaint of LLQ abd pain for a week, worse today. Pt also complains of left shoulder pain. Both are tender to palpation. Pt denies n/v/d. NO CP or SOA. No prior surgeries.

## 2019-10-09 NOTE — ED PROVIDER NOTES
History     Chief Complaint   Patient presents with     Flank Pain     radiating to left shoulder     HPI  Daniel Schultz is a 54 year old male who presents the emergency department complaining of left lower quadrant abdominal pain left flank pain and left upper back pain.  Patient states he has had mild left lower quadrant pain over the past 5 or 6 days.  Today it is worsened and he feels some pain in his flank and also in his left shoulder blade region.  He is not had any falls recently. he denies any nausea he has had loose stools but states he has just increased his antidepression medication and thinks it is secondary to this.  He denies any headache or visual changes he has not had any chest pain or shortness of breath.  He denies any nausea the pain in his LLQ is rated a 4/10. He has not had any urinary symptoms.  He denies any bowel or bladder dysfunction.  He has not had a rash.    Allergies:  Allergies   Allergen Reactions     Nkda [No Known Drug Allergies]        Problem List:    Patient Active Problem List    Diagnosis Date Noted     JOSE ALFREDO (generalized anxiety disorder) 02/26/2019     Priority: Medium     Moderate single current episode of major depressive disorder (H) 01/08/2019     Priority: Medium     JAZMIN (obstructive sleep apnea) 08/18/2015     Priority: Medium     Severe JAZMIN   - HST on 8/17/2015 with weight 240 lbs, AHI 34, chris desat 86%, strong positional component.  Events labeled as central suspected to be post-arousal and feel it is most consistent with JAZMIN  He does not wear CPAP - 1/8/2019         Pre-diabetes 07/07/2015     Priority: Medium     Nonalcoholic steatohepatitis ([place]KINSON) 05/26/2015     Priority: Medium     Fatty liver seen on US.  Had mild transaminitis       Meniscus tear 09/25/2013     Priority: Medium     Hyperlipidemia LDL goal <160 12/21/2010     Priority: Medium        Past Medical History:    No past medical history on file.    Past Surgical History:    Past Surgical  History:   Procedure Laterality Date     ARTHROSCOPY ANKLE, OPEN REPAIR LIGAMENT, COMBINED Right 1/5/2017    Procedure: COMBINED ARTHROSCOPY ANKLE, OPEN REPAIR LIGAMENT;  Surgeon: Avinash Dick DPM;  Location: [facility]     ARTHROSCOPY KNEE IRRIGATION AND DEBRIDEMENT  9/27/2013    Procedure: ARTHROSCOPY KNEE IRRIGATION AND DEBRIDEMENT;;  Surgeon: Ley, Jeffrey Duane, MD;  Location: [facility]     ARTHROSCOPY KNEE WITH MEDIAL MENISCECTOMY  9/27/2013    Procedure: ARTHROSCOPY KNEE WITH MEDIAL MENISCECTOMY;  Right Knee Arthroscopy Partial Menisectomy and Debridement,Left Knee Arthroscopy Debridement and Loose Body Removal--needs assist.;  Surgeon: Ley, Jeffrey Duane, MD;  Location: [facility]     COLONOSCOPY N/A 8/17/2016    Procedure: COLONOSCOPY;  Surgeon: Rudolph Nazario MD;  Location: [facility]     HEMORRHOIDECTOMY  2000    Internal and external hemorrhoidectomy     REPAIR TENDON ANKLE Right 1/25/2018    Procedure: REPAIR TENDON ANKLE;  Right Ankle Posterior Tibial Tendon Repair,Peroneus Brevis Repair, Medial Ligament Repair;  Surgeon: Avinash Dick DPM;  Location: [facility] OR     REPAIR TENDON FOOT Right 4/26/2018    Procedure: REPAIR TENDON FOOT;  Right insertional posterior tibial tendon repair and tendon transfer.;  Surgeon: Avinash Dick DPM;  Location: [facility] OR     SURGICAL HISTORY OF -   6/2001    Left Achilles tendon repair       Family History:    Family History   Problem Relation Age of Onset     Aneurysm Mother      Depression Father        Social History:  Marital Status:   [2]  Social History     Tobacco Use     Smoking status: Never Smoker     Smokeless tobacco: Never Used   Substance Use Topics     Alcohol use: Yes     Alcohol/week: 0.0 standard drinks     Comment: Occasional     Drug use: No        Medications:    sertraline (ZOLOFT) 50 MG tablet          Review of Systems  All systems reviewed and other than pertinent positives and negatives in HPI all other systems are negative.  Physical  "Exam   BP: (!) 142/92  Heart Rate: 71  Temp: 98.1  F (36.7  C)  Height: 180.3 cm (5' 11\")  Weight: 96.6 kg (213 lb)  SpO2: 97 %      Physical Exam  Vitals signs and nursing note reviewed.   Constitutional:       General: He is not in acute distress.     Appearance: Normal appearance. He is normal weight. He is not ill-appearing, toxic-appearing or diaphoretic.   HENT:      Head: Normocephalic.      Nose: Nose normal. No congestion.      Mouth/Throat:      Mouth: Mucous membranes are moist.      Pharynx: Oropharynx is clear.   Eyes:      Conjunctiva/sclera: Conjunctivae normal.   Neck:      Musculoskeletal: Normal range of motion and neck supple.   Cardiovascular:      Rate and Rhythm: Normal rate and regular rhythm.      Heart sounds: Normal heart sounds. No murmur.   Pulmonary:      Effort: Pulmonary effort is normal.      Breath sounds: Normal breath sounds. No wheezing, rhonchi or rales.   Chest:      Chest wall: No tenderness.   Abdominal:      General: Abdomen is flat.      Palpations: Abdomen is soft.      Comments: Mild tenderness to palpation the left lower quadrant.  There is no guarding there is no rebound bowel sounds are positive.   Musculoskeletal: Normal range of motion.      Comments: Tenderness to palpation of the left medial scapula.  This is in the superior aspect and palpation reproduces patient's back pain.  There is no shoulder pain.  There is no midline back pain.  Patient does have mild flank pain and some mild lateral left lower lumbar back tenderness.   Skin:     Capillary Refill: Capillary refill takes less than 2 seconds.   Neurological:      General: No focal deficit present.      Mental Status: He is alert. Mental status is at baseline.   Psychiatric:         Mood and Affect: Mood normal.         ED Course        Procedures               EKG Interpretation:      Interpreted by Reuben Covarrubias MD  Rhythm: normal sinus   Rate: normal  Axis: normal  Ectopy: none  Conduction: normal  ST " Segments/ T Waves: No ST-T wave changes  Q Waves: none  Comparison to prior: Unchanged from 1/8/19    Clinical Impression: normal EKG    Critical Care time:  none               Results for orders placed or performed during the hospital encounter of 10/09/19 (from the past 24 hour(s))   CBC with platelets differential   Result Value Ref Range    WBC 7.4 4.0 - 11.0 10e9/L    RBC Count 4.81 4.4 - 5.9 10e12/L    Hemoglobin 14.7 13.3 - 17.7 g/dL    Hematocrit 43.0 40.0 - 53.0 %    MCV 89 78 - 100 fl    MCH 30.6 26.5 - 33.0 pg    MCHC 34.2 31.5 - 36.5 g/dL    RDW 12.3 10.0 - 15.0 %    Platelet Count 204 150 - 450 10e9/L    Diff Method Automated Method     % Neutrophils 55.5 %    % Lymphocytes 28.8 %    % Monocytes 10.2 %    % Eosinophils 4.4 %    % Basophils 0.8 %    % Immature Granulocytes 0.3 %    Nucleated RBCs 0 0 /100    Absolute Neutrophil 4.1 1.6 - 8.3 10e9/L    Absolute Lymphocytes 2.1 0.8 - 5.3 10e9/L    Absolute Monocytes 0.8 0.0 - 1.3 10e9/L    Absolute Eosinophils 0.3 0.0 - 0.7 10e9/L    Absolute Basophils 0.1 0.0 - 0.2 10e9/L    Abs Immature Granulocytes 0.0 0 - 0.4 10e9/L    Absolute Nucleated RBC 0.0        Medications   iopamidol (ISOVUE-370) solution 100 mL (100 mLs Intravenous Given 10/9/19 1920)   sodium chloride 0.9 % bag 500mL for CT scan flush use (66 mLs As instructed Given 10/9/19 1920)     Results for orders placed or performed during the hospital encounter of 10/09/19   CT Abdomen Pelvis w Contrast    Narrative    CT ABDOMEN PELVIS WITH CONTRAST   10/9/2019 7:29 PM     HISTORY: Left lower quadrant pain.    TECHNIQUE: 100 mL Isovue 370 IV were administered. After contrast  administration, volumetric helical sections were acquired from the  lung bases to the ischial tuberosities. Coronal images were also  reconstructed. Radiation dose for this scan was reduced using  automated exposure control, adjustment of the mA and/or kV according  to patient size, or iterative reconstruction  technique.    COMPARISON: None.     FINDINGS: Scattered colonic diverticulosis without convincing evidence  for diverticulitis. No bowel obstruction. Unremarkable appendix. No  free fluid in the pelvis. The liver, gallbladder, spleen, adrenal  glands, pancreas, and kidneys are unremarkable. No hydronephrosis. No  intra-abdominal fluid collections. No free intraperitoneal air. The  visualized lung bases are clear. Degenerative changes are noted in the  visualized thoracolumbar spine.      Impression    IMPRESSION: No acute abnormality in the abdomen or pelvis. No cause  for left lower quadrant pain is identified.    KAT STAFFORD MD   XR Chest 1 View    Narrative    CHEST ONE VIEW  10/9/2019 8:22 PM     HISTORY:  Left shoulder pain.    COMPARISON: None.      Impression    IMPRESSION: Negative chest. Lungs clear.    KAT STAFFORD MD       Assessments & Plan (with Medical Decision Making) records were reviewed.  Labs were obtained.  Patient white count 7.4.  Hemoglobin 14.7.  Platelet count 204.  There is no left shift.  Comprehensive metabolic panel was unremarkable.  Urine analysis with trace leukocyte esterase 6 WBCs.  Lipase was within normal limits.  Troponin was less than 0.015.  Patient was given IV fluids.  Findings were discussed in detail with patient due to his pain a CT scan of the abdomen pelvis was obtained.  This revealed no evidence of acute abnormality.  A chest x-ray was also obtained this was unremarkable.  The chest x-ray was obtained secondary to patient's left shoulder pain.  Findings were discussed in detail with patient no obvious source of his symptoms are present at this time.  He will take ibuprofen or Tylenol for pain drink plenty of fluids and return if symptoms worsen or new symptoms develop.    Addendum: At completion of this dictation on 10/10 I had thought a culture had been sent but no cultures been sent.  With patient's urinary symptoms I feel with a few white cells small  leukocyte esterase we will treat the patient for possible UTI with Bactrim.  I discussed this with the patient who is having about the same symptoms.  He will start the Bactrim that was called in forearm and return if symptoms worsen or new symptoms develop.  Patient is agreement this plan.     I have reviewed the nursing notes.    I have reviewed the findings, diagnosis, plan and need for follow up with the patient.       Discharge Medication List as of 10/9/2019  8:51 PM          Final diagnoses:   Acute left-sided thoracic back pain   LLQ abdominal pain       10/9/2019   [facility] EMERGENCY DEPARTMENT     Reuben Covarrubias MD  10/10/19 1828

## 2019-10-09 NOTE — TELEPHONE ENCOUNTER
When he puts his chin to his chest it is worse. He has had this for weeks but worse today. It started dull. He describes as belly and back and left shoulder pain. Advised er now. He agreed.  Lise Hinton RN

## 2019-10-09 NOTE — TELEPHONE ENCOUNTER
Not sure if this is a high priority (red Flag or not) because of the left shoulder.  Appointment confirmed.   ----- Message -----   From: Daniel Schultz   Sent: 10/8/2019   6:17 PM CDT   To: Tre Pierre  Reception Pool   Subject: Appointment scheduled from [facility]                 Appointment For: Daniel Schultz ([id number])   Visit Type: [facility] OFFICE VISIT LONG (907)      10/24/2019   9:00 AM  20 mins.  No Fierro DO [facility]      Patient Comments:   Having pain in lower Left Quadrant both front and Back, and up Left Shoulder.

## 2019-10-10 NOTE — DISCHARGE INSTRUCTIONS
Return if symptoms worsen or new symptoms develop.  Follow-up with primary care next available.  Drink plenty of fluids.  Take ibuprofen or Tylenol for pain no evidence of acute intra-abdominal abnormality was noted chest x-ray was unremarkable.  If increased abdominal pain any shortness of breath or other symptoms present please return for recheck.

## 2019-10-16 ENCOUNTER — MYC MEDICAL ADVICE (OUTPATIENT)
Dept: FAMILY MEDICINE | Facility: CLINIC | Age: 54
End: 2019-10-16

## 2019-10-16 NOTE — TELEPHONE ENCOUNTER
Patient denies: fever, vomiting, black/blood stools, chest pain, severe difficulty breathing, radiating pain, dental issues, ear pain, signs/symptoms of infection.     Has appointment scheduled. Discussed going to ED/UC if worsening symptoms. Patient states agreement.      Anita GIVENS BSN, RN

## 2019-10-23 NOTE — PROGRESS NOTES
Subjective     Daniel Schultz is a 54 year old male who presents to clinic today for the following health issues:    HPI   ED/UC Followup:    Facility:  [facility]  Date of visit: 10/9/19  Reason for visit: Acute left-sided thoracic back pain + flank Pain  Current Status: Still having pain ( 3/10) dull pain  Given bactrim for possible UTI.       ABDOMINAL and FLANK   PAIN     Onset: Follow up E.D, onset was end of Sept    Description:   Character: Dull ache  Location: left flank  Radiation: Back    Intensity: mild    Progression of Symptoms:  same and constant    Accompanying Signs & Symptoms:  Fever/Chills?: no   Gas/Bloating: no   Nausea: no   Vomitting: no   Diarrhea?: no   Constipation:no   Dysuria or Hematuria: no    History:   Trauma: no   Previous similar pain: YES- ED   Previous tests done: x-ray and CT    Precipitating factors:   Does the pain change with:     Food: no      BM: no     Urination: no     Alleviating factors:heat - not helpful.  Has not tried any over the counter meds. Laying flat helps some.    Therapies Tried and outcome: na    LMP:  not applicable       --thinks the back is causing all the pain.  The pain worsens with neck flexion - chin to chest.   --pain is constant  --antibiotic did not change symptoms.  --pain has not improved since ER, but not worsened either.     Current Outpatient Medications   Medication Sig Dispense Refill     sertraline (ZOLOFT) 50 MG tablet Take 3 tablets (150 mg) by mouth daily 270 tablet 3         Reviewed and updated as needed this visit by Provider         Review of Systems   ROS COMP: Constitutional, HEENT, cardiovascular, pulmonary, GI, , musculoskeletal, neuro, skin, endocrine and psych systems are negative, except as otherwise noted.      Objective    /80   Pulse 77   Temp 96.7  F (35.9  C) (Tympanic)   Resp 12   Wt 101.2 kg (223 lb)   SpO2 95%   BMI 31.10 kg/m    Body mass index is 31.1 kg/m .  Physical Exam   GENERAL APPEARANCE:  healthy, alert and no distress  RESP: lungs clear to auscultation - no rales, rhonchi or wheezes  CV: regular rates and rhythm, normal S1 S2, no S3 or S4 and no murmur, click or rub  ABDOMEN: soft, mild LUQ pain without hepatosplenomegaly or masses and bowel sounds normal  Comprehensive back pain exam:  Tenderness of left paralumbar muscles, Pain limits the following motions: changing positions, Lower extremity strength functional and equal on both sides, Lower extremity reflexes within normal limits bilaterally, Lower extremity sensation normal and equal on both sides and Straight leg raise negative bilaterally          Assessment & Plan     1. Acute left-sided low back pain without sciatica -it is unusual that he would have left upper quadrant pain with no other GI symptoms.  Could be referred pain?  Reassuring that CT scan and lab work was normal.  Advised to try to treat as muscular with muscle relaxer as below.  If symptoms are not improving over the next week or 2, certainly if worsening, recommend follow-up back in clinic  - cyclobenzaprine (FLEXERIL) 5 MG tablet; Take 1-2 tablets (5-10 mg) by mouth 3 times daily as needed for muscle spasms  Dispense: 30 tablet; Refill: 0       1. Seems to be muscular.  However, if muscle relaxer is not helping and pain persists over the next few days-week or two, follow-up in clinic  2. Flexeril 5-10 mg up to 3 x day.  Watch for drowsiness  3. Start Aleve 1 pill twice daily x 1 week.  Take with food.  Drink plenty of water  4. Gentle stretches  5. Try heat or ice  6. Try topicals such as Aspercream with Lidocaine, Capsaicin, Icy Hot, Biofreeze, Salon Pas  7. Follow-up sooner if new symptoms develop (fever, diarrhea, etc), or pain worsens.    Return in about 2 weeks (around 11/7/2019) for If symptoms don't improve or if they worsen.    No Fierro, DO  [facility]

## 2019-10-24 ENCOUNTER — OFFICE VISIT (OUTPATIENT)
Dept: FAMILY MEDICINE | Facility: CLINIC | Age: 54
End: 2019-10-24
Payer: COMMERCIAL

## 2019-10-24 VITALS
SYSTOLIC BLOOD PRESSURE: 126 MMHG | DIASTOLIC BLOOD PRESSURE: 80 MMHG | TEMPERATURE: 96.7 F | HEART RATE: 77 BPM | OXYGEN SATURATION: 95 % | WEIGHT: 223 LBS | RESPIRATION RATE: 12 BRPM | BODY MASS INDEX: 31.1 KG/M2

## 2019-10-24 DIAGNOSIS — M54.50 ACUTE LEFT-SIDED LOW BACK PAIN WITHOUT SCIATICA: Primary | ICD-10-CM

## 2019-10-24 PROCEDURE — 99214 OFFICE O/P EST MOD 30 MIN: CPT | Performed by: INTERNAL MEDICINE

## 2019-10-24 RX ORDER — CYCLOBENZAPRINE HCL 5 MG
5-10 TABLET ORAL 3 TIMES DAILY PRN
Qty: 30 TABLET | Refills: 0 | Status: SHIPPED | OUTPATIENT
Start: 2019-10-24 | End: 2019-10-29

## 2019-10-24 NOTE — PATIENT INSTRUCTIONS
1. Seems to be muscular.  However, if muscle relaxer is not helping and pain persists over the next few days-week or two, follow-up in clinic  2. Flexeril 5-10 mg up to 3 x day.  Watch for drowsiness  3. Start Aleve 1 pill twice daily x 1 week.  Take with food.  Drink plenty of water  4. Gentle stretches  5. Try heat or ice  6. Try topicals such as Aspercream with Lidocaine, Capsaicin, Icy Hot, Biofreeze, Salon Pas  7. Follow-up sooner if new symptoms develop (fever, diarrhea, etc), or pain worsens.      Acute Low Back Pain: Self Care at Home Instructions  Conservative Treatment    Remaining active supports a quicker recovery, keep moving. (ex. Walking, increase time & speed as tolerated).    Bed rest is not recommended. Minimize sitting. Do not remain in one position for extended periods of time.    Maintain routine activity with attention to correct posture; stop aggravating activities.    Over-the-counter pain medications for short term symptom control. (See below)    Muscle relaxants are sometimes helpful for few days, but may cause drowsiness. (See below)    Cold packs or heat based upon your preference.    Most people improve within 2 weeks; most have significant improvement within 4 weeks.    If symptoms worsen or you experience numbness, contact your provider immediately.    Low Back Pain Exercises   Exercises that stretch and strengthen the muscles of your abdomen and spine can help prevent back problems. If your back and abdominal muscles are strong, you can maintain good posture and keep your spine in its correct position.     If your muscles are tight, take a warm shower or bath before doing the exercises. Exercise on a rug or mat. Stop doing any exercise that causes pain until you have talked with your provider.     These exercises are intended only as suggestions. Ask your provider or physical therapist to help you develop an exercise program. Check with your provider before starting the exercises. Ask  your provider how many times a week you need to do the exercises.     Low Back Pain Exercises                             Cat and camel: Get down on your hands and knees. Let your stomach sag, allowing your back to curve downward. Hold this position for 5 seconds. Then arch your back and hold for 5 seconds. Do 3 sets of 10.       Pelvic tilt: Lie on your back with your knees bent and your feet flat on the floor. Tighten your abdominal muscles and push your lower back into the floor. Hold this position for 5 seconds, then relax. Do 3 sets of 10.       Extension exercise: Lie face down on the floor for 5 minutes. If this hurts too much, lie face down with a pillow under your stomach. This should relieve your leg or back pain. When you can lie on your stomach for 5 minutes without a pillow, then you can continue with the rest of this exercise.     After lying on your stomach for 5 minutes, prop yourself up on your elbows for another 5 minutes. Lie flat again for 1 minute, then press down on your hands and extend your elbows while keeping your hips flat on the floor. Hold for 1 second and lower yourself to the floor. Repeat 10 times. Do 4 sets. Rest for 2 minutes between sets. You should have no pain in your legs when you do this, but it is normal to feel pain in your lower back. Do this several times a day.     Developed by [employer]   Published by [employer].   Last modified: 2009-02-08   Last reviewed: 2008-07-07   This content is reviewed periodically and is subject to change as new health information becomes available. The information is intended to inform and educate and is not a replacement for medical evaluation, advice, diagnosis or treatment by a healthcare professional.   Adult Health Advisor 2009.1 Index  Adult Health Advisor 2009.1 Credits     2009 [facility] and/or its affiliates. All Rights Reserved.            (3) adequate

## 2019-10-26 ENCOUNTER — HOSPITAL ENCOUNTER (EMERGENCY)
Facility: CLINIC | Age: 54
Discharge: HOME | End: 2019-10-26
Attending: EMERGENCY MEDICINE | Admitting: EMERGENCY MEDICINE
Payer: COMMERCIAL

## 2019-10-26 ENCOUNTER — APPOINTMENT (OUTPATIENT)
Dept: CT IMAGING | Facility: CLINIC | Age: 54
End: 2019-10-26
Attending: EMERGENCY MEDICINE
Payer: COMMERCIAL

## 2019-10-26 VITALS
SYSTOLIC BLOOD PRESSURE: 130 MMHG | WEIGHT: 220 LBS | HEIGHT: 71 IN | RESPIRATION RATE: 18 BRPM | TEMPERATURE: 98 F | OXYGEN SATURATION: 95 % | BODY MASS INDEX: 30.8 KG/M2 | DIASTOLIC BLOOD PRESSURE: 92 MMHG

## 2019-10-26 DIAGNOSIS — M54.50 ACUTE LEFT-SIDED LOW BACK PAIN WITHOUT SCIATICA: ICD-10-CM

## 2019-10-26 LAB
ALBUMIN SERPL-MCNC: 3.4 G/DL (ref 3.4–5)
ALP SERPL-CCNC: 64 U/L (ref 40–150)
ALT SERPL W P-5'-P-CCNC: 38 U/L (ref 0–70)
ANION GAP SERPL CALCULATED.3IONS-SCNC: 7 MMOL/L (ref 3–14)
AST SERPL W P-5'-P-CCNC: 22 U/L (ref 0–45)
BASOPHILS # BLD AUTO: 0.1 10E9/L (ref 0–0.2)
BASOPHILS NFR BLD AUTO: 0.9 %
BILIRUB SERPL-MCNC: 0.2 MG/DL (ref 0.2–1.3)
BUN SERPL-MCNC: 12 MG/DL (ref 7–30)
CALCIUM SERPL-MCNC: 7.9 MG/DL (ref 8.5–10.1)
CHLORIDE SERPL-SCNC: 109 MMOL/L (ref 94–109)
CO2 SERPL-SCNC: 26 MMOL/L (ref 20–32)
CREAT SERPL-MCNC: 0.94 MG/DL (ref 0.66–1.25)
DIFFERENTIAL METHOD BLD: NORMAL
EOSINOPHIL # BLD AUTO: 0.4 10E9/L (ref 0–0.7)
EOSINOPHIL NFR BLD AUTO: 5 %
ERYTHROCYTE [DISTWIDTH] IN BLOOD BY AUTOMATED COUNT: 11.9 % (ref 10–15)
GFR SERPL CREATININE-BSD FRML MDRD: >90 ML/MIN/{1.73_M2}
GLUCOSE SERPL-MCNC: 120 MG/DL (ref 70–99)
HCT VFR BLD AUTO: 41.4 % (ref 40–53)
HGB BLD-MCNC: 14.3 G/DL (ref 13.3–17.7)
IMM GRANULOCYTES # BLD: 0 10E9/L (ref 0–0.4)
IMM GRANULOCYTES NFR BLD: 0.5 %
LYMPHOCYTES # BLD AUTO: 2.1 10E9/L (ref 0.8–5.3)
LYMPHOCYTES NFR BLD AUTO: 25.1 %
MCH RBC QN AUTO: 31 PG (ref 26.5–33)
MCHC RBC AUTO-ENTMCNC: 34.5 G/DL (ref 31.5–36.5)
MCV RBC AUTO: 90 FL (ref 78–100)
MONOCYTES # BLD AUTO: 0.7 10E9/L (ref 0–1.3)
MONOCYTES NFR BLD AUTO: 8.8 %
NEUTROPHILS # BLD AUTO: 4.9 10E9/L (ref 1.6–8.3)
NEUTROPHILS NFR BLD AUTO: 59.7 %
NRBC # BLD AUTO: 0 10*3/UL
NRBC BLD AUTO-RTO: 0 /100
PLATELET # BLD AUTO: 220 10E9/L (ref 150–450)
POTASSIUM SERPL-SCNC: 3.9 MMOL/L (ref 3.4–5.3)
PROT SERPL-MCNC: 6.7 G/DL (ref 6.8–8.8)
RBC # BLD AUTO: 4.62 10E12/L (ref 4.4–5.9)
SODIUM SERPL-SCNC: 142 MMOL/L (ref 133–144)
WBC # BLD AUTO: 8.2 10E9/L (ref 4–11)

## 2019-10-26 PROCEDURE — 96375 TX/PRO/DX INJ NEW DRUG ADDON: CPT

## 2019-10-26 PROCEDURE — 25000128 H RX IP 250 OP 636: Performed by: EMERGENCY MEDICINE

## 2019-10-26 PROCEDURE — 96361 HYDRATE IV INFUSION ADD-ON: CPT

## 2019-10-26 PROCEDURE — 99284 EMERGENCY DEPT VISIT MOD MDM: CPT | Mod: Z6 | Performed by: EMERGENCY MEDICINE

## 2019-10-26 PROCEDURE — 99285 EMERGENCY DEPT VISIT HI MDM: CPT | Mod: 25

## 2019-10-26 PROCEDURE — 96374 THER/PROPH/DIAG INJ IV PUSH: CPT

## 2019-10-26 PROCEDURE — 74177 CT ABD & PELVIS W/CONTRAST: CPT

## 2019-10-26 PROCEDURE — 25000125 ZZHC RX 250: Performed by: EMERGENCY MEDICINE

## 2019-10-26 PROCEDURE — 80053 COMPREHEN METABOLIC PANEL: CPT | Performed by: EMERGENCY MEDICINE

## 2019-10-26 PROCEDURE — 85025 COMPLETE CBC W/AUTO DIFF WBC: CPT | Performed by: EMERGENCY MEDICINE

## 2019-10-26 PROCEDURE — 72131 CT LUMBAR SPINE W/O DYE: CPT

## 2019-10-26 RX ORDER — OXYCODONE AND ACETAMINOPHEN 5; 325 MG/1; MG/1
1-2 TABLET ORAL EVERY 4 HOURS PRN
Qty: 12 TABLET | Refills: 0 | Status: SHIPPED | OUTPATIENT
Start: 2019-10-26 | End: 2019-10-28

## 2019-10-26 RX ORDER — KETOROLAC TROMETHAMINE 30 MG/ML
30 INJECTION, SOLUTION INTRAMUSCULAR; INTRAVENOUS ONCE
Status: COMPLETED | OUTPATIENT
Start: 2019-10-26 | End: 2019-10-26

## 2019-10-26 RX ORDER — HYDROMORPHONE HYDROCHLORIDE 1 MG/ML
0.5 INJECTION, SOLUTION INTRAMUSCULAR; INTRAVENOUS; SUBCUTANEOUS ONCE
Status: COMPLETED | OUTPATIENT
Start: 2019-10-26 | End: 2019-10-26

## 2019-10-26 RX ORDER — METHOCARBAMOL 750 MG/1
TABLET, FILM COATED ORAL
Qty: 40 TABLET | Refills: 0 | Status: SHIPPED | OUTPATIENT
Start: 2019-10-26 | End: 2020-09-21

## 2019-10-26 RX ORDER — IOPAMIDOL 755 MG/ML
100 INJECTION, SOLUTION INTRAVASCULAR ONCE
Status: COMPLETED | OUTPATIENT
Start: 2019-10-26 | End: 2019-10-26

## 2019-10-26 RX ADMIN — HYDROMORPHONE HYDROCHLORIDE 0.5 MG: 1 INJECTION, SOLUTION INTRAMUSCULAR; INTRAVENOUS; SUBCUTANEOUS at 19:23

## 2019-10-26 RX ADMIN — SODIUM CHLORIDE 67 ML: 9 INJECTION, SOLUTION INTRAVENOUS at 21:07

## 2019-10-26 RX ADMIN — SODIUM CHLORIDE 1000 ML: 9 INJECTION, SOLUTION INTRAVENOUS at 19:22

## 2019-10-26 RX ADMIN — IOPAMIDOL 100 ML: 755 INJECTION, SOLUTION INTRAVENOUS at 21:07

## 2019-10-26 RX ADMIN — HYDROMORPHONE HYDROCHLORIDE 0.5 MG: 10 INJECTION, SOLUTION INTRAMUSCULAR; INTRAVENOUS; SUBCUTANEOUS at 21:53

## 2019-10-26 RX ADMIN — KETOROLAC TROMETHAMINE 30 MG: 30 INJECTION, SOLUTION INTRAMUSCULAR at 18:48

## 2019-10-26 ASSESSMENT — MIFFLIN-ST. JEOR: SCORE: 1860.04

## 2019-10-26 NOTE — ED PROVIDER NOTES
History     Chief Complaint   Patient presents with     Flank Pain     left flank pain.  pt seen in ER last week and by Dr. Fierro 2 days ago, pain is much worse.     HPI  Daniel Schultz is a 54 year old male who presents for evaluation of left low back pain which began approximate 3 weeks ago, insidious onset with no preceding injury or trauma, with new acute worsened pain which is now sharp, severe and exacerbated by movement and change in position.  Pain was previously dull in nature, moderate in severity and without radiation.  Pain now radiates towards the left flank and left lateral lower abdomen.  No relief with OTC analgesics, or Flexeril prescribed in clinic 2 days ago. He denies abdominal pain, UTI signs or symptoms, hematuria, constipation, diarrhea or signs or symptoms of GI bleeding.  No fever or chills.  No rash. He denies prior history of similar pain back problems. He was seen in primary care clinic 2 days ago and prescribed Flexeril and instructed to use Aleve and topical analgesics for presumed benign musculoskeletal pain.  He was seen in the emergency department 17 days ago for left lower quadrant and left flank pain with an unremarkable evaluation including CT scan of the abdomen/pelvis with contrast and laboratory evaluation.  Urinalysis showed trace LCE and he was treated with Bactrim DS for possible UTI, with no improvement.  Previous Records Reviewed:  COLONOSCOPY 08/17/2016    Findings:        The perianal and digital rectal examinations were normal.        A few small-mouthed diverticula were found in the entire colon.        No additional abnormalities were found on retroflexion.   Impression:  Diverticulosis in the entire examined colon.       Allergies:  Allergies   Allergen Reactions     Nkda [No Known Drug Allergies]        Problem List:    Patient Active Problem List    Diagnosis Date Noted     JOSE ALFREDO (generalized anxiety disorder) 02/26/2019     Priority: Medium     Moderate single  current episode of major depressive disorder (H) 01/08/2019     Priority: Medium     JAZMIN (obstructive sleep apnea) 08/18/2015     Priority: Medium     Severe JAZMIN   - HST on 8/17/2015 with weight 240 lbs, AHI 34, chris desat 86%, strong positional component.  Events labeled as central suspected to be post-arousal and feel it is most consistent with JAZMIN  He does not wear CPAP - 1/8/2019         Pre-diabetes 07/07/2015     Priority: Medium     Nonalcoholic steatohepatitis (WILKINSON) 05/26/2015     Priority: Medium     Fatty liver seen on US.  Had mild transaminitis       Meniscus tear 09/25/2013     Priority: Medium     Hyperlipidemia LDL goal <160 12/21/2010     Priority: Medium        Past Medical History:    No past medical history on file.    Past Surgical History:    Past Surgical History:   Procedure Laterality Date     ARTHROSCOPY ANKLE, OPEN REPAIR LIGAMENT, COMBINED Right 1/5/2017    Procedure: COMBINED ARTHROSCOPY ANKLE, OPEN REPAIR LIGAMENT;  Surgeon: Avinash Dick DPM;  Location: [facility]     ARTHROSCOPY KNEE IRRIGATION AND DEBRIDEMENT  9/27/2013    Procedure: ARTHROSCOPY KNEE IRRIGATION AND DEBRIDEMENT;;  Surgeon: Ley, Jeffrey Duane, MD;  Location: [facility]     ARTHROSCOPY KNEE WITH MEDIAL MENISCECTOMY  9/27/2013    Procedure: ARTHROSCOPY KNEE WITH MEDIAL MENISCECTOMY;  Right Knee Arthroscopy Partial Menisectomy and Debridement,Left Knee Arthroscopy Debridement and Loose Body Removal--needs assist.;  Surgeon: Ley, Jeffrey Duane, MD;  Location: [facility]     COLONOSCOPY N/A 8/17/2016    Procedure: COLONOSCOPY;  Surgeon: Rudolph Nazario MD;  Location: [facility]     HEMORRHOIDECTOMY  2000    Internal and external hemorrhoidectomy     REPAIR TENDON ANKLE Right 1/25/2018    Procedure: REPAIR TENDON ANKLE;  Right Ankle Posterior Tibial Tendon Repair,Peroneus Brevis Repair, Medial Ligament Repair;  Surgeon: Avinash Dick DPM;  Location: [facility]     REPAIR TENDON FOOT Right 4/26/2018    Procedure: REPAIR TENDON  "FOOT;  Right insertional posterior tibial tendon repair and tendon transfer.;  Surgeon: Avinash Dick DPM;  Location: [facility]     SURGICAL HISTORY OF -   6/2001    Left Achilles tendon repair       Family History:    Family History   Problem Relation Age of Onset     Aneurysm Mother      Depression Father        Social History:  Marital Status:   [2]  Social History     Tobacco Use     Smoking status: Never Smoker     Smokeless tobacco: Never Used   Substance Use Topics     Alcohol use: Yes     Alcohol/week: 0.0 standard drinks     Comment: Occasional     Drug use: No        Medications:    methocarbamol (ROBAXIN) 750 MG tablet  oxyCODONE-acetaminophen (PERCOCET) 5-325 MG tablet  cyclobenzaprine (FLEXERIL) 5 MG tablet  sertraline (ZOLOFT) 50 MG tablet      Review of Systems  As mentioned above in the history present illness.  All other systems were reviewed and are negative.    Physical Exam   BP: (!) 156/89  Heart Rate: 77  Temp: 98  F (36.7  C)  Resp: 18  Height: 180.3 cm (5' 11\")  Weight: 99.8 kg (220 lb)  SpO2: 98 %      Physical Exam  Vitals signs and nursing note reviewed.   Constitutional:       General: He is in acute distress.      Appearance: Normal appearance. He is well-developed. He is not ill-appearing or diaphoretic.   HENT:      Head: Normocephalic and atraumatic.      Right Ear: External ear normal.      Left Ear: External ear normal.      Nose: Nose normal.      Mouth/Throat:      Mouth: Mucous membranes are moist.   Eyes:      General: No scleral icterus.     Extraocular Movements: Extraocular movements intact.      Conjunctiva/sclera: Conjunctivae normal.   Neck:      Musculoskeletal: Normal range of motion and neck supple.      Trachea: No tracheal deviation.   Cardiovascular:      Rate and Rhythm: Normal rate and regular rhythm.      Pulses: Normal pulses.      Heart sounds: Normal heart sounds. No murmur. No friction rub. No gallop.    Pulmonary:      Effort: Pulmonary effort is " normal. No respiratory distress.      Breath sounds: Normal breath sounds. No wheezing, rhonchi or rales.   Abdominal:      General: There is no distension.      Palpations: Abdomen is soft. There is no mass.      Tenderness: There is no tenderness. There is no right CVA tenderness, left CVA tenderness, guarding or rebound.      Hernia: No hernia is present.   Musculoskeletal:      Lumbar back: He exhibits decreased range of motion and tenderness. He exhibits no bony tenderness, no swelling, no edema and no spasm.        Back:       Right lower leg: No edema.      Left lower leg: No edema.   Skin:     General: Skin is warm and dry.      Coloration: Skin is not pale.      Findings: No erythema or rash.   Neurological:      General: No focal deficit present.      Mental Status: He is alert and oriented to person, place, and time.      Sensory: Sensation is intact. No sensory deficit.      Motor: Motor function is intact. No weakness or abnormal muscle tone.      Coordination: Coordination normal.   Psychiatric:         Mood and Affect: Mood normal.         Behavior: Behavior normal.         ED Course        Procedures                 Results for orders placed or performed during the hospital encounter of 10/26/19 (from the past 24 hour(s))   CBC with platelets differential   Result Value Ref Range    WBC 8.2 4.0 - 11.0 10e9/L    RBC Count 4.62 4.4 - 5.9 10e12/L    Hemoglobin 14.3 13.3 - 17.7 g/dL    Hematocrit 41.4 40.0 - 53.0 %    MCV 90 78 - 100 fl    MCH 31.0 26.5 - 33.0 pg    MCHC 34.5 31.5 - 36.5 g/dL    RDW 11.9 10.0 - 15.0 %    Platelet Count 220 150 - 450 10e9/L    Diff Method Automated Method     % Neutrophils 59.7 %    % Lymphocytes 25.1 %    % Monocytes 8.8 %    % Eosinophils 5.0 %    % Basophils 0.9 %    % Immature Granulocytes 0.5 %    Nucleated RBCs 0 0 /100    Absolute Neutrophil 4.9 1.6 - 8.3 10e9/L    Absolute Lymphocytes 2.1 0.8 - 5.3 10e9/L    Absolute Monocytes 0.7 0.0 - 1.3 10e9/L    Absolute  Eosinophils 0.4 0.0 - 0.7 10e9/L    Absolute Basophils 0.1 0.0 - 0.2 10e9/L    Abs Immature Granulocytes 0.0 0 - 0.4 10e9/L    Absolute Nucleated RBC 0.0    Comprehensive metabolic panel   Result Value Ref Range    Sodium 142 133 - 144 mmol/L    Potassium 3.9 3.4 - 5.3 mmol/L    Chloride 109 94 - 109 mmol/L    Carbon Dioxide 26 20 - 32 mmol/L    Anion Gap 7 3 - 14 mmol/L    Glucose 120 (H) 70 - 99 mg/dL    Urea Nitrogen 12 7 - 30 mg/dL    Creatinine 0.94 0.66 - 1.25 mg/dL    GFR Estimate >90 >60 mL/min/[1.73_m2]    GFR Estimate If Black >90 >60 mL/min/[1.73_m2]    Calcium 7.9 (L) 8.5 - 10.1 mg/dL    Bilirubin Total 0.2 0.2 - 1.3 mg/dL    Albumin 3.4 3.4 - 5.0 g/dL    Protein Total 6.7 (L) 6.8 - 8.8 g/dL    Alkaline Phosphatase 64 40 - 150 U/L    ALT 38 0 - 70 U/L    AST 22 0 - 45 U/L   CT Lumbar Spine w/o Contrast    Narrative    CT LUMBAR SPINE WITHOUT CONTRAST  10/26/2019 9:03 PM     HISTORY: Intractable left lumbar back pain.     TECHNIQUE: Axial images of the lumbar spine were obtained without  intravenous contrast. Multiplanar reformations were performed.   Radiation dose for this scan was reduced using automated exposure  control, adjustment of the mA and/or kV according to patient size, or  iterative reconstruction technique.    COMPARISON: None.    FINDINGS:  A small riblet is present at the L1 vertebra on the left  with transverse process on the right. Alignment is significant for  S-shaped curvature of the thoracolumbar spine with dextroconvex  curvature of the thoracolumbar junction. There is also grade 1  retrolisthesis of L1 on L2. Multilevel mild disc height loss is  present. Moderate disc height loss is present at L1-2 with subchondral  cystic change/Schmorl's node formation at L3-4, L4-5, and L5-S1. These  contribute to multilevel mild spinal canal stenosis. Disc bulge at  L4-5 is asymmetric to the right with uncovertebral osteophyte  contributing to moderate right foraminal stenosis. Disc bulge at  L5-S1  probably contacts the bilateral traversing S1 nerve roots within the  lateral recesses (series 5 image 109). Multilevel facet arthropathy is  present. This is moderate bilaterally at L3-4, moderate on the right  at L5-S1, and severe on the left at L5-S1. Paraspinal soft tissues are  unremarkable.      Impression    IMPRESSION:  Mild to moderate degenerative change.       JONNY GOEL MD   CT Abdomen Pelvis w Contrast    Narrative    CT ABDOMEN PELVIS WITH CONTRAST   10/26/2019 9:08 PM     HISTORY: Intractable left flank and lateral left lower quadrant  abdominal pain.    TECHNIQUE:   100 mL Isovue 370. CT images of the abdomen and pelvis  following nonionic intravenous contrast. Radiation dose for this scan  was reduced using automated exposure control, adjustment of the mA  and/or kV according to patient size, or iterative reconstruction  technique.    COMPARISON: 10/9/2019.    FINDINGS:  The liver, gallbladder, spleen, pancreas, adrenal glands,  and kidneys contain no worrisome abnormalities. No large abdominal or  retroperitoneal lymph nodes. No evidence of bowel obstruction, free  air, or ascites. The appendix is normal. There are scattered colonic  diverticula but no evidence of diverticulitis. No abdominal or  retroperitoneal lymphadenopathy. No pelvic adenopathy, free fluid, or  mass. Visualized bones are unremarkable.      Impression    IMPRESSION: No acute abnormality in the abdomen or pelvis. No cause  for pain demonstrated.      SACHA DIXON MD       Medications   ketorolac (TORADOL) injection 30 mg (30 mg Intravenous Given 10/26/19 1848)   HYDROmorphone (PF) (DILAUDID) injection 0.5 mg (0.5 mg Intravenous Given 10/26/19 1923)   0.9% sodium chloride BOLUS (0 mLs Intravenous Stopped 10/26/19 2012)   iopamidol (ISOVUE-370) solution 100 mL (100 mLs Intravenous Given 10/26/19 2107)   sodium chloride 0.9 % bag 500mL for CT scan flush use (67 mLs Intravenous Given 10/26/19 2107)   HYDROmorphone  (PF) (DILAUDID) injection 0.5 mg (0.5 mg Intravenous Given 10/26/19 6012)     No significant improvement after Toradol.  He drove himself to emergency department, but can give a ride home.  Will give Dilaudid and proceed with CT imaging due to the severe, intractability of the pain.  We discussed risks and benefits of repeat CT imaging the risk of radiation exposure he is comfortable proceeding with repeat CT imaging of the abdomen/pelvis, and CT imaging of the lumbar spine (MRI not currently available).    Assessments & Plan (with Medical Decision Making)   54-year-old male with atraumatic left low back pain for several weeks, worsened in the past 24 hours, now severe and without associated evidence of neurogenic claudication or cauda equina syndrome.  He was seen in clinic for this 2 days ago and prescribed Flexeril, with no relief.  He was seen in the emergency 17 days ago and had CT of the abdomen/pelvis with IV contrast and laboratory evaluation which was unremarkable.  Due to the severity and intractable nature of his pain CT imaging and laboratory evaluation was repeated, and a CT of the lumbar spine was obtained.  These were remarkable for evidence of lumbar degenerative disc disease, abnormal curvature of the thoracolumbar spine, retrolisthesis of L1 on L2, subchondral cystic change/Schmorl's node formation at the L3-S1 levels contributing to multilevel mild spinal canal stenosis.  An orthopedic  referral was made for his follow-up this coming week and is prescribed Percocet X 12 tablets to use for pain refractory to NSAID, and Robaxin for spasm.  He was counseled on instructions for supportive care and return as needed for new or worsening symptoms, or new problems or concerns.       I have reviewed the nursing notes.    I have reviewed the findings, diagnosis, plan and need for follow up with the patient.    New Prescriptions    METHOCARBAMOL (ROBAXIN) 750 MG TABLET    1 tab by mouth every 4 hrs.  or 2 tabs every 8 hrs. as needed for muscle spasm.    OXYCODONE-ACETAMINOPHEN (PERCOCET) 5-325 MG TABLET    Take 1-2 tablets by mouth every 4 hours as needed for pain       Final diagnoses:   Acute left-sided low back pain without sciatica       10/26/2019   [facility] EMERGENCY DEPARTMENT     Joey Robbins MD  10/26/19 1318

## 2019-10-26 NOTE — ED NOTES
Pt here with left flank pain, was seen here last week and had a negative work-up for kidney stones. Saw Dr. Keyes in clinic 2-3 days ago and was given a muscle relaxer. Pt states that when he woke up today the pain became sharp and unbearable, used to be a dull ache. Denies any urinary symptoms, no N/V/D.

## 2019-10-26 NOTE — ED AVS SNAPSHOT
[facility] Emergency Department  [address] [address]  Phone:  [phone]  Fax:  [phone]                                    Daniel Schultz   MRN: [id number]    Department:  [facility] Emergency Department   Date of Visit:  10/26/2019           After Visit Summary Signature Page    I have received my discharge instructions, and my questions have been answered. I have discussed any challenges I see with this plan with the nurse or doctor.    ..........................................................................................................................................  Patient/Patient Representative Signature      ..........................................................................................................................................  Patient Representative Print Name and Relationship to Patient    ..................................................               ................................................  Date                                   Time    ..........................................................................................................................................  Reviewed by Signature/Title    ...................................................              ..............................................  Date                                               Time          22EPIC Rev 08/18

## 2019-10-28 ENCOUNTER — E-VISIT (OUTPATIENT)
Dept: FAMILY MEDICINE | Facility: CLINIC | Age: 54
End: 2019-10-28
Payer: COMMERCIAL

## 2019-10-28 DIAGNOSIS — M54.42 ACUTE LEFT-SIDED LOW BACK PAIN WITH LEFT-SIDED SCIATICA: Primary | ICD-10-CM

## 2019-10-28 PROCEDURE — 99444 ZZC PHYSICIAN ONLINE EVALUATION & MANAGEMENT SERVICE: CPT | Performed by: INTERNAL MEDICINE

## 2019-10-28 RX ORDER — METHYLPREDNISOLONE 4 MG
TABLET, DOSE PACK ORAL
Qty: 21 TABLET | Refills: 0 | Status: SHIPPED | OUTPATIENT
Start: 2019-10-28 | End: 2020-09-21

## 2019-10-28 RX ORDER — OXYCODONE AND ACETAMINOPHEN 5; 325 MG/1; MG/1
1-2 TABLET ORAL EVERY 4 HOURS PRN
Qty: 12 TABLET | Refills: 0 | Status: SHIPPED | OUTPATIENT
Start: 2019-10-28 | End: 2019-10-29 | Stop reason: ALTCHOICE

## 2019-10-29 ENCOUNTER — APPOINTMENT (OUTPATIENT)
Dept: MRI IMAGING | Facility: CLINIC | Age: 54
End: 2019-10-29
Attending: EMERGENCY MEDICINE
Payer: COMMERCIAL

## 2019-10-29 ENCOUNTER — HOSPITAL ENCOUNTER (EMERGENCY)
Facility: CLINIC | Age: 54
Discharge: HOME | End: 2019-10-29
Attending: EMERGENCY MEDICINE | Admitting: EMERGENCY MEDICINE
Payer: COMMERCIAL

## 2019-10-29 ENCOUNTER — MYC MEDICAL ADVICE (OUTPATIENT)
Dept: FAMILY MEDICINE | Facility: CLINIC | Age: 54
End: 2019-10-29

## 2019-10-29 VITALS
DIASTOLIC BLOOD PRESSURE: 71 MMHG | OXYGEN SATURATION: 94 % | WEIGHT: 230 LBS | TEMPERATURE: 97.7 F | BODY MASS INDEX: 32.2 KG/M2 | HEART RATE: 69 BPM | HEIGHT: 71 IN | SYSTOLIC BLOOD PRESSURE: 111 MMHG | RESPIRATION RATE: 16 BRPM

## 2019-10-29 DIAGNOSIS — M54.6 CHRONIC LEFT-SIDED THORACIC BACK PAIN: ICD-10-CM

## 2019-10-29 DIAGNOSIS — R93.7 ABNORMAL MRI, LUMBAR SPINE: ICD-10-CM

## 2019-10-29 DIAGNOSIS — G89.29 CHRONIC LEFT-SIDED THORACIC BACK PAIN: ICD-10-CM

## 2019-10-29 DIAGNOSIS — R93.7 ABNORMAL MRI, THORACIC SPINE: ICD-10-CM

## 2019-10-29 LAB
ALBUMIN UR-MCNC: NEGATIVE MG/DL
ANION GAP SERPL CALCULATED.3IONS-SCNC: 7 MMOL/L (ref 3–14)
APPEARANCE UR: CLEAR
BASOPHILS # BLD AUTO: 0 10E9/L (ref 0–0.2)
BASOPHILS NFR BLD AUTO: 0.2 %
BILIRUB UR QL STRIP: NEGATIVE
BUN SERPL-MCNC: 12 MG/DL (ref 7–30)
CALCIUM SERPL-MCNC: 8.4 MG/DL (ref 8.5–10.1)
CHLORIDE SERPL-SCNC: 106 MMOL/L (ref 94–109)
CO2 SERPL-SCNC: 26 MMOL/L (ref 20–32)
COLOR UR AUTO: YELLOW
CREAT SERPL-MCNC: 0.85 MG/DL (ref 0.66–1.25)
DIFFERENTIAL METHOD BLD: ABNORMAL
EOSINOPHIL # BLD AUTO: 0.1 10E9/L (ref 0–0.7)
EOSINOPHIL NFR BLD AUTO: 0.3 %
ERYTHROCYTE [DISTWIDTH] IN BLOOD BY AUTOMATED COUNT: 12.1 % (ref 10–15)
GFR SERPL CREATININE-BSD FRML MDRD: >90 ML/MIN/{1.73_M2}
GLUCOSE SERPL-MCNC: 235 MG/DL (ref 70–99)
GLUCOSE UR STRIP-MCNC: 50 MG/DL
HCT VFR BLD AUTO: 44.2 % (ref 40–53)
HGB BLD-MCNC: 15.1 G/DL (ref 13.3–17.7)
HGB UR QL STRIP: NEGATIVE
IMM GRANULOCYTES # BLD: 0.1 10E9/L (ref 0–0.4)
IMM GRANULOCYTES NFR BLD: 0.6 %
KETONES UR STRIP-MCNC: NEGATIVE MG/DL
LEUKOCYTE ESTERASE UR QL STRIP: NEGATIVE
LYMPHOCYTES # BLD AUTO: 1 10E9/L (ref 0.8–5.3)
LYMPHOCYTES NFR BLD AUTO: 5.3 %
MCH RBC QN AUTO: 30.3 PG (ref 26.5–33)
MCHC RBC AUTO-ENTMCNC: 34.2 G/DL (ref 31.5–36.5)
MCV RBC AUTO: 89 FL (ref 78–100)
MONOCYTES # BLD AUTO: 0.8 10E9/L (ref 0–1.3)
MONOCYTES NFR BLD AUTO: 4.1 %
NEUTROPHILS # BLD AUTO: 16.3 10E9/L (ref 1.6–8.3)
NEUTROPHILS NFR BLD AUTO: 89.5 %
NITRATE UR QL: NEGATIVE
NRBC # BLD AUTO: 0 10*3/UL
NRBC BLD AUTO-RTO: 0 /100
PH UR STRIP: 5 PH (ref 5–7)
PLATELET # BLD AUTO: 238 10E9/L (ref 150–450)
POTASSIUM SERPL-SCNC: 3.9 MMOL/L (ref 3.4–5.3)
RBC # BLD AUTO: 4.98 10E12/L (ref 4.4–5.9)
SODIUM SERPL-SCNC: 139 MMOL/L (ref 133–144)
SOURCE: ABNORMAL
SP GR UR STRIP: 1.02 (ref 1–1.03)
UROBILINOGEN UR STRIP-MCNC: 0 MG/DL (ref 0–2)
WBC # BLD AUTO: 18.2 10E9/L (ref 4–11)

## 2019-10-29 PROCEDURE — 85025 COMPLETE CBC W/AUTO DIFF WBC: CPT | Performed by: EMERGENCY MEDICINE

## 2019-10-29 PROCEDURE — 72146 MRI CHEST SPINE W/O DYE: CPT

## 2019-10-29 PROCEDURE — 81003 URINALYSIS AUTO W/O SCOPE: CPT | Performed by: EMERGENCY MEDICINE

## 2019-10-29 PROCEDURE — 96376 TX/PRO/DX INJ SAME DRUG ADON: CPT

## 2019-10-29 PROCEDURE — 99284 EMERGENCY DEPT VISIT MOD MDM: CPT | Mod: 25

## 2019-10-29 PROCEDURE — 72148 MRI LUMBAR SPINE W/O DYE: CPT

## 2019-10-29 PROCEDURE — 99284 EMERGENCY DEPT VISIT MOD MDM: CPT | Mod: Z6 | Performed by: EMERGENCY MEDICINE

## 2019-10-29 PROCEDURE — 96374 THER/PROPH/DIAG INJ IV PUSH: CPT

## 2019-10-29 PROCEDURE — 25000128 H RX IP 250 OP 636: Performed by: EMERGENCY MEDICINE

## 2019-10-29 PROCEDURE — 80048 BASIC METABOLIC PNL TOTAL CA: CPT | Performed by: EMERGENCY MEDICINE

## 2019-10-29 RX ORDER — HYDROMORPHONE HYDROCHLORIDE 2 MG/1
2 TABLET ORAL EVERY 6 HOURS PRN
Qty: 10 TABLET | Refills: 0 | Status: SHIPPED | OUTPATIENT
Start: 2019-10-29 | End: 2019-11-01

## 2019-10-29 RX ORDER — HYDROMORPHONE HYDROCHLORIDE 1 MG/ML
0.5 INJECTION, SOLUTION INTRAMUSCULAR; INTRAVENOUS; SUBCUTANEOUS EVERY 30 MIN PRN
Status: DISCONTINUED | OUTPATIENT
Start: 2019-10-29 | End: 2019-10-29 | Stop reason: HOSPADM

## 2019-10-29 RX ADMIN — HYDROMORPHONE HYDROCHLORIDE 0.5 MG: 1 INJECTION, SOLUTION INTRAMUSCULAR; INTRAVENOUS; SUBCUTANEOUS at 16:52

## 2019-10-29 RX ADMIN — HYDROMORPHONE HYDROCHLORIDE 0.5 MG: 1 INJECTION, SOLUTION INTRAMUSCULAR; INTRAVENOUS; SUBCUTANEOUS at 17:39

## 2019-10-29 RX ADMIN — HYDROMORPHONE HYDROCHLORIDE 0.5 MG: 1 INJECTION, SOLUTION INTRAMUSCULAR; INTRAVENOUS; SUBCUTANEOUS at 15:52

## 2019-10-29 ASSESSMENT — ENCOUNTER SYMPTOMS
GASTROINTESTINAL NEGATIVE: 1
FLANK PAIN: 1
CARDIOVASCULAR NEGATIVE: 1
ALLERGIC/IMMUNOLOGIC NEGATIVE: 1
NEUROLOGICAL NEGATIVE: 1
PSYCHIATRIC NEGATIVE: 1
BACK PAIN: 1
CONSTITUTIONAL NEGATIVE: 1
ENDOCRINE NEGATIVE: 1
HEMATOLOGIC/LYMPHATIC NEGATIVE: 1
EYES NEGATIVE: 1
RESPIRATORY NEGATIVE: 1

## 2019-10-29 ASSESSMENT — MIFFLIN-ST. JEOR: SCORE: 1905.4

## 2019-10-29 NOTE — ED TRIAGE NOTES
left flank pain, much worse over the last 2 days; has been seen for this, trying meds without relief.

## 2019-10-29 NOTE — TELEPHONE ENCOUNTER
Patient advised of provider message to go to Urgent Care or have an Office visit in clinic through [facility].

## 2019-10-29 NOTE — ED AVS SNAPSHOT
[facility] Emergency Department  [address] [address]  Phone:  [phone]  Fax:  [phone]                                    Daniel Schultz   MRN: [id number]    Department:  [facility] Emergency Department   Date of Visit:  10/29/2019           After Visit Summary Signature Page    I have received my discharge instructions, and my questions have been answered. I have discussed any challenges I see with this plan with the nurse or doctor.    ..........................................................................................................................................  Patient/Patient Representative Signature      ..........................................................................................................................................  Patient Representative Print Name and Relationship to Patient    ..................................................               ................................................  Date                                   Time    ..........................................................................................................................................  Reviewed by Signature/Title    ...................................................              ..............................................  Date                                               Time          22EPIC Rev 08/18

## 2019-10-29 NOTE — ED PROVIDER NOTES
"  History     Chief Complaint   Patient presents with     Flank Pain     left flank pain, much worse over the last 2 days; has been seen for this, trying meds without relief.     HPI  Daniel Schultz is a 54 year old male with a history of hyperlipidemia, and pre-diabetes who presents to the emergency department today for evaluation of flank pain. Patient has been suffering from lower left flank pain for over three weeks. He has been seen in the ED on October 9th and October 26th for the same pain. On the 6th a CT was performed and showed arthritis in his back. Patient has been prescribed percocet, robaxin, and Flexeril to help with his pain. Patient reports that the medications have not been helping him. Patient states that over the past two days his pain has been getting worse. Last night he became diaphoretic, nauseous, and dizzy with his pain. Patient reports that the pain is constant but becomes more intense sometimes. He reports that prior to an increase in pain he develops a sensation of tingling or \"falling asleep\" in his abdomen. Patient denies any pain in his thighs or legs, numbness in his feet, trouble controlling his bowels or bladder, or any fevers or chills. Patient reports that he has an appointment scheduled for November 4th with a specialist. Patient has no known medical allergies.       Social History: The patient lives in [place], [place]. He presents to the ED via personal car with his son.    Allergies:  Allergies   Allergen Reactions     Nkda [No Known Drug Allergies]        Problem List:    Patient Active Problem List    Diagnosis Date Noted     JOSE ALFREDO (generalized anxiety disorder) 02/26/2019     Priority: Medium     Moderate single current episode of major depressive disorder (H) 01/08/2019     Priority: Medium     JAZMIN (obstructive sleep apnea) 08/18/2015     Priority: Medium     Severe JAZMIN   - HST on 8/17/2015 with weight 240 lbs, AHI 34, chris desat 86%, strong positional component.  Events " labeled as central suspected to be post-arousal and feel it is most consistent with JAZMIN  He does not wear CPAP - 1/8/2019         Pre-diabetes 07/07/2015     Priority: Medium     Nonalcoholic steatohepatitis (WILKINSON) 05/26/2015     Priority: Medium     Fatty liver seen on US.  Had mild transaminitis       Meniscus tear 09/25/2013     Priority: Medium     Hyperlipidemia LDL goal <160 12/21/2010     Priority: Medium        Past Medical History:    No past medical history on file.    Past Surgical History:    Past Surgical History:   Procedure Laterality Date     ARTHROSCOPY ANKLE, OPEN REPAIR LIGAMENT, COMBINED Right 1/5/2017    Procedure: COMBINED ARTHROSCOPY ANKLE, OPEN REPAIR LIGAMENT;  Surgeon: Avinash Dick DPM;  Location: [facility]     ARTHROSCOPY KNEE IRRIGATION AND DEBRIDEMENT  9/27/2013    Procedure: ARTHROSCOPY KNEE IRRIGATION AND DEBRIDEMENT;;  Surgeon: Ley, Jeffrey Duane, MD;  Location: [facility]     ARTHROSCOPY KNEE WITH MEDIAL MENISCECTOMY  9/27/2013    Procedure: ARTHROSCOPY KNEE WITH MEDIAL MENISCECTOMY;  Right Knee Arthroscopy Partial Menisectomy and Debridement,Left Knee Arthroscopy Debridement and Loose Body Removal--needs assist.;  Surgeon: Ley, Jeffrey Duane, MD;  Location: [facility]     COLONOSCOPY N/A 8/17/2016    Procedure: COLONOSCOPY;  Surgeon: Rudolph Nazario MD;  Location: [facility]     HEMORRHOIDECTOMY  2000    Internal and external hemorrhoidectomy     REPAIR TENDON ANKLE Right 1/25/2018    Procedure: REPAIR TENDON ANKLE;  Right Ankle Posterior Tibial Tendon Repair,Peroneus Brevis Repair, Medial Ligament Repair;  Surgeon: Avinash Dick DPM;  Location: [facility]     REPAIR TENDON FOOT Right 4/26/2018    Procedure: REPAIR TENDON FOOT;  Right insertional posterior tibial tendon repair and tendon transfer.;  Surgeon: Avinash Dick DPM;  Location: [facility]     SURGICAL HISTORY OF -   6/2001    Left Achilles tendon repair       Family History:    Family History   Problem Relation Age of  "Onset     Aneurysm Mother      Depression Father        Social History:  Marital Status:   [2]  Social History     Tobacco Use     Smoking status: Never Smoker     Smokeless tobacco: Never Used   Substance Use Topics     Alcohol use: Yes     Alcohol/week: 0.0 standard drinks     Comment: Occasional     Drug use: No        Medications:    HYDROmorphone (DILAUDID) 2 MG tablet  cyclobenzaprine (FLEXERIL) 5 MG tablet  methocarbamol (ROBAXIN) 750 MG tablet  methylPREDNISolone (MEDROL DOSEPAK) 4 MG tablet therapy pack  sertraline (ZOLOFT) 50 MG tablet          Review of Systems   Constitutional: Negative.    HENT: Negative.    Eyes: Negative.    Respiratory: Negative.    Cardiovascular: Negative.    Gastrointestinal: Negative.    Endocrine: Negative.    Genitourinary: Positive for flank pain.   Musculoskeletal: Positive for back pain.   Skin: Negative.    Allergic/Immunologic: Negative.    Neurological: Negative.    Hematological: Negative.    Psychiatric/Behavioral: Negative.    All other systems reviewed and are negative.      Physical Exam   BP: (!) 147/87  Pulse: 76  Heart Rate: 90  Temp: 97.7  F (36.5  C)  Resp: 16  Height: 180.3 cm (5' 11\")  Weight: 104.3 kg (230 lb)  SpO2: 97 %      Physical Exam  Constitutional:       General: He is not in acute distress.     Appearance: He is not ill-appearing, toxic-appearing or diaphoretic.   HENT:      Head: Normocephalic and atraumatic.      Nose: No congestion or rhinorrhea.      Mouth/Throat:      Mouth: Mucous membranes are moist.      Pharynx: No oropharyngeal exudate or posterior oropharyngeal erythema.   Eyes:      General: No scleral icterus.        Right eye: No discharge.         Left eye: No discharge.      Pupils: Pupils are equal, round, and reactive to light.   Neck:      Musculoskeletal: Normal range of motion. No neck rigidity or muscular tenderness.      Vascular: No carotid bruit.   Cardiovascular:      Rate and Rhythm: Normal rate and regular rhythm. " "     Pulses: Normal pulses.      Heart sounds: No murmur. No friction rub. No gallop.    Pulmonary:      Effort: Pulmonary effort is normal. No respiratory distress.      Breath sounds: No stridor. No wheezing, rhonchi or rales.   Chest:      Chest wall: No tenderness.   Abdominal:      General: Abdomen is flat. There is no distension.      Palpations: There is no mass.      Tenderness: There is no tenderness. There is no right CVA tenderness, left CVA tenderness, guarding or rebound.      Hernia: No hernia is present.   Musculoskeletal:         General: Tenderness present. No swelling, deformity or signs of injury.      Lumbar back: He exhibits pain.        Back:       Right lower leg: No edema.      Left lower leg: No edema.   Lymphadenopathy:      Cervical: No cervical adenopathy.   Skin:     General: Skin is warm.      Capillary Refill: Capillary refill takes less than 2 seconds.      Coloration: Skin is not jaundiced or pale.      Findings: No bruising, erythema, lesion or rash.   Neurological:      Mental Status: He is alert and oriented to person, place, and time.      Cranial Nerves: No cranial nerve deficit.      Sensory: No sensory deficit.      Motor: No weakness.      Coordination: Coordination normal.      Gait: Gait normal.      Deep Tendon Reflexes: Reflexes normal.   Psychiatric:         Mood and Affect: Mood normal.         Behavior: Behavior normal.         Thought Content: Thought content normal.         Judgement: Judgment normal.         ED Course        Procedures               Critical Care time:  none               ED medications:  Medications   HYDROmorphone (PF) (DILAUDID) injection 0.5 mg (0.5 mg Intravenous Given 10/29/19 1739)       ED Vitals:  Vitals:    10/29/19 1453 10/29/19 1600 10/29/19 1730   BP: (!) 147/87 129/82 112/73   Pulse:  76    Resp: 16     Temp: 97.7  F (36.5  C)     TempSrc: Oral     SpO2: 97% 93%    Weight: 104.3 kg (230 lb)     Height: 1.803 m (5' 11\")         Prior or " recent diagnostic imaging and work-up:  CT ABDOMEN PELVIS WITH CONTRAST   10/26/2019 9:08 PM      HISTORY: Intractable left flank and lateral left lower quadrant  abdominal pain.     TECHNIQUE:   100 mL Isovue 370. CT images of the abdomen and pelvis  following nonionic intravenous contrast. Radiation dose for this scan  was reduced using automated exposure control, adjustment of the mA  and/or kV according to patient size, or iterative reconstruction  technique.     COMPARISON: 10/9/2019.     FINDINGS:  The liver, gallbladder, spleen, pancreas, adrenal glands,  and kidneys contain no worrisome abnormalities. No large abdominal or  retroperitoneal lymph nodes. No evidence of bowel obstruction, free  air, or ascites. The appendix is normal. There are scattered colonic  diverticula but no evidence of diverticulitis. No abdominal or  retroperitoneal lymphadenopathy. No pelvic adenopathy, free fluid, or  mass. Visualized bones are unremarkable.                                                                    IMPRESSION: No acute abnormality in the abdomen or pelvis. No cause  for pain demonstrated.       SACHA DIXON MD    CT LUMBAR SPINE WITHOUT CONTRAST  10/26/2019 9:03 PM      HISTORY: Intractable left lumbar back pain.     TECHNIQUE: Axial images of the lumbar spine were obtained without  intravenous contrast. Multiplanar reformations were performed.   Radiation dose for this scan was reduced using automated exposure  control, adjustment of the mA and/or kV according to patient size, or  iterative reconstruction technique.     COMPARISON: None.     FINDINGS:  A small riblet is present at the L1 vertebra on the left  with transverse process on the right. Alignment is significant for  S-shaped curvature of the thoracolumbar spine with dextroconvex  curvature of the thoracolumbar junction. There is also grade 1  retrolisthesis of L1 on L2. Multilevel mild disc height loss is  present. Moderate disc height loss is  present at L1-2 with subchondral  cystic change/Schmorl's node formation at L3-4, L4-5, and L5-S1. These  contribute to multilevel mild spinal canal stenosis. Disc bulge at  L4-5 is asymmetric to the right with uncovertebral osteophyte  contributing to moderate right foraminal stenosis. Disc bulge at L5-S1  probably contacts the bilateral traversing S1 nerve roots within the  lateral recesses (series 5 image 109). Multilevel facet arthropathy is  present. This is moderate bilaterally at L3-4, moderate on the right  at L5-S1, and severe on the left at L5-S1. Paraspinal soft tissues are  unremarkable.                                                                      IMPRESSION:  Mild to moderate degenerative change.        JONNY GOEL MD      ED labs and imaging:  Results for orders placed or performed during the hospital encounter of 10/29/19   Lumbar spine MRI w/o contrast    Narrative    MR LUMBAR SPINE WITHOUT CONTRAST 10/29/2019 4:30 PM     HISTORY: 3-week history of progressive left lower back and flank pain.   See CT imaging of the lumbar spine from October 26, 2019.  Evaluate  for disc herniation versus nerve impingement.    TECHNIQUE: Multiplanar multisequence images were obtained through the  lumbar spine without contrast.    COMPARISON: CT dated 10/26/2019    FINDINGS: 5 lumbar type vertebral bodies are presumed. Posterior  alignment is normal. The conus medullaris is minimally deformed in the  left anterior epidural space at the upper T12 level although this is  not completely visualized on this exam. The conus medullaris tip is at  the L2-3 level. Cauda equina nerve roots are normal. Disc space  narrowing is present L1-2, and L4-5. Bone marrow signal intensity is  normal.    T12 level: There is some abnormal soft tissue in the left anterior  epidural space in the lateral recess. This could represent an epidural  hematoma or an extruded disc protrusion. It is resulting in some  mild-to-moderate  central canal stenosis with impingement upon the left  T12 nerve root in the left lateral recess.    L1-2: Posterior osteophyte formation and disc bulging is present but  there is no stenosis.    L2-3: Normal.    L3-4: Broad-based disc bulge and moderate facet and ligamentum flavum  hypertrophy is present causing mild central canal stenosis and mild  bilateral neural foraminal stenosis.    L4-5: Broad-based disc bulging and facet and ligament flavum  hypertrophy is present. There is also asymmetric right posterior  lateral osteophyte. There is secondary moderate right-sided foraminal  stenosis and mild central canal stenosis.    L5-S1: Posterior osteophyte formation and moderate facet and ligament  flavum hypertrophy is present causing mild central canal stenosis but  no central canal stenosis.      Impression    IMPRESSION:  1. Abnormal masslike lesion in left anterior epidural space posterior  to T12 with impingement upon the left T12 nerve root. This could be  due to an epidural hematoma or an extruded disc protrusion. If  indicated, MRI of the thoracic spine might be helpful in further  evaluation.  2. Degenerative changes in lumbar spine advanced at L4-5 where there  is moderate right-sided foraminal stenosis.    HANNAH SERRATO MD   Thoracic spine MRI w/o contrast    Narrative    MR THORACIC SPINE WITHOUT CONTRAST 10/29/2019 5:23 PM     HISTORY: Abnormal MRI lumbar spine-masslike lesion at T12 suggestive  of an epidural hematoma cannot exclude a disc herniation. Further  detail needed. See Lumbar MRI as advised.    TECHNIQUE: Multiplanar multisequence images were obtained through the  thoracic spine without contrast.    COMPARISON: MRI of the lumbar spine on same date.    FINDINGS: Sagittal images demonstrate normal posterior alignment. The  thoracic cord is slightly indented anterolaterally at T12, otherwise  is normal in morphology and signal characteristics. At the T12 level,  there is a left anterolateral  epidural mass which is causing minor  indentation on the cord. This is isolated to the T12 left anterior  epidural space and is probably an extruded disc protrusion although a  focal epidural hematoma cannot entirely be excluded. Is causing some  impingement upon the left T12 nerve root. There are some small disc  protrusions at several levels in the thoracic spine but none of these  are associated with any significant stenosis. There is mild disc space  narrowing at multiple levels along with mild bridging osteophytes.  Bone marrow signal intensity is within normal limits.      Impression    IMPRESSION: Isolated left anterolateral epidural mass at the T12 level  causing some mild to moderate central canal stenosis. This is not  causing any significant cord impingement or any cord edema. There is  however some impingement on the left T12 nerve root which could  explain the patient's symptoms. This could represent either a  localized epidural hematoma or an extruded disc protrusion. Results  called and discussed with Dr. Jang.    HANNAH SERRATO MD   UA reflex to Microscopic   Result Value Ref Range    Color Urine Yellow     Appearance Urine Clear     Glucose Urine 50 (A) NEG^Negative mg/dL    Bilirubin Urine Negative NEG^Negative    Ketones Urine Negative NEG^Negative mg/dL    Specific Gravity Urine 1.025 1.003 - 1.035    Blood Urine Negative NEG^Negative    pH Urine 5.0 5.0 - 7.0 pH    Protein Albumin Urine Negative NEG^Negative mg/dL    Urobilinogen mg/dL 0.0 0.0 - 2.0 mg/dL    Nitrite Urine Negative NEG^Negative    Leukocyte Esterase Urine Negative NEG^Negative    Source Midstream Urine    CBC with platelets differential   Result Value Ref Range    WBC 18.2 (H) 4.0 - 11.0 10e9/L    RBC Count 4.98 4.4 - 5.9 10e12/L    Hemoglobin 15.1 13.3 - 17.7 g/dL    Hematocrit 44.2 40.0 - 53.0 %    MCV 89 78 - 100 fl    MCH 30.3 26.5 - 33.0 pg    MCHC 34.2 31.5 - 36.5 g/dL    RDW 12.1 10.0 - 15.0 %    Platelet Count 238 150 -  450 10e9/L    Diff Method Automated Method     % Neutrophils 89.5 %    % Lymphocytes 5.3 %    % Monocytes 4.1 %    % Eosinophils 0.3 %    % Basophils 0.2 %    % Immature Granulocytes 0.6 %    Nucleated RBCs 0 0 /100    Absolute Neutrophil 16.3 (H) 1.6 - 8.3 10e9/L    Absolute Lymphocytes 1.0 0.8 - 5.3 10e9/L    Absolute Monocytes 0.8 0.0 - 1.3 10e9/L    Absolute Eosinophils 0.1 0.0 - 0.7 10e9/L    Absolute Basophils 0.0 0.0 - 0.2 10e9/L    Abs Immature Granulocytes 0.1 0 - 0.4 10e9/L    Absolute Nucleated RBC 0.0    Basic metabolic panel   Result Value Ref Range    Sodium 139 133 - 144 mmol/L    Potassium 3.9 3.4 - 5.3 mmol/L    Chloride 106 94 - 109 mmol/L    Carbon Dioxide 26 20 - 32 mmol/L    Anion Gap 7 3 - 14 mmol/L    Glucose 235 (H) 70 - 99 mg/dL    Urea Nitrogen 12 7 - 30 mg/dL    Creatinine 0.85 0.66 - 1.25 mg/dL    GFR Estimate >90 >60 mL/min/[1.73_m2]    GFR Estimate If Black >90 >60 mL/min/[1.73_m2]    Calcium 8.4 (L) 8.5 - 10.1 mg/dL           Assessments & Plan (with Medical Decision Making)   Clinical Impression: 54-year-old male who presented with left flank pain that is worsened over the last 2 days.  He was recently evaluated for acute left-sided low back pain on 2 prior occasions early in the month on October 9, 2019 and again on October 26, 2019.  Patient arrived by private car with his son, reporting persistent pain and discomfort despite his current pain regimen with taking Percocet, Robaxin and Medrol Dosepak.  Pain is likely due to T12 disc herniation cannot exclude an epidural hematoma noted on MRI imaging nerve root impingement at left T12. Urgent follow-up with orthopedic surgery advised.    Patient reported pain is predominantly in the left flank and left lower lateral lumbar area.  No prior history of back surgery or back injury.  He reports no fever no chills.  No history of IV drug use.  He also has no urinary symptoms specifically no urgency or frequency.  No perianal anesthesia.  No  unintentional weight loss.  He reports because he is not scheduled to be seen in clinic until November 4, 2019 with persistent pain and discomfort he arrived for further care.  On my exam reproducible pain in the left lateral back and flank.  Patient was vitally stable his blood pressure on arrival was 147/87, he was afebrile.  Negative straight leg raise.  Equal and symmetric lower extremity strength in both extremities.  NIH stroke score was 0.  GCS was 15.      ED course and Plan:  Reviewed patient's medical records including assessment in clinic in October 2019 and his recent evaluation in the department on October 9 and again on October 26, 2019.  As part of his work-up 3 days earlier he had CT imaging of his abdomen pelvis which that was unremarkable and a CT of his lumbar spine which showed degenerative changes see detail in the report by the interpreting radiologist above.  He was discharged home with Percocet and Robaxin for spasms refractory to NSAIDs.  Outpatient follow-up appointment -orthopedic  referral service was provided.  Appointment is scheduled for November 4, 2019-in sports medicine.  With persistent pain and degree of degenerative changes noted on CT of the lumbar spine completed 3 days prior we discussed next steps for care including additional imaging with MRI to exclude disc herniation or nerve impingement versus other acute process.  He was offered IV Dilaudid for pain control.    His work-up in the department revealed a white count of 18.2.  It is unclear if this secondary to infection as patient has no systemic symptoms.  He is also currently on a Methylprednisolone.  Urinalysis in the department was negative for infection or hematuria.  MRI of the lumbar spine-revealed a masslike lesion at T12 with concern for impingement at the T12 nerve root.  Interpreting radiologist felt the differential include an epidural hematoma and extruded disc protrusion.  An MRI of the thoracic spine  was recommended.  I received a phone call from the interpreting radiologist Dr. Angelito Guerra-at 5:35 PM who reported that localized masslike process of T12 suspect disc herniation cannot exclude an epidural hematoma.  Patient had modest l pain control in the department.  With MRI findings and persistent pain and discomfort I reviewed his presentation and imaging results with orthopedic spine surgery- Dr Mike Segura- on duty for [facility] at 5.50pm.  We discussed patient's presentation MRI findings.  Plan is for an outpatient appointment in the morning (10/30/19).  Dr. Segura team will call the patient for next steps for care.  Based on patient's history with subacute presentation the last 3 weeks patient is not on any anticoagulation did not report any trauma or injury likely that he has an epidural hematoma is low.Based on MRI findings patient symptoms and exam , I favor that his symptoms are likely due to a T12 disc herniation with nerve impingement at T12 nerve root.  I reviewed my consultation with orthopedic spine surgery with the patient.  The patient was comfortable going home with trial of outpatient care pending urgent and close follow-up within 24 hours with orthopedic spine surgery.  He is discharged home with oral Dilaudid for better pain control which he reported provided relief during his ED course of care.  We discussed reasons to return to the department including if he develops weakness or numbness or new concerns including fever.       Disclaimer: This note consists of symbols derived from keyboarding, dictation and/or voice recognition software. As a result, there may be errors in the script that have gone undetected. Please consider this when interpreting information found in this chart.  I have reviewed the nursing notes.    I have reviewed the findings, diagnosis, plan and need for follow up with the patient.       New Prescriptions    HYDROMORPHONE (DILAUDID) 2 MG TABLET    Take 1 tablet (2 mg)  by mouth every 6 hours as needed for pain or moderate to severe pain       Final diagnoses:   Chronic left-sided thoracic back pain - persistent over the last 3 weeks. Due to T12 lesion with impingement at T12 nerve root   Abnormal MRI, lumbar spine - T12 lesion masslike -with nerve impingement T12   Abnormal MRI, thoracic spine - T12 masslike lesions with nerve impingement T12     This document serves as a record of the services and decisions personally performed and made by Sergey Jang. The HPI was created on his behalf by Renetta Rg, a trained medical scribe. The creation of this document is based on the provider's statements to the medical scribe.  Renetta Rg 3:22 PM October 29, 2019    Provider:    The information in this document created by the medical scribe for me, accurately reflects the services I personally performed and the decisions made by me. I have reviewed and approved this document for accuracy prior to leaving the patient care area.  Sergey Jang 3:22 PM October 29, 2019    10/29/2019   [facility] EMERGENCY DEPARTMENT     Sergey Jang MD  10/29/19 8510

## 2019-11-02 ENCOUNTER — HEALTH MAINTENANCE LETTER (OUTPATIENT)
Age: 54
End: 2019-11-02

## 2019-11-04 ENCOUNTER — TRANSFERRED RECORDS (OUTPATIENT)
Dept: HEALTH INFORMATION MANAGEMENT | Facility: CLINIC | Age: 54
End: 2019-11-04

## 2019-11-06 ENCOUNTER — TELEPHONE (OUTPATIENT)
Dept: PALLIATIVE MEDICINE | Facility: CLINIC | Age: 54
End: 2019-11-06

## 2019-11-06 NOTE — TELEPHONE ENCOUNTER
Received 11-6-2019 at 9:37 AM    Incoming fax from [facility] (Danielle Nunes NP) for a left T12 TF MARCELA for radiculopathy (M54.10). Included in fax was their specific site (and provider) to schedule at (with).    Routing to scheduling coordinators.    Please close encounter once it has been scheduled.      Jenn Wendell  Patient Representative  [facility]

## 2019-11-06 NOTE — TELEPHONE ENCOUNTER
Pre-screening Questions for Radiology Injections:    Injection to be done at which interventional clinic site? [facility]    Instruct patient to arrive as directed prior to the scheduled appointment time:    [place] minutes before      [place]: 30 minutes before; if IV needed 1 hour before     Procedure ordered by Des    Procedure ordered? Left T12 TF MARCELA      Transforaminal Cervical MARCELA - Dr. Rajni Robles ONLY    What insurance would patient like us to bill for this procedure? [employer]      Worker's comp or MVA (motor vehicle accident) -Any injection DO NOT SCHEDULE and route to Donna Estes.      [employer] insurance - For SI joint injections, DO NOT SCHEDULE and route Donna Estes.       [employer] - Any injection besides hip/shoulder/knee joint DO NOT SCHEDULE and route to Donna Estes. She will obtain PA and call pt back to schedule procedure or notify pt of denial.       [employer]-Route to Donna for review      **BCBS- ALL need to be routed to [place] for review if a PA is needed**      IF SCHEDULING IN [place] AND NEEDS A PA, IT IS OKAY TO SCHEDULE. [place] HANDLES THEIR OWN PA'S AFTER THE PATIENT IS SCHEDULED. PLEASE SCHEDULE AT LEAST 1 WEEK OUT SO A PA CAN BE OBTAINED.    Any chance of pregnancy? NO   If YES, do NOT schedule and route to RN pool    Is an  needed? No     Patient has a drive home? (mandatory) YES: ok    Is patient taking any blood thinners (plavix, coumadin, jantoven, warfarin, heparin, pradaxa or dabigatran )? No   If hold needed, do NOT schedule, route to RN pool     Is patient taking any aspirin products (includes Excedrin and Fiorinal)? No     If more than 325mg/day do NOT schedule; route to RN pool     For CERVICAL procedures, hold all aspirin products for 6 days.     Tell pt that if aspirin product is not held for 6 days, the procedure WILL BE cancelled.      Does the patient have a bleeding or clotting disorder? No     If YES, okay to schedule AND route to  RN nurse pool    For any patients with platelet count <100, must be forwarded to provider    Is patient diabetic?  No  If YES, instruct them to bring their glucometer.    Does patient have an active infection or treated for one within the past week? No     Is patient currently taking any antibiotics?  No     For patients on chronic, preventative, or prophylactic antibiotics, procedures may be scheduled.     For patients on antibiotics for active or recent infection:antibiotic course must have been completed for 4 days    Is patient currently taking any steroid medications? (i.e. Prednisone, Medrol)  No     For patients on steroid medications, course must have been completed for 4 days    Reviewed with patient:  If you are started on any steroids or antibiotics between now and your appointment, you must contact us because the procedure may need to be cancelled.  Yes    Is patient actively being treated for cancer or immunocompromised? No  If YES, do NOT schedule and route to RN pool     Are you able to get on and off an exam table with minimal or no assistance? Yes  If NO, do NOT schedule and route to RN pool    Are you able to roll over and lay on your stomach with minimal or no assistance? Yes  If NO, do NOT schedule and route to RN pool     Any allergies to contrast dye, iodine, shellfish, or numbing and steroid medications? No  If YES, route to RN pool AND add allergy information to appointment notes    Allergies: Nkda [no known drug allergies]      Has the patient had a flu shot or any other vaccinations within 7 days before or after the procedure.  No     Does patient have an MRI/CT?  Not Applicable  Check Procedure Scheduling Grid to see if required.      Was the MRI done within the last 3 years?  Yes    If yes, where was the MRI done i.e.[facility], [facility], [place], [facility] etc? [facility]       If no, do not schedule and route to RN pool    If MRI was not done at [place], [facility] or [facility] do  NOT schedule and route to RN pool.      If pt has an imaging disc, the injection MAY be scheduled but pt has to bring disc to appt.     If they show up without the disc the injection cannot be done    Reminders (please tell patient if applicable):       Instructed pt to arrive 30 minutes early for IV start if required. (Check Procedure Scheduling Grid)  Not Applicable      If celiac plexus block, informed patient NPO for 6 hours and that it is okay to take medications with sips of water, especially blood pressure medications  Not Applicable         If this is for a cervical procedure, informed patient that aspirin needs to be held for 6 days.   Not Applicable      For all patients not having spinal cord stimulator (SCS) trials or radiofrequency ablations (RFAs), informed patient:    IV sedation is not provided for this procedure.  If you feel that an oral anti-anxiety medication is needed, you can discuss this further with your referring provider or primary care provider.  The Pain Clinic provider will discuss specifics of what the procedure includes at your appointment.  Most procedures last 10-20 minutes.  We use numbing medications to help with any discomfort during the procedure.  Not Applicable      Do not schedule procedures requiring IV placement in the first appointment of the day or first appointment after lunch. Do NOT schedule at 0745, 0815 or 1245.       For patients 85 or older we recommend having an adult stay w/ them for the remainder of the day.       Does the patient have any questions?  NO  Gina Fabian  [facility]

## 2019-11-14 ENCOUNTER — HOSPITAL ENCOUNTER (OUTPATIENT)
Dept: RADIOLOGY | Facility: CLINIC | Age: 54
Discharge: HOME | End: 2019-11-14
Attending: ANESTHESIOLOGY | Admitting: ANESTHESIOLOGY
Payer: COMMERCIAL

## 2019-11-14 ENCOUNTER — MYC MEDICAL ADVICE (OUTPATIENT)
Dept: FAMILY MEDICINE | Facility: CLINIC | Age: 54
End: 2019-11-14

## 2019-11-14 ENCOUNTER — RADIOLOGY INJECTION OFFICE VISIT (OUTPATIENT)
Dept: PALLIATIVE MEDICINE | Facility: CLINIC | Age: 54
End: 2019-11-14
Payer: COMMERCIAL

## 2019-11-14 VITALS — SYSTOLIC BLOOD PRESSURE: 137 MMHG | HEART RATE: 68 BPM | DIASTOLIC BLOOD PRESSURE: 89 MMHG | RESPIRATION RATE: 16 BRPM

## 2019-11-14 DIAGNOSIS — M54.14 THORACIC RADICULOPATHY: ICD-10-CM

## 2019-11-14 DIAGNOSIS — M47.814 SPONDYLOSIS OF THORACIC REGION WITHOUT MYELOPATHY OR RADICULOPATHY: Primary | ICD-10-CM

## 2019-11-14 DIAGNOSIS — M51.34 DDD (DEGENERATIVE DISC DISEASE), THORACIC: ICD-10-CM

## 2019-11-14 PROCEDURE — 25500064 ZZH RX 255 OP 636: Performed by: ANESTHESIOLOGY

## 2019-11-14 PROCEDURE — 25000128 H RX IP 250 OP 636: Performed by: ANESTHESIOLOGY

## 2019-11-14 PROCEDURE — 27210202 XR CERVICAL/THORACIC TRANSFORAMINAL INJ LEFT: Mod: TC

## 2019-11-14 PROCEDURE — 64479 NJX AA&/STRD TFRM EPI C/T 1: CPT | Mod: LT | Performed by: ANESTHESIOLOGY

## 2019-11-14 RX ORDER — BUPIVACAINE HYDROCHLORIDE 5 MG/ML
10 INJECTION, SOLUTION PERINEURAL ONCE
Status: COMPLETED | OUTPATIENT
Start: 2019-11-14 | End: 2019-11-14

## 2019-11-14 RX ORDER — LIDOCAINE HYDROCHLORIDE AND EPINEPHRINE 10; 10 MG/ML; UG/ML
1 INJECTION, SOLUTION INFILTRATION; PERINEURAL ONCE
Status: DISCONTINUED | OUTPATIENT
Start: 2019-11-14 | End: 2019-11-14 | Stop reason: CLARIF

## 2019-11-14 RX ORDER — METHYLPREDNISOLONE ACETATE 40 MG/ML
40 INJECTION, SUSPENSION INTRA-ARTICULAR; INTRALESIONAL; INTRAMUSCULAR; SOFT TISSUE ONCE
Status: COMPLETED | OUTPATIENT
Start: 2019-11-14 | End: 2019-11-14

## 2019-11-14 RX ORDER — DEXAMETHASONE SODIUM PHOSPHATE 10 MG/ML
20 INJECTION, SOLUTION INTRAMUSCULAR; INTRAVENOUS ONCE
Status: COMPLETED | OUTPATIENT
Start: 2019-11-14 | End: 2019-11-14

## 2019-11-14 RX ORDER — LIDOCAINE HYDROCHLORIDE 10 MG/ML
5 INJECTION, SOLUTION EPIDURAL; INFILTRATION; INTRACAUDAL; PERINEURAL ONCE
Status: COMPLETED | OUTPATIENT
Start: 2019-11-14 | End: 2019-11-14

## 2019-11-14 RX ADMIN — DEXAMETHASONE SODIUM PHOSPHATE 5 MG: 10 INJECTION, SOLUTION INTRAMUSCULAR; INTRAVENOUS at 08:01

## 2019-11-14 RX ADMIN — IOHEXOL 1 ML: 300 INJECTION, SOLUTION INTRAVENOUS at 08:01

## 2019-11-14 RX ADMIN — BUPIVACAINE HYDROCHLORIDE 1 ML: 5 INJECTION, SOLUTION EPIDURAL; INTRACAUDAL at 08:01

## 2019-11-14 RX ADMIN — METHYLPREDNISOLONE ACETATE 40 MG: 40 INJECTION, SUSPENSION INTRA-ARTICULAR; INTRALESIONAL; INTRAMUSCULAR; SOFT TISSUE at 08:01

## 2019-11-14 RX ADMIN — LIDOCAINE HYDROCHLORIDE 1 ML: 10 INJECTION, SOLUTION EPIDURAL; INFILTRATION; INTRACAUDAL; PERINEURAL at 08:01

## 2019-11-14 ASSESSMENT — PAIN SCALES - GENERAL
PAINLEVEL: MILD PAIN (3)
PAINLEVEL: SEVERE PAIN (7)

## 2019-11-14 NOTE — IP AVS SNAPSHOT
MRN:[id number]                      After Visit Summary   11/14/2019    Daniel Schultz    MRN: [id number]           Visit Information        Provider Department      11/14/2019  7:45 AM [facility]           Review of your medicines      UNREVIEWED medicines. Ask your doctor about these medicines       Dose / Directions   HYDROmorphone 2 MG tablet  Commonly known as:  DILAUDID  Ask about: Should I take this medication?      Dose:  2 mg  Take 1 tablet (2 mg) by mouth every 6 hours as needed for pain or moderate to severe pain  Quantity:  10 tablet  Refills:  0     methocarbamol 750 MG tablet  Commonly known as:  ROBAXIN      1 tab by mouth every 4 hrs. or 2 tabs every 8 hrs. as needed for muscle spasm.  Quantity:  40 tablet  Refills:  0     methylPREDNISolone 4 MG tablet therapy pack  Commonly known as:  MEDROL DOSEPAK  Used for:  Acute left-sided low back pain with left-sided sciatica      Follow Package Directions  Quantity:  21 tablet  Refills:  0     sertraline 50 MG tablet  Commonly known as:  ZOLOFT  Used for:  Moderate single current episode of major depressive disorder (H), JOSE ALFREDO (generalized anxiety disorder)      Dose:  150 mg  Take 3 tablets (150 mg) by mouth daily  Quantity:  270 tablet  Refills:  3              Protect others around you: Learn how to safely use, store and throw away your medicines at www.disposemymeds.org.       Follow-ups after your visit       Care Instructions       Further instructions from your care team       [facility]   Procedure Discharge Instructions    Today you saw:    Dr. Daniel Robles     You had a:   Thoracic Transforaminal Epidural Steroid Injection Left T12-L1    Medications used:  Lidocaine   Bupivacaine   Dexamethasone   Depo-medrol   Omnipaque   Lidocaine with Epinephrine      Be cautious when walking. Numbness and/or weakness in the lower extremities may occur for up to 6-8 hours after the  procedure due to effect of the local anesthetic    Do not drive for 6 hours. The effect of the local anesthetic could slow your reflexes.     You may resume your regular activities after 24 hours    Avoid strenuous activity for the first 24 hours    You may shower, however avoid swimming, tub baths or hot tubs for 24 hours following your procedure    You may have a mild to moderate increase in pain for several days following the injection.    It may take up to 14 days for the steroid medication to start working although you may feel the effect as early as a few days after the procedure.       You may use ice packs for 10-15 minutes, 3 to 4 times a day at the injection site for comfort    Do not use heat to painful areas for 6 to 8 hours. This will give the local anesthetic time to wear off and prevent you from accidentally burning your skin.     Unless you have been directed to avoid the use of anti-inflammatory medications (NSAIDS), you may use medications such as ibuprofen, Aleve or Tylenol for pain control if needed.     If you were fasting, you may resume your normal diet and medications after the procedure    Possible side effects of steroids that you may experience include flushing, elevated blood pressure, increased appetite, mild headaches and restlessness.  All of these symptoms will get better with time.    If you experience any of the following, call the Pain Clinic during work hours (Mon-Friday 8-4:30 pm) at [phone] or the Provider Line after hours at [phone]:  -Fever over 100 degree F  -Swelling, bleeding, redness, drainage, warmth at the injection site  -Progressive weakness or numbness in your legs  -Loss of bowel or bladder function  -Unusual new onset of pain that is not improving          Additional Information About Your Visit       [employer] Information    [employer] gives you secure access to your electronic health record. If you see a primary care provider, you can also send messages to  your care team and make appointments. If you have questions, please call your primary care clinic.  If you do not have a primary care provider, please call [phone] and they will assist you.       Care EveryWhere ID    This is your Care EveryWhere ID. This could be used by other organizations to access your [facility] medical records  [id number]        Primary Care Provider Office Phone # Fax #    No Fierro,  [phone]      Equal Access to Services    JUVE OLSON : Hadii rosalio ku hadasho Soomaali, waaxda luqadaha, qaybta kaalmada adeegyada, ysabel pickering . So [facility] [phone].    ATENCIÓN: Si habla español, tiene a liu disposición servicios gratuitos de asistencia lingüística. Llame al [phone].    We comply with applicable federal civil rights laws and [place] laws. We do not discriminate on the basis of race, color, national origin, age, disability, sex, sexual orientation, or gender identity.           Thank you!    Thank you for choosing [facility] for your care. Our goal is always to provide you with excellent care. Hearing back from our patients is one way we can continue to improve our services. Please take a few minutes to complete the written survey that you may receive in the mail after you visit with us. Thank you!            Medication List      ASK your doctor about these medications          Morning Afternoon Evening Bedtime As Needed    HYDROmorphone 2 MG tablet  Also known as:  DILAUDID  INSTRUCTIONS:  Take 1 tablet (2 mg) by mouth every 6 hours as needed for pain or moderate to severe pain  Ask about: Should I take this medication?                     methocarbamol 750 MG tablet  Also known as:  ROBAXIN  INSTRUCTIONS:  1 tab by mouth every 4 hrs. or 2 tabs every 8 hrs. as needed for muscle spasm.                     methylPREDNISolone 4 MG tablet therapy pack  Also known as:  MEDROL DOSEPAK  INSTRUCTIONS:  Follow Package Directions                      sertraline 50 MG tablet  Also known as:  ZOLOFT  INSTRUCTIONS:  Take 3 tablets (150 mg) by mouth daily

## 2019-11-14 NOTE — LETTER
November 14, 2019      Daniel AARON Antoine  [address] [address]        To Whom It May Concern:    Daniel AARON Antoine is under my medical supervision.  Daniel missed work from 10/24/2019-11/10/2019 due to back pain.  Please feel free to contact me with questions at [phone].    Sincerely,        No Fierro, DO

## 2019-11-14 NOTE — IP AVS SNAPSHOT
[facility]  [address] [place]  [facility] [address]  Phone:  [phone]  Fax:  [phone]                                    After Visit Summary   11/14/2019    Daniel Schultz    MRN: [id number]           After Visit Summary Signature Page    I have received my discharge instructions, and my questions have been answered. I have discussed any challenges I see with this plan with the nurse or doctor.    ..........................................................................................................................................  Patient/Patient Representative Signature      ..........................................................................................................................................  Patient Representative Print Name and Relationship to Patient    ..................................................               ................................................  Date                                   Time    ..........................................................................................................................................  Reviewed by Signature/Title    ...................................................              ..............................................  Date                                               Time          22EPIC Rev 08/18

## 2019-11-14 NOTE — PROGRESS NOTES
Pre procedure Diagnosis: thoracic radiculopathy, thoracic degenerative disc disease   Post procedure Diagnosis: Same  Procedure performed: thoracic transforaminal epidural steroid injection at T12-L1 on the left, fluoroscopically guided, contrast controlled  Anesthesia: none  Complications: none  Operators: Daniel Robles MD     Indications:   Daniel Schultz is a 54 year old male was sent by Danielle Nunes NP for thoracic epidural steroid injection.  They have a history of chronic left lower thoracic and upper abdominal pain.  Exam shows lumbar tenderness and intercostal tenderness on the left and they have tried conservative treatment including physical therapy, medications.    MRI was done on 10/29/19 which showed   FINDINGS: Sagittal images demonstrate normal posterior alignment. The  thoracic cord is slightly indented anterolaterally at T12, otherwise  is normal in morphology and signal characteristics. At the T12 level,  there is a left anterolateral epidural mass which is causing minor  indentation on the cord. This is isolated to the T12 left anterior  epidural space and is probably an extruded disc protrusion although a  focal epidural hematoma cannot entirely be excluded. Is causing some  impingement upon the left T12 nerve root. There are some small disc  protrusions at several levels in the thoracic spine but none of these  are associated with any significant stenosis. There is mild disc space  narrowing at multiple levels along with mild bridging osteophytes.  Bone marrow signal intensity is within normal limits.                                                                    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 This is not  causing any significant cord impingement or any cord edema. There is  however some impingement on the left T12 nerve root which could  explain the patient's symptoms. This could  represent either a  localized epidural hematoma or an extruded disc protrusion. Results  called and discussed with Dr. Jang.     HANNAH SERRATO MD    Options/alternatives, benefits and risks were discussed with the patient including bleeding, infection, tissue trauma, numbness, weakness, paralysis, spinal cord injury, radiation exposure, headache and reaction to medications. Questions were answered to his satisfaction and he agrees to proceed. Voluntary informed consent was obtained and signed.     Vitals were reviewed: Yes  /89   Pulse 68   Resp 16   Allergies were reviewed:  Yes   Medications were reviewed:  Yes   Pre-procedure pain score: 7/10    Procedure:  After getting informed consent, patient was brought into the procedure suite and was placed in a prone position on the procedure table.   A Pause for the Cause was performed.  Patient was prepped and draped in sterile fashion.     After identifying the left T12-L1 neuroforamen, the C-arm was rotated to a left lateral oblique angle.  A total of 1.5 ml of Lidocaine 1% was used to anesthetize the skin and the needle track at a skin entry site coaxial with the fluoroscopy beam, and overriding the superior aspect of the neuroforamen.  A 27 gauge 3.5 inch spinal needle was advanced under intermittent fluoroscopy until it entered the foramen superiorly.    The needle position was then inspected from anteroposterior and lateral views, and the needle adjusted appropriately.  A total of 1 ml of Omnipaque-300 was injected, confirming appropriate position, with spread into the nerve root sheath and the epidural space, with no intravascular uptake. 9 ml was wasted    Then, after repeated negative aspiration, 2.5 ml of a combination of Depomedrol 40 mg, Decadron 5 mg, 0.5% bupivacaine 1 ml was injected and the needle was flushed with lidocaine and removed.    During the procedure, there was not a paresthesia.  Hemostasis was achieved, the area was cleaned, and  bandaids were placed when appropriate.  The patient tolerated the procedure well, and was taken to the recovery room.    Images were saved to PACS.    Post-procedure pain score: 3/10  Follow-up includes:   -f/u phone call in one week  -f/u with referring provider    Daniel Robles MD  [facility]

## 2019-11-14 NOTE — TELEPHONE ENCOUNTER
Please see mychart.  Letter pended.    CSS, please fax to [phone].    Routing to provider.  Skylar PARIS RN

## 2019-11-14 NOTE — DISCHARGE INSTRUCTIONS
[facility]   Procedure Discharge Instructions    Today you saw:    Dr. Daniel Robles     You had a:   Thoracic Transforaminal Epidural Steroid Injection Left T12-L1    Medications used:  Lidocaine   Bupivacaine   Dexamethasone   Depo-medrol   Omnipaque   Lidocaine with Epinephrine      Be cautious when walking. Numbness and/or weakness in the lower extremities may occur for up to 6-8 hours after the procedure due to effect of the local anesthetic    Do not drive for 6 hours. The effect of the local anesthetic could slow your reflexes.     You may resume your regular activities after 24 hours    Avoid strenuous activity for the first 24 hours    You may shower, however avoid swimming, tub baths or hot tubs for 24 hours following your procedure    You may have a mild to moderate increase in pain for several days following the injection.    It may take up to 14 days for the steroid medication to start working although you may feel the effect as early as a few days after the procedure.       You may use ice packs for 10-15 minutes, 3 to 4 times a day at the injection site for comfort    Do not use heat to painful areas for 6 to 8 hours. This will give the local anesthetic time to wear off and prevent you from accidentally burning your skin.     Unless you have been directed to avoid the use of anti-inflammatory medications (NSAIDS), you may use medications such as ibuprofen, Aleve or Tylenol for pain control if needed.     If you were fasting, you may resume your normal diet and medications after the procedure    Possible side effects of steroids that you may experience include flushing, elevated blood pressure, increased appetite, mild headaches and restlessness.  All of these symptoms will get better with time.    If you experience any of the following, call the Pain Clinic during work hours (Mon-Friday 8-4:30 pm) at [phone] or the Provider Line after hours at  [phone]:  -Fever over 100 degree F  -Swelling, bleeding, redness, drainage, warmth at the injection site  -Progressive weakness or numbness in your legs  -Loss of bowel or bladder function  -Unusual new onset of pain that is not improving

## 2019-11-21 ENCOUNTER — TELEPHONE (OUTPATIENT)
Dept: PALLIATIVE MEDICINE | Facility: CLINIC | Age: 54
End: 2019-11-21

## 2019-11-21 NOTE — TELEPHONE ENCOUNTER
Patient had a thoracic transforaminal epidural steroid injection at T12-L1 on the left, fluoroscopically guided, contrast controlled on 11/14/19.  Called patient for an update.      Left message that we were calling for an update about how s/he was doing after the injection.  LM that if s/he has any problems or questions to call the clinic at [phone].

## 2019-12-04 ENCOUNTER — TRANSFERRED RECORDS (OUTPATIENT)
Dept: HEALTH INFORMATION MANAGEMENT | Facility: CLINIC | Age: 54
End: 2019-12-04

## 2020-07-21 ENCOUNTER — TRANSFERRED RECORDS (OUTPATIENT)
Dept: HEALTH INFORMATION MANAGEMENT | Facility: CLINIC | Age: 55
End: 2020-07-21

## 2020-09-21 ENCOUNTER — TRANSFERRED RECORDS (OUTPATIENT)
Dept: HEALTH INFORMATION MANAGEMENT | Facility: CLINIC | Age: 55
End: 2020-09-21

## 2020-09-21 ENCOUNTER — OFFICE VISIT (OUTPATIENT)
Dept: LAB | Facility: SCHOOL | Age: 55
End: 2020-09-21
Payer: COMMERCIAL

## 2020-09-21 VITALS
WEIGHT: 210 LBS | SYSTOLIC BLOOD PRESSURE: 126 MMHG | HEIGHT: 71 IN | TEMPERATURE: 98.6 F | OXYGEN SATURATION: 97 % | BODY MASS INDEX: 29.4 KG/M2 | DIASTOLIC BLOOD PRESSURE: 80 MMHG | RESPIRATION RATE: 146 BRPM | HEART RATE: 93 BPM

## 2020-09-21 DIAGNOSIS — Z23 NEED FOR PROPHYLACTIC VACCINATION AND INOCULATION AGAINST INFLUENZA: ICD-10-CM

## 2020-09-21 DIAGNOSIS — Z00.00 WELLNESS EXAMINATION: Primary | ICD-10-CM

## 2020-09-21 PROCEDURE — 90471 IMMUNIZATION ADMIN: CPT

## 2020-09-21 PROCEDURE — 99213 OFFICE O/P EST LOW 20 MIN: CPT | Mod: 25

## 2020-09-21 PROCEDURE — 90682 RIV4 VACC RECOMBINANT DNA IM: CPT

## 2020-09-21 ASSESSMENT — MIFFLIN-ST. JEOR: SCORE: 1809.68

## 2020-09-21 NOTE — PROGRESS NOTES
"  SUBJECTIVE:  CC: Daniel Schultz is an 55 year old woman who presents for Wellness Check at [facility].     Review of Healthy Lifestyle:    Do you get at least three servings of calcium containing foods daily (dairy, green leafy vegetables, etc.)? yes     Do you have a high-fiber diet? yes     Amount of exercise or daily activities, outside of work: 7 day(s) per week, swimming pool    Do you wear sunscreen on a regular basis? Yes 30-50     Are you taking your medications regularly Yes    Have you had an eye exam in the past two years? yes    Do you see a dentist twice per year? yes    Do you have sleep apnea, excessive snoring or excessive daytime drowsiness? no    Do you use tobacco in any form? no       OBJECTIVE:    Vitals: /80 (BP Location: Right arm, Patient Position: Sitting, Cuff Size: Adult Large)   Pulse 93   Temp 98.6  F (37  C) (Tympanic)   Resp (!) 146   Ht 1.803 m (5' 11\")   Wt 95.3 kg (210 lb)   SpO2 97%   BMI 29.29 kg/m    BMI= Body mass index is 29.29 kg/m .    HEARING: Right Ear:        500Hz:  RESPONSE- on Level:   20 db    1000 Hz: RESPONSE- on Level: 25 db   2000 Hz: RESPONSE- on Level: tone not heard   4000 Hz: RESPONSE- on Level: tone not heard    Left Ear:       500Hz:  RESPONSE- on Level:   20 db    1000 Hz: RESPONSE- on Level:   20 db    2000 Hz: RESPONSE- on Level:   20 db    4000 Hz: RESPONSE- on Level:   20 db     VISION: patient has seen ey doctor is past 2 years    Medication Reconciliation: complete    ASSESSMENT/PLAN:    COUNSELING:      reports that he has never smoked. He has never used smokeless tobacco.    Estimated body mass index is 29.29 kg/m  as calculated from the following:    Height as of this encounter: 1.803 m (5' 11\").    Weight as of this encounter: 95.3 kg (210 lb).   Weight management plan: Discussed healthy diet and exercise guidelines    Counseling Resources  [employer]  [email or web address]    [place] [employer] " PROVIDER  [facility]

## 2020-09-21 NOTE — PATIENT INSTRUCTIONS
"                Daniel Schultz has completed a Wellness Check at the [facility] on 9/21/2020.      ____________________________________________  [place] [employer] PROVID[employer]                                                                               Wellness Visit Recommendations:      See your regular primary care health provider every year in order to help stay healthy.    Review health changes.     Review your medicines if your doctor has prescribed any.    Talk to your provider about how often to have your cholesterol checked.    If you are at risk for diabetes, you should have a diabetes test (fasting glucose).    Shots: Get a flu shot each year. Get a tetanus shot every 10 years.     Review with your primary care provider other immunizations that you may need based on your age and/or medical/surgical history    Nutrition:     Eat at least 5 servings of fruits and vegetables each day.    Eat whole-grain bread, whole-wheat pasta and brown rice instead of white grains and rice.    Preventive Care to be reviewed by your primary care provider:      Males:             Prostrate Cancer Screening                          Colon Cancer Screening      Lifestyle:    Exercise at least 150 minutes a week (30 minutes a day, 5 days of the week). This will help you control your weight and help prevent disease or manage disease.    Limit alcohol to one drink per day or less depending on your past medical history.    No smoking.     Wear sunscreen to prevent skin cancer.    See your dentist every six months for an exam and cleaning.    Today's Vital Signs:  /80 (BP Location: Right arm, Patient Position: Sitting, Cuff Size: Adult Large)   Pulse 93   Temp 98.6  F (37  C) (Tympanic)   Resp (!) 146   Ht 1.803 m (5' 11\")   Wt 95.3 kg (210 lb)   SpO2 97%   BMI 29.29 kg/m    "

## 2020-09-28 ENCOUNTER — VIRTUAL VISIT (OUTPATIENT)
Dept: FAMILY MEDICINE | Facility: CLINIC | Age: 55
End: 2020-09-28
Payer: COMMERCIAL

## 2020-09-28 DIAGNOSIS — R68.81 EARLY SATIETY: ICD-10-CM

## 2020-09-28 DIAGNOSIS — Z12.5 SCREENING FOR PROSTATE CANCER: ICD-10-CM

## 2020-09-28 DIAGNOSIS — R63.4 ABNORMAL WEIGHT LOSS: Primary | ICD-10-CM

## 2020-09-28 PROCEDURE — 99214 OFFICE O/P EST MOD 30 MIN: CPT | Mod: 95 | Performed by: FAMILY MEDICINE

## 2020-09-28 NOTE — PROGRESS NOTES
"Daniel Schultz is a 55 year old male who is being evaluated via a billable video visit.      The patient has been notified of following:     \"This video visit will be conducted via a call between you and your physician/provider. We have found that certain health care needs can be provided without the need for an in-person physical exam.  This service lets us provide the care you need with a video conversation.  If a prescription is necessary we can send it directly to your pharmacy.  If lab work is needed we can place an order for that and you can then stop by our lab to have the test done at a later time.    Video visits are billed at different rates depending on your insurance coverage.  Please reach out to your insurance provider with any questions.    If during the course of the call the physician/provider feels a video visit is not appropriate, you will not be charged for this service.\"  Text link to [phone].  Patient has given verbal consent for Video visit? Yes  How would you like to obtain your AVS? MyChart  If you are dropped from the video visit, the video invite should be resent to: [phone]  Will anyone else be joining your video visit? No    Subjective   Chief Complaint   Patient presents with     Weight Loss     Pt has lost 30lbs over the past month.  Loss of appetite.       Daniel Schultz is a 55 year old male who presents today via video visit for the following health issues:    HPI    Concern - weight loss, decrease in appetite  Onset: weight loss of 30 lbs over the past month, loss of appetite 1-2 wks  Description: Pt had 1 episode of vomiting 2 wk ago, missed whole week of work due to not feeling good.  Stomach feels gurgly and upset, does not feel hungry, no appetite.   Patient reports on some nights, he gets nigthsweats.  Patient states he has not had any abdominal pain, bowel movement change, urinary symptoms, hematuria, hematochezia, melena, jaundice, pallor, cough or " headaches  Progression of Symptoms:  Pt did try eating last night, stomach still feels upset.   Accompanying Signs & Symptoms: Pt feels tired, some heartburn, occasional sweats.  No fever, diarrhea, cold symptoms.   Previous history of similar problem: none  Precipitating factors:        Worsened by: none  Alleviating factors:        Improved by: none  Therapies tried and outcome:  tums for heartburn  Starting weight 235-240, down to 200lbs.      Benign colonoscopy in 2016.  No abnormal finding on CT abdomen/pelvis in 2019.  Review of labs showed patient having hyperglycemia in his ER visit in October 2019  - no follow up noted in the chart.    Video Start Time: 9:45 AM    .  Patient Active Problem List   Diagnosis     Hyperlipidemia LDL goal <160     Meniscus tear     Nonalcoholic steatohepatitis ([place])     Pre-diabetes     JAZMIN (obstructive sleep apnea)     Moderate single current episode of major depressive disorder (H)     JOSE ALFREDO (generalized anxiety disorder)     Past Surgical History:   Procedure Laterality Date     ARTHROSCOPY ANKLE, OPEN REPAIR LIGAMENT, COMBINED Right 1/5/2017    Procedure: COMBINED ARTHROSCOPY ANKLE, OPEN REPAIR LIGAMENT;  Surgeon: Avinash Dick DPM;  Location: [facility]     ARTHROSCOPY KNEE IRRIGATION AND DEBRIDEMENT  9/27/2013    Procedure: ARTHROSCOPY KNEE IRRIGATION AND DEBRIDEMENT;;  Surgeon: Ley, Jeffrey Duane, MD;  Location: [facility]     ARTHROSCOPY KNEE WITH MEDIAL MENISCECTOMY  9/27/2013    Procedure: ARTHROSCOPY KNEE WITH MEDIAL MENISCECTOMY;  Right Knee Arthroscopy Partial Menisectomy and Debridement,Left Knee Arthroscopy Debridement and Loose Body Removal--needs assist.;  Surgeon: Ley, Jeffrey Duane, MD;  Location: [facility]     COLONOSCOPY N/A 8/17/2016    Procedure: COLONOSCOPY;  Surgeon: Rudolph Nazario MD;  Location: [facility]     HEMORRHOIDECTOMY  2000    Internal and external hemorrhoidectomy     REPAIR TENDON ANKLE Right 1/25/2018    Procedure: REPAIR TENDON ANKLE;  Right  Ankle Posterior Tibial Tendon Repair,Peroneus Brevis Repair, Medial Ligament Repair;  Surgeon: Avinash Dick DPM;  Location: [facility]     REPAIR TENDON FOOT Right 4/26/2018    Procedure: REPAIR TENDON FOOT;  Right insertional posterior tibial tendon repair and tendon transfer.;  Surgeon: Avinash Dick DPM;  Location: [facility]     SURGICAL HISTORY OF -   6/2001    Left Achilles tendon repair       Social History     Tobacco Use     Smoking status: Never Smoker     Smokeless tobacco: Never Used   Substance Use Topics     Alcohol use: Yes     Alcohol/week: 0.0 standard drinks     Comment: Occasional     Family History   Problem Relation Age of Onset     Aneurysm Mother      Depression Father          Current Outpatient Medications   Medication Sig Dispense Refill     sertraline (ZOLOFT) 50 MG tablet Take 3 tablets (150 mg) by mouth daily 270 tablet 3     Allergies   Allergen Reactions     Nkda [No Known Drug Allergies]        Review of Systems   Constitutional, HEENT, cardiovascular, pulmonary, GI, , musculoskeletal, neuro, skin, endocrine and psych systems are negative, except as otherwise noted.      Objective           Vitals:  No vitals were obtained today due to virtual visit.    Physical Exam     GENERAL: Healthy, alert and no distress  EYES: Eyes grossly normal to inspection.  No discharge or erythema, or obvious scleral/conjunctival abnormalities.  RESP: No audible wheeze, cough, or visible cyanosis.  No visible retractions or increased work of breathing.    SKIN: Visible skin clear. No significant rash, abnormal pigmentation or lesions.  NEURO: Cranial nerves grossly intact.  Mentation and speech appropriate for age.  PSYCH: Mentation appears normal, affect normal/bright, judgement and insight intact, normal speech and appearance well-groomed.      No results found for this or any previous visit (from the past 24 hour(s)).        Assessment & Plan     Daniel was seen today for weight  loss.    Diagnoses and all orders for this visit:    Abnormal weight loss  -     CBC with platelets differential; Future  -     HIV Antigen Antibody Combo; Future  -     Hemoglobin A1c; Future  -     Comprehensive metabolic panel; Future  -     TSH with free T4 reflex; Future  -     UA with Microscopic reflex to Culture; Future  -     Lipase; Future  -     Prostate spec antigen screen; Future  -     CT Abdomen Pelvis w Contrast; Future    Early satiety  -     CBC with platelets differential; Future  -     HIV Antigen Antibody Combo; Future  -     Hemoglobin A1c; Future  -     Comprehensive metabolic panel; Future  -     TSH with free T4 reflex; Future  -     UA with Microscopic reflex to Culture; Future  -     Lipase; Future  -     Prostate spec antigen screen; Future  -     CT Abdomen Pelvis w Contrast; Future    Screening for prostate cancer  -     Prostate spec antigen screen; Future      Patient has had a significant weight loss for the last month.  No clear reason for this by history.  Does have GI symptoms.   Differential diagnoses are vast: esophageal/gastric/intestinal neoplasm/obstruction, hematologic malignancy, diabetes, occult infection, pancreatic neoplasm, renal neoplasm, prostate neoplasm, other occult neoplasm, occult infection (HIV).  Patient was advised this video visit is not able to fully evaluate him but initial testing can be ordered. He concurred.  Patient will be scheduled for clinic visit when results are out.  Encouraged healthy nutrition.    This visit was changed from in-clinic to virtual visit due to patient's spouse being tested for Covid yesterday and result not out yet. Patient denies Covid symptoms today.       Patient Instructions   Schedule lab appointment for blood and urine tests.    To schedule the CT scan of the abdomen and pelvis, call [phone].    Further recommendations will be given once results are out.        Return in about 1 week (around 10/5/2020) for or sooner  depending on test results.    Fidel Hernandez MD  [facility]      Video-Visit Details    Type of service:  Video Visit    Video End Time:9:59 AM    Originating Location (pt. Location): Home    Distant Location (provider location):  [facility]     Platform used for Video Visit: Louis

## 2020-09-28 NOTE — PATIENT INSTRUCTIONS
Schedule lab appointment for blood and urine tests.    To schedule the CT scan of the abdomen and pelvis, call [phone].    Further recommendations will be given once results are out.

## 2020-09-29 DIAGNOSIS — R68.81 EARLY SATIETY: ICD-10-CM

## 2020-09-29 DIAGNOSIS — R63.4 ABNORMAL WEIGHT LOSS: ICD-10-CM

## 2020-09-29 DIAGNOSIS — Z12.5 SCREENING FOR PROSTATE CANCER: ICD-10-CM

## 2020-09-29 LAB
ALBUMIN SERPL-MCNC: 3.8 G/DL (ref 3.4–5)
ALBUMIN UR-MCNC: NEGATIVE MG/DL
ALP SERPL-CCNC: 78 U/L (ref 40–150)
ALT SERPL W P-5'-P-CCNC: 48 U/L (ref 0–70)
ANION GAP SERPL CALCULATED.3IONS-SCNC: 4 MMOL/L (ref 3–14)
APPEARANCE UR: CLEAR
AST SERPL W P-5'-P-CCNC: 21 U/L (ref 0–45)
BASOPHILS # BLD AUTO: 0 10E9/L (ref 0–0.2)
BASOPHILS NFR BLD AUTO: 0.4 %
BILIRUB SERPL-MCNC: 0.6 MG/DL (ref 0.2–1.3)
BILIRUB UR QL STRIP: NEGATIVE
BUN SERPL-MCNC: 9 MG/DL (ref 7–30)
CALCIUM SERPL-MCNC: 8.8 MG/DL (ref 8.5–10.1)
CHLORIDE SERPL-SCNC: 105 MMOL/L (ref 94–109)
CO2 SERPL-SCNC: 27 MMOL/L (ref 20–32)
COLOR UR AUTO: YELLOW
CREAT SERPL-MCNC: 1.1 MG/DL (ref 0.66–1.25)
DIFFERENTIAL METHOD BLD: NORMAL
EOSINOPHIL # BLD AUTO: 0.3 10E9/L (ref 0–0.7)
EOSINOPHIL NFR BLD AUTO: 3.4 %
ERYTHROCYTE [DISTWIDTH] IN BLOOD BY AUTOMATED COUNT: 12.4 % (ref 10–15)
GFR SERPL CREATININE-BSD FRML MDRD: 75 ML/MIN/{1.73_M2}
GLUCOSE SERPL-MCNC: 104 MG/DL (ref 70–99)
GLUCOSE UR STRIP-MCNC: NEGATIVE MG/DL
HBA1C MFR BLD: 6.2 % (ref 0–5.6)
HCT VFR BLD AUTO: 46.8 % (ref 40–53)
HGB BLD-MCNC: 16.1 G/DL (ref 13.3–17.7)
HGB UR QL STRIP: NEGATIVE
HIV 1+2 AB+HIV1 P24 AG SERPL QL IA: NONREACTIVE
KETONES UR STRIP-MCNC: NEGATIVE MG/DL
LEUKOCYTE ESTERASE UR QL STRIP: NEGATIVE
LIPASE SERPL-CCNC: 125 U/L (ref 73–393)
LYMPHOCYTES # BLD AUTO: 1.6 10E9/L (ref 0.8–5.3)
LYMPHOCYTES NFR BLD AUTO: 21 %
MCH RBC QN AUTO: 31 PG (ref 26.5–33)
MCHC RBC AUTO-ENTMCNC: 34.4 G/DL (ref 31.5–36.5)
MCV RBC AUTO: 90 FL (ref 78–100)
MONOCYTES # BLD AUTO: 0.6 10E9/L (ref 0–1.3)
MONOCYTES NFR BLD AUTO: 8.4 %
MUCOUS THREADS #/AREA URNS LPF: PRESENT /LPF
NEUTROPHILS # BLD AUTO: 5.1 10E9/L (ref 1.6–8.3)
NEUTROPHILS NFR BLD AUTO: 66.8 %
NITRATE UR QL: NEGATIVE
PH UR STRIP: 7.5 PH (ref 5–7)
PLATELET # BLD AUTO: 238 10E9/L (ref 150–450)
POTASSIUM SERPL-SCNC: 3.9 MMOL/L (ref 3.4–5.3)
PROT SERPL-MCNC: 7.4 G/DL (ref 6.8–8.8)
PSA SERPL-ACNC: 1.47 UG/L (ref 0–4)
RBC # BLD AUTO: 5.19 10E12/L (ref 4.4–5.9)
RBC #/AREA URNS AUTO: ABNORMAL /HPF
SODIUM SERPL-SCNC: 136 MMOL/L (ref 133–144)
SOURCE: ABNORMAL
SP GR UR STRIP: 1.01 (ref 1–1.03)
TSH SERPL DL<=0.005 MIU/L-ACNC: 0.89 MU/L (ref 0.4–4)
UROBILINOGEN UR STRIP-ACNC: 0.2 EU/DL (ref 0.2–1)
WBC # BLD AUTO: 7.6 10E9/L (ref 4–11)
WBC #/AREA URNS AUTO: ABNORMAL /HPF

## 2020-09-29 PROCEDURE — 83036 HEMOGLOBIN GLYCOSYLATED A1C: CPT | Performed by: FAMILY MEDICINE

## 2020-09-29 PROCEDURE — 81001 URINALYSIS AUTO W/SCOPE: CPT | Performed by: FAMILY MEDICINE

## 2020-09-29 PROCEDURE — 80050 GENERAL HEALTH PANEL: CPT | Performed by: FAMILY MEDICINE

## 2020-09-29 PROCEDURE — 36415 COLL VENOUS BLD VENIPUNCTURE: CPT | Performed by: FAMILY MEDICINE

## 2020-09-29 PROCEDURE — 87389 HIV-1 AG W/HIV-1&-2 AB AG IA: CPT | Performed by: FAMILY MEDICINE

## 2020-09-29 PROCEDURE — 83690 ASSAY OF LIPASE: CPT | Performed by: FAMILY MEDICINE

## 2020-09-29 PROCEDURE — G0103 PSA SCREENING: HCPCS | Performed by: FAMILY MEDICINE

## 2020-10-01 ENCOUNTER — HOSPITAL ENCOUNTER (OUTPATIENT)
Dept: CT IMAGING | Facility: CLINIC | Age: 55
Discharge: HOME | End: 2020-10-01
Attending: FAMILY MEDICINE | Admitting: FAMILY MEDICINE
Payer: COMMERCIAL

## 2020-10-01 ENCOUNTER — MYC MEDICAL ADVICE (OUTPATIENT)
Dept: FAMILY MEDICINE | Facility: CLINIC | Age: 55
End: 2020-10-01

## 2020-10-01 DIAGNOSIS — R63.4 ABNORMAL WEIGHT LOSS: ICD-10-CM

## 2020-10-01 DIAGNOSIS — R68.81 EARLY SATIETY: ICD-10-CM

## 2020-10-01 DIAGNOSIS — R63.4 ABNORMAL WEIGHT LOSS: Primary | ICD-10-CM

## 2020-10-01 PROCEDURE — 250N000009 HC RX 250: Performed by: RADIOLOGY

## 2020-10-01 PROCEDURE — 250N000011 HC RX IP 250 OP 636: Performed by: RADIOLOGY

## 2020-10-01 PROCEDURE — 74177 CT ABD & PELVIS W/CONTRAST: CPT

## 2020-10-01 RX ORDER — IOPAMIDOL 755 MG/ML
100 INJECTION, SOLUTION INTRAVASCULAR ONCE
Status: COMPLETED | OUTPATIENT
Start: 2020-10-01 | End: 2020-10-01

## 2020-10-01 RX ADMIN — IOPAMIDOL 100 ML: 755 INJECTION, SOLUTION INTRAVENOUS at 08:29

## 2020-10-01 RX ADMIN — SODIUM CHLORIDE 66 ML: 9 INJECTION, SOLUTION INTRAVENOUS at 08:29

## 2020-10-01 NOTE — TELEPHONE ENCOUNTER
Routed to provider.    Pt responded in MyChart, YES, he would like referral to GI.    Thania Abbott RN

## 2020-10-01 NOTE — TELEPHONE ENCOUNTER
Referral to GI has been signed. Please give info on scheduling to patient - per referral order, he will be contacted by the schedulers. This could be in next few business days.

## 2020-11-02 ENCOUNTER — OFFICE VISIT (OUTPATIENT)
Dept: FAMILY MEDICINE | Facility: CLINIC | Age: 55
End: 2020-11-02
Payer: COMMERCIAL

## 2020-11-02 VITALS
BODY MASS INDEX: 29.2 KG/M2 | HEART RATE: 93 BPM | RESPIRATION RATE: 16 BRPM | TEMPERATURE: 98 F | OXYGEN SATURATION: 98 % | DIASTOLIC BLOOD PRESSURE: 80 MMHG | HEIGHT: 71 IN | SYSTOLIC BLOOD PRESSURE: 122 MMHG | WEIGHT: 208.6 LBS

## 2020-11-02 DIAGNOSIS — Z01.818 PREOP GENERAL PHYSICAL EXAM: Primary | ICD-10-CM

## 2020-11-02 PROCEDURE — 99214 OFFICE O/P EST MOD 30 MIN: CPT | Performed by: FAMILY MEDICINE

## 2020-11-02 ASSESSMENT — ANXIETY QUESTIONNAIRES
GAD7 TOTAL SCORE: 3
3. WORRYING TOO MUCH ABOUT DIFFERENT THINGS: NOT AT ALL
7. FEELING AFRAID AS IF SOMETHING AWFUL MIGHT HAPPEN: SEVERAL DAYS
2. NOT BEING ABLE TO STOP OR CONTROL WORRYING: NOT AT ALL
5. BEING SO RESTLESS THAT IT IS HARD TO SIT STILL: NOT AT ALL
IF YOU CHECKED OFF ANY PROBLEMS ON THIS QUESTIONNAIRE, HOW DIFFICULT HAVE THESE PROBLEMS MADE IT FOR YOU TO DO YOUR WORK, TAKE CARE OF THINGS AT HOME, OR GET ALONG WITH OTHER PEOPLE: SOMEWHAT DIFFICULT
1. FEELING NERVOUS, ANXIOUS, OR ON EDGE: NOT AT ALL
6. BECOMING EASILY ANNOYED OR IRRITABLE: SEVERAL DAYS

## 2020-11-02 ASSESSMENT — PATIENT HEALTH QUESTIONNAIRE - PHQ9
SUM OF ALL RESPONSES TO PHQ QUESTIONS 1-9: 12
5. POOR APPETITE OR OVEREATING: SEVERAL DAYS

## 2020-11-02 ASSESSMENT — MIFFLIN-ST. JEOR: SCORE: 1803.33

## 2020-11-02 NOTE — PATIENT INSTRUCTIONS
"  Preparing for Your Surgery  Getting started  A surgery nurse will call you to review your health history and instructions. They will give you an arrival time based on your scheduled surgery time.  Please be ready to share the following:    Your doctor's clinic name and phone number    Your medical, surgical and anesthesia history    A list of allergies and sensitivities    A list of medicines, including herbal treatments and over-the-counter drugs    Whether the patient has a legal guardian (ask how to send us the papers in advance)  If your child is having surgery, please ask for a copy of Preparing for Your Child's Surgery.    Preparing for surgery    Within 30 days of surgery: Have an exam at your family clinic (primary care clinic), or go to a pre-operative clinic. This exam is called a \"History and Physical,\" or H&P.    At your H&P exam, talk to your care team about all medicines you take. If you need to stop any medicines before surgery, ask when to start taking them again.  ? We do this for your safety. Many medicines can make you bleed too much during surgery. Some change how well surgery (anesthesia) drugs work.    Call your insurance company to see what it will and won't pay for. Ask if they need to pre-approve the surgery. (If no insurance, call [phone].)    Call your surgeon's clinic if there's any change in your health. This includes signs of a cold or flu (sore throat, runny nose, cough, rash, fever). It also includes a scrape or scratch near the surgery site.    If you have questions on the day of surgery, call your surgery center.  Eating and drinking guidelines  For your safety: Unless your surgeon tells you otherwise, follow the guidelines below.    Eat and drink as usual until 8 hours before surgery. After that, no food or milk.    Drink clear liquids until 2 hours before surgery. These are liquids you can see through, like water, Gatorade and Propel Water. You may also have black coffee " and tea (no cream or milk).    Nothing by mouth within 2 hours of surgery. This includes gum, candy and breath mints.    Stop alcohol the midnight before surgery.    If your family clinic tells you to take medicine on the morning of surgery, it's okay to take it with a sip of water.  Preventing infection    Shower or bathe the night before and morning of your surgery. Follow the instructions your clinic gave you. (If no instructions, use regular soap.)    Don't shave or clip hair near your surgery site. This can lead to skin infection.    Don't smoke the morning of surgery. Smoking increases the risk of infection. You may chew nicotine gum up to 2 hours before surgery. A nicotine patch is okay.  ? Note: Some surgeries require you to completely quit smoking and nicotine. Check with your surgeon.    Your care team will make every effort to keep you safe from infection. We will:  ? Clean our hands often with soap and water (or an alcohol-based hand rub).  ? Clean the skin at your surgery site with a special soap that kills germs. We'll also remove hair from the site as needed.  ? Wear special hair covers, masks, gowns and gloves during surgery.  ? Give antibiotic medicine, if prescribed. Not all surgeries need antibiotics.  What to bring on the day of surgery    Photo ID and insurance card    Copy of your health care directive, if you have one    Glasses and hearing aides (bring cases)  ? You can't wear contacts during surgery    Inhaler and eye drops, if you use them (tell us about these when you arrive)    CPAP machine or breathing device, if you use them    A few personal items, if spending the night    If you have . . .  ? A pacemaker or ICD (cardiac defibrillator): Bring the ID card.  ? An implanted stimulator: Bring the remote control.  ? A legal guardian: Bring a copy of the certified (court-stamped) guardianship papers.  Please remove any jewelry, including body piercings. Leave jewelry and other valuables at  home.  If you're going home the day of surgery  Important: If you don't follow the rules below, we must cancel your surgery.     Arrange for someone to drive you home after surgery. You may not drive, take a taxi or take public transportation by yourself (unless you'll have local anesthesia only).    Arrange for a responsible adult to stay with you overnight. If you don't, we may keep you in the hospital overnight, and you may need to pay the costs yourself.  Questions?   If you have any questions for your care team, list them here: _________________________________________________________________________________________________________________________________________________________________________________________________________________________________________________________________________________________________________________________  For informational purposes only. Not to replace the advice of your health care provider. Copyright   4319-6564 [employer]. All rights reserved. Clinically reviewed by Carmen Arzola MD. [employer] 235051 - REV 07/19.      Recent Labs   Lab Test 09/29/20  1016 10/29/19  1552   HGB 16.1 15.1    238    139   POTASSIUM 3.9 3.9   CR 1.10 0.85   A1C 6.2*  --         Diagnostics:  The CBC and creatinine were normal on 9-29-20.    No EKG required, no history of coronary heart disease, significant arrhythmia, peripheral arterial disease or other structural heart disease.    Revised Cardiac Risk Index (RCRI):  The patient has the following serious cardiovascular risks for perioperative complications:   - No serious cardiac risks = 0 points     RCRI Interpretation: 0 points: Class I (very low risk - 0.4% complication rate)    Assessment & Plan   The proposed surgical procedure is considered LOW risk.    Risks and Recommendations:  The patient has the following additional risks and recommendations for perioperative complications:   - No identified additional risk  factors other than previously addressed    Take the Zoloft on the day before surgery.   Take no aspirin or advil or aleve for one week before surgery.   Your stomach should be empty for 8 hours before surgery.     RECOMMENDATION:  APPROVAL GIVEN to proceed with proposed procedure, without further diagnostic evaluation.

## 2020-11-02 NOTE — PROGRESS NOTES
[facility]  [address] [address]  Phone: [phone]  Primary Provider: No Fierro  Pre-op Performing Provider: MERISSA DURAN    PREOPERATIVE EVALUATION:  Today's date: 11/2/2020    Daniel Schultz is a 55 year old male who presents for a preoperative evaluation.    Surgical Information:  Surgery/Procedure: Right total knee replacement, for primary osteoarthritis  Surgery Location: [facility]  Surgeon: Dr. Mensah  Surgery Date: 11/30/20  Time of Surgery: TBD  Where patient plans to recover: At home with family  Fax number for surgical facility: [phone]    Type of Anesthesia Anticipated: to be determined    Subjective     HPI related to upcoming procedure: see above.     Preop Questions 11/2/2020   1. Have you ever had a heart attack or stroke? No   2. Have you ever had surgery on your heart or blood vessels, such as a stent placement, a coronary artery bypass, or surgery on an artery in your head, neck, heart, or legs? No   3. Do you have chest pain with activity? No   4. Do you have a history of  heart failure? No   5. Do you currently have a cold, bronchitis or symptoms of other infection? No   6. Do you have a cough, shortness of breath, or wheezing? No   7. Do you or anyone in your family have previous history of blood clots? No   8. Do you or does anyone in your family have a serious bleeding problem such as prolonged bleeding following surgeries or cuts? No   9. Have you ever had problems with anemia or been told to take iron pills? No   10. Have you had any abnormal blood loss such as black, tarry or bloody stools? No   11. Have you ever had a blood transfusion? No   12. Are you willing to have a blood transfusion if it is medically needed before, during, or after your surgery? Yes   13. Have you or any of your relatives ever had problems with anesthesia? No   14. Do you have sleep apnea, excessive snoring  or daytime drowsiness? No   15. Do you have any artifical heart valves or other implanted medical devices like a pacemaker, defibrillator, or continuous glucose monitor? No   16. Do you have artificial joints? No   17. Are you allergic to latex? No       Health Care Directive:  Patient does not have a Health Care Directive or Living Will:     Preoperative Review of :    Review of Systems  CONSTITUTIONAL: NEGATIVE for fever, chills, change in weight  ENT/MOUTH: NEGATIVE for ear, mouth and throat problems  RESP: NEGATIVE for significant cough or SOB  CV: NEGATIVE for chest pain, palpitations or peripheral edema    Patient Active Problem List    Diagnosis Date Noted     JOSE ALFREDO (generalized anxiety disorder) 02/26/2019     Priority: Medium     Moderate single current episode of major depressive disorder (H) 01/08/2019     Priority: Medium     JAZMIN (obstructive sleep apnea) 08/18/2015     Priority: Medium     Severe JAZMIN   - HST on 8/17/2015 with weight 240 lbs, AHI 34, chris desat 86%, strong positional component.  Events labeled as central suspected to be post-arousal and feel it is most consistent with JAZMIN  He does not wear CPAP - 1/8/2019         Pre-diabetes 07/07/2015     Priority: Medium     Nonalcoholic steatohepatitis (WILKINSON) 05/26/2015     Priority: Medium     Fatty liver seen on US.  Had mild transaminitis       Meniscus tear 09/25/2013     Priority: Medium     Hyperlipidemia LDL goal <160 12/21/2010     Priority: Medium      History reviewed. No pertinent past medical history.  Past Surgical History:   Procedure Laterality Date     ARTHROSCOPY ANKLE, OPEN REPAIR LIGAMENT, COMBINED Right 1/5/2017    Procedure: COMBINED ARTHROSCOPY ANKLE, OPEN REPAIR LIGAMENT;  Surgeon: Avinash Dick DPM;  Location: [facility]     ARTHROSCOPY KNEE IRRIGATION AND DEBRIDEMENT  9/27/2013    Procedure: ARTHROSCOPY KNEE IRRIGATION AND DEBRIDEMENT;;  Surgeon: Ley, Jeffrey Duane, MD;  Location: [facility]     ARTHROSCOPY KNEE WITH MEDIAL  "MENISCECTOMY  9/27/2013    Procedure: ARTHROSCOPY KNEE WITH MEDIAL MENISCECTOMY;  Right Knee Arthroscopy Partial Menisectomy and Debridement,Left Knee Arthroscopy Debridement and Loose Body Removal--needs assist.;  Surgeon: Ley, Jeffrey Duane, MD;  Location: [facility]     COLONOSCOPY N/A 8/17/2016    Procedure: COLONOSCOPY;  Surgeon: Rudolph Nazario MD;  Location: [facility]     HEMORRHOIDECTOMY  2000    Internal and external hemorrhoidectomy     REPAIR TENDON ANKLE Right 1/25/2018    Procedure: REPAIR TENDON ANKLE;  Right Ankle Posterior Tibial Tendon Repair,Peroneus Brevis Repair, Medial Ligament Repair;  Surgeon: Avinash Dick DPM;  Location: [facility]     REPAIR TENDON FOOT Right 4/26/2018    Procedure: REPAIR TENDON FOOT;  Right insertional posterior tibial tendon repair and tendon transfer.;  Surgeon: Avinash Dick DPM;  Location: [facility]     SURGICAL HISTORY OF -   6/2001    Left Achilles tendon repair     Current Outpatient Medications   Medication Sig Dispense Refill     sertraline (ZOLOFT) 50 MG tablet Take 3 tablets (150 mg) by mouth daily 270 tablet 3       Allergies   Allergen Reactions     Nkda [No Known Drug Allergies]         Social History     Tobacco Use     Smoking status: Never Smoker     Smokeless tobacco: Never Used   Substance Use Topics     Alcohol use: Yes     Alcohol/week: 0.0 standard drinks     Comment: Occasional       History   Drug Use No         Objective     /80 (BP Location: Left arm, Patient Position: Chair, Cuff Size: Adult Large)   Pulse 93   Temp 98  F (36.7  C) (Tympanic)   Resp 16   Ht 1.803 m (5' 11\")   Wt 94.6 kg (208 lb 9.6 oz)   SpO2 98%   BMI 29.09 kg/m      Physical Exam  Exam:  GENERAL APPEARANCE: healthy, alert and no distress  EYES: EOMI,  PERRL  HENT: ear canals and TM's normal and nose and mouth without ulcers or lesions  NECK: no adenopathy, no asymmetry, masses, or scars and thyroid normal to palpation  RESP: lungs clear to auscultation - no " rales, rhonchi or wheezes  CV: regular rates and rhythm, normal S1 S2, no S3 or S4 and no murmur, click or rub -  ABDOMEN:  soft, nontender, no HSM or masses and bowel sounds normal  MS: arthritis of the right knee; the right ankle has well healed scars; the pulses and sensation in the feet are normal.   SKIN: no suspicious lesions or rashes  NEURO: Normal strength and tone, sensory exam grossly normal, mentation intact and speech normal  PSYCH: mentation appears normal and affect normal/bright      Recent Labs   Lab Test 09/29/20  1016 10/29/19  1552   HGB 16.1 15.1    238    139   POTASSIUM 3.9 3.9   CR 1.10 0.85   A1C 6.2*  --         Diagnostics:  The CBC and creatinine were normal on 9-29-20.    No EKG required, no history of coronary heart disease, significant arrhythmia, peripheral arterial disease or other structural heart disease.    Revised Cardiac Risk Index (RCRI):  The patient has the following serious cardiovascular risks for perioperative complications:   - No serious cardiac risks = 0 points     RCRI Interpretation: 0 points: Class I (very low risk - 0.4% complication rate)    Assessment & Plan   The proposed surgical procedure is considered LOW risk.    Risks and Recommendations:  The patient has the following additional risks and recommendations for perioperative complications:   - No identified additional risk factors other than previously addressed    Take the Zoloft on the day before surgery.   Take no aspirin or advil or aleve for one week before surgery.   Your stomach should be empty for 8 hours before surgery.     RECOMMENDATION:  APPROVAL GIVEN to proceed with proposed procedure, without further diagnostic evaluation.    Signed Electronically by: Reuben Rodriguez MD    Copy of this evaluation report is provided to requesting physician.    [facility] Guidelines    Revised Cardiac Risk Index

## 2020-11-03 ASSESSMENT — ANXIETY QUESTIONNAIRES: GAD7 TOTAL SCORE: 3

## 2020-11-16 ENCOUNTER — HEALTH MAINTENANCE LETTER (OUTPATIENT)
Age: 55
End: 2020-11-16

## 2020-12-14 ENCOUNTER — TRANSFERRED RECORDS (OUTPATIENT)
Dept: HEALTH INFORMATION MANAGEMENT | Facility: CLINIC | Age: 55
End: 2020-12-14

## 2021-01-11 ENCOUNTER — TRANSFERRED RECORDS (OUTPATIENT)
Dept: HEALTH INFORMATION MANAGEMENT | Facility: CLINIC | Age: 56
End: 2021-01-11

## 2021-03-23 ENCOUNTER — MYC MEDICAL ADVICE (OUTPATIENT)
Dept: FAMILY MEDICINE | Facility: CLINIC | Age: 56
End: 2021-03-23

## 2021-03-29 ENCOUNTER — OFFICE VISIT (OUTPATIENT)
Dept: FAMILY MEDICINE | Facility: CLINIC | Age: 56
End: 2021-03-29
Payer: COMMERCIAL

## 2021-03-29 VITALS
TEMPERATURE: 98 F | WEIGHT: 219.6 LBS | OXYGEN SATURATION: 98 % | SYSTOLIC BLOOD PRESSURE: 124 MMHG | HEIGHT: 71 IN | DIASTOLIC BLOOD PRESSURE: 86 MMHG | BODY MASS INDEX: 30.74 KG/M2 | HEART RATE: 95 BPM | RESPIRATION RATE: 14 BRPM

## 2021-03-29 DIAGNOSIS — M10.071 ACUTE IDIOPATHIC GOUT INVOLVING TOE OF RIGHT FOOT: Primary | ICD-10-CM

## 2021-03-29 LAB
ANION GAP SERPL CALCULATED.3IONS-SCNC: 3 MMOL/L (ref 3–14)
BUN SERPL-MCNC: 12 MG/DL (ref 7–30)
CALCIUM SERPL-MCNC: 8.8 MG/DL (ref 8.5–10.1)
CHLORIDE SERPL-SCNC: 105 MMOL/L (ref 94–109)
CO2 SERPL-SCNC: 29 MMOL/L (ref 20–32)
CREAT SERPL-MCNC: 1.04 MG/DL (ref 0.66–1.25)
GFR SERPL CREATININE-BSD FRML MDRD: 80 ML/MIN/{1.73_M2}
GLUCOSE SERPL-MCNC: 110 MG/DL (ref 70–99)
POTASSIUM SERPL-SCNC: 3.8 MMOL/L (ref 3.4–5.3)
SODIUM SERPL-SCNC: 137 MMOL/L (ref 133–144)
URATE SERPL-MCNC: 9.4 MG/DL (ref 3.5–7.2)

## 2021-03-29 PROCEDURE — 84550 ASSAY OF BLOOD/URIC ACID: CPT | Performed by: FAMILY MEDICINE

## 2021-03-29 PROCEDURE — 36415 COLL VENOUS BLD VENIPUNCTURE: CPT | Performed by: FAMILY MEDICINE

## 2021-03-29 PROCEDURE — 80048 BASIC METABOLIC PNL TOTAL CA: CPT | Performed by: FAMILY MEDICINE

## 2021-03-29 PROCEDURE — 99213 OFFICE O/P EST LOW 20 MIN: CPT | Performed by: FAMILY MEDICINE

## 2021-03-29 RX ORDER — COLCHICINE 0.6 MG/1
TABLET ORAL
Qty: 10 TABLET | Refills: 0 | Status: SHIPPED | OUTPATIENT
Start: 2021-03-29 | End: 2021-07-27

## 2021-03-29 ASSESSMENT — MIFFLIN-ST. JEOR: SCORE: 1848.23

## 2021-03-29 NOTE — PROGRESS NOTES
Assessment & Plan     Acute idiopathic gout involving toe of right foot  Patient had good response and relief from colchicine.  Advised possible triggers and how to prevent flares.  Push oral fluids.  Consider allopurinol if more frequent flares/.  - Uric acid  - Basic metabolic panel  - colchicine (COLCYRS) 0.6 MG tablet  Dispense: 10 tablet; Refill: 0    Patient Instructions   You will be contacted in 1-2 days for results of your lab tests.  Patient Education     Gout    Gout is an inflammation of a joint caused by an inflammatory response to gout crystals in the joint fluid. This occurs when there is excess uric acid. Uric acid is a normal waste product in the body. It builds up in the body when the kidneys can't filter enough of it from the blood. This may occur with age. It is also associated with kidney disease. Gout occurs more often in people with obesity, diabetes, high blood pressure, or high levels of fats in the blood. It may run in families. Gout tends to come and go. A flare up of gout is called an attack. Drinking alcohol or eating certain foods (such as shellfish or foods with additives such as high-fructose corn syrup) may increase uric acid levels in the blood and cause a gout attack.   During a gout attack, the affected joint may become hot, red, swollen, and painful. If you have had one attack of gout, you are likely to have another. An attack of gout can be treated with medicine. If these attacks become frequent, a daily medicine may be prescribed to help the kidneys remove uric acid from the body.   Home care  During a gout attack:    Contact your healthcare provider for advice and therapy options at the early stages of a gout flare. Treating the flare right away can prevent it from getting worse.    Rest painful joints. If gout affects the joints of your foot or leg, you may want to use crutches for the first few days to keep from bearing weight on the affected joint.    When sitting or  lying down, raise the painful joint to a level higher than your heart.    Apply an ice pack (ice cubes in a plastic bag wrapped in a thin towel) over the injured area for 20 minutes every 1 to 2 hours the first day for pain relief. Continue this 3 to 4 times a day for swelling and pain.    Avoid alcohol and foods listed below (see Preventing attacks) during a gout attack. Drink extra fluid to help flush the uric acid through your kidneys.    If you were prescribed a medicine to treat gout, take it as your healthcare provider has instructed. Don't skip doses.    Take anti-inflammatory medicine as directed by your healthcare provider.    If pain medicines have been prescribed, take them exactly as directed.    Preventing attacks    Limit or stop  alcohol use. Excess alcohol intake can cause a gout attack.    Limit these foods and beverages:  ? Organ meats, such as kidneys and liver  ? Certain seafoods (anchovies, sardines, shrimp, scallops, herring, mackerel)  ? Wild game, meat extracts and meat gravies  ? Foods and beverages sweetened with high-fructose corn syrup, such as sodas    Eat a healthy diet including low-fat and nonfat dairy, whole grains, and vegetables.    If you are overweight, talk to your healthcare provider about a weight reduction plan. Avoid fasting or extreme low calorie diets (less than 900 calories per day). This will increase uric acid levels in the body.    If you have diabetes or high blood pressure, work with your doctor to manage these conditions.    Protect the joint from injury. Wear good fitting socks and shoes. Injury can trigger a gout attack.    Follow-up care  Follow up with your healthcare provider, or as advised.   When to seek medical advice  Call your healthcare provider if you have any of the following:    Fever over 100.4 F (38. C) with worsening joint pain    Increasing redness around the joint    Pain developing in another joint    Repeated vomiting, abdominal pain, or blood  in the vomit or stool (black or red color)  [employer] last reviewed this educational content on 6/1/2019 2000-2020 [employer]. All rights reserved. This information is not intended as a substitute for professional medical care. Always follow your healthcare professional's instructions.           Patient Education     Eating to Prevent Gout  Gout is a painful form of arthritis caused by an excess of uric acid. This is a waste product made by the body. It builds up in the body and forms crystals that collect in the joints, causing a gout attack. Alcohol and certain foods can trigger a gout attack. Below are some guidelines for changing your diet to help you manage gout. Your healthcare provider can work with you to determine the best eating plan for you. You can learn which foods affect your gout more than others. Reactions to different foods can vary from person to person. Know that diet is only one part of managing gout. Take your medicines as prescribed and follow the other guidelines your healthcare provider has given you.   Foods to limit  Eating too many foods containing purines may increase the levels of uric acid in your body and increase your risk for a gout attack. It may be best to limit these high-purine foods:     Alcohol (beer, hard liquor and red wine). You may be told to give up alcohol completely.    Certain fish (anchovies, sardines, fish roes, herring, tuna, mussels, codfish, scallops, trout, and jacob)    Certain meats (red meat, processed meat, guerrero, turkey, wild game, and goose)    Sauces and gravies made with meat    Organ meats (such as liver, kidneys, sweetbreads, and tripe)    Legumes (such as dried beans and peas)    Mushrooms, spinach, asparagus, and cauliflower    Yeast and yeast extract supplements  Foods to try  Some foods may be helpful for people with gout. You may want to try adding some of the following foods to your diet:     Dark berries. These include  blueberries, blackberries, and cherries. These berries contain chemicals that may lower uric acid.    Tofu. Tofu, which is made from soy, is a good source of protein. Studies have shown that it may be a better choice than meat for people with gout.    Omega fatty acids. These acids are found in fatty fish (such as salmon), certain oils (such as flax, olive, or nut oils), or nuts. They may help prevent inflammation due to gout.  Gout guidelines  The following guidelines are recommended by the Academy of Nutrition and Dietetics for people with gout. Your diet should be:     High in fiber, whole grains, fruits, and vegetables    Low in protein. About 15% of calories should come from protein. Choose lean sources, such as soy, lean meats, and poultry without the skin.    Low in fat. No more than 30% of calories should come from fat, with only 10% coming from animal (saturated) fat.  [employer] last reviewed this educational content on 8/1/2020 2000-2020 [employer]. All rights reserved. This information is not intended as a substitute for professional medical care. Always follow your healthcare professional's instructions.               Return if symptoms worsen or fail to improve.    Fidel Hernandez MD  [facility]    Ezra Jaime is a 56 year old who presents for the following health issues:  Chief Complaint   Patient presents with     Arthritis     Pt here for a recent gout flare-up, needs medication refilled.       HPI     Concern - gout  Onset: 1 wk ago  Description: Pt had flare-up of gout in right big toe.  Patient states he gets less than a handful of flares yearly (averaging 1 per 2-4 months).  Intensity: moderate, severe  Progression of Symptoms:  Improving a little and constant  Accompanying Signs & Symptoms: redness, swelling, pain  Previous history of similar problem: has had gout in the past  Precipitating factors:        Worsened by: walking  Alleviating  factors:        Improved by: none  Therapies tried and outcome: ice, elevation has helped some; otc APAP and NSAID - partial relief    Was last given colchicine for his flares. Been out for a while    Patient cannot identify specific triggers that started this flare, though he said he ate small amt of pistachios and has had a few beers several days before start of gout attack.    Patient Active Problem List   Diagnosis     Hyperlipidemia LDL goal <160     Meniscus tear     Nonalcoholic steatohepatitis (WILKINSON)     Pre-diabetes     JAZMIN (obstructive sleep apnea)     Moderate single current episode of major depressive disorder (H)     JOSE ALFREDO (generalized anxiety disorder)     Past Surgical History:   Procedure Laterality Date     ARTHROSCOPY ANKLE, OPEN REPAIR LIGAMENT, COMBINED Right 1/5/2017    Procedure: COMBINED ARTHROSCOPY ANKLE, OPEN REPAIR LIGAMENT;  Surgeon: Avinash Dick DPM;  Location: [facility]     ARTHROSCOPY KNEE IRRIGATION AND DEBRIDEMENT  9/27/2013    Procedure: ARTHROSCOPY KNEE IRRIGATION AND DEBRIDEMENT;;  Surgeon: Ley, Jeffrey Duane, MD;  Location: [facility]     ARTHROSCOPY KNEE WITH MEDIAL MENISCECTOMY  9/27/2013    Procedure: ARTHROSCOPY KNEE WITH MEDIAL MENISCECTOMY;  Right Knee Arthroscopy Partial Menisectomy and Debridement,Left Knee Arthroscopy Debridement and Loose Body Removal--needs assist.;  Surgeon: Ley, Jeffrey Duane, MD;  Location: [facility]     COLONOSCOPY N/A 8/17/2016    Procedure: COLONOSCOPY;  Surgeon: Rudolph Nazario MD;  Location: [facility]     HEMORRHOIDECTOMY  2000    Internal and external hemorrhoidectomy     REPAIR TENDON ANKLE Right 1/25/2018    Procedure: REPAIR TENDON ANKLE;  Right Ankle Posterior Tibial Tendon Repair,Peroneus Brevis Repair, Medial Ligament Repair;  Surgeon: Avinash Dick DPM;  Location: [facility]     REPAIR TENDON FOOT Right 4/26/2018    Procedure: REPAIR TENDON FOOT;  Right insertional posterior tibial tendon repair and tendon transfer.;  Surgeon: Mayelin  "Avinash Null DPM;  Location: [facility]     SURGICAL HISTORY OF -   6/2001    Left Achilles tendon repair       Social History     Tobacco Use     Smoking status: Never Smoker     Smokeless tobacco: Never Used   Substance Use Topics     Alcohol use: Yes     Alcohol/week: 0.0 standard drinks     Comment: Occasional     Family History   Problem Relation Age of Onset     Aneurysm Mother      Depression Father          Current Outpatient Medications   Medication Sig Dispense Refill     colchicine (COLCYRS) 0.6 MG tablet Take two tablets at onset of gout, then one tablet few hours later. 10 tablet 0     sertraline (ZOLOFT) 50 MG tablet Take 3 tablets (150 mg) by mouth daily 270 tablet 3     Allergies   Allergen Reactions     Nkda [No Known Drug Allergies]      Review of Systems   C: NEGATIVE for fever, chills, change in weight  I: NEGATIVE for worrisome rashes, moles or lesions  MUSCULOSKELETAL:see above  N: NEGATIVE for weakness, dizziness or paresthesias  H: NEGATIVE for bleeding problems      Objective    /86   Pulse 95   Temp 98  F (36.7  C) (Tympanic)   Resp 14   Ht 1.803 m (5' 11\")   Wt 99.6 kg (219 lb 9.6 oz)   SpO2 98%   BMI 30.63 kg/m    Body mass index is 30.63 kg/m .  Physical Exam   GENERAL:  alert and no distress  MS: mild swelling and mild TTP of right first MTP with no nodules or joint mass; range of motion of right first MTP limited due to pain/swelling  SKIN: see above    Results for orders placed or performed in visit on 03/29/21   Basic metabolic panel     Status: None (In process)   Result Value Ref Range    Sodium 137 133 - 144 mmol/L    Potassium 3.8 3.4 - 5.3 mmol/L    Chloride 105 94 - 109 mmol/L    Carbon Dioxide PENDING 20 - 32 mmol/L    Anion Gap PENDING 3 - 14 mmol/L    Glucose PENDING 70 - 99 mg/dL    Urea Nitrogen PENDING 7 - 30 mg/dL    Creatinine PENDING 0.66 - 1.25 mg/dL    GFR Estimate PENDING >60 mL/min/[1.73_m2]    GFR Estimate If Black PENDING >60 mL/min/[1.73_m2]    Calcium " PENDING 8.5 - 10.1 mg/dL

## 2021-03-29 NOTE — PATIENT INSTRUCTIONS
You will be contacted in 1-2 days for results of your lab tests.  Patient Education     Gout    Gout is an inflammation of a joint caused by an inflammatory response to gout crystals in the joint fluid. This occurs when there is excess uric acid. Uric acid is a normal waste product in the body. It builds up in the body when the kidneys can't filter enough of it from the blood. This may occur with age. It is also associated with kidney disease. Gout occurs more often in people with obesity, diabetes, high blood pressure, or high levels of fats in the blood. It may run in families. Gout tends to come and go. A flare up of gout is called an attack. Drinking alcohol or eating certain foods (such as shellfish or foods with additives such as high-fructose corn syrup) may increase uric acid levels in the blood and cause a gout attack.   During a gout attack, the affected joint may become hot, red, swollen, and painful. If you have had one attack of gout, you are likely to have another. An attack of gout can be treated with medicine. If these attacks become frequent, a daily medicine may be prescribed to help the kidneys remove uric acid from the body.   Home care  During a gout attack:    Contact your healthcare provider for advice and therapy options at the early stages of a gout flare. Treating the flare right away can prevent it from getting worse.    Rest painful joints. If gout affects the joints of your foot or leg, you may want to use crutches for the first few days to keep from bearing weight on the affected joint.    When sitting or lying down, raise the painful joint to a level higher than your heart.    Apply an ice pack (ice cubes in a plastic bag wrapped in a thin towel) over the injured area for 20 minutes every 1 to 2 hours the first day for pain relief. Continue this 3 to 4 times a day for swelling and pain.    Avoid alcohol and foods listed below (see Preventing attacks) during a gout attack. Drink extra  fluid to help flush the uric acid through your kidneys.    If you were prescribed a medicine to treat gout, take it as your healthcare provider has instructed. Don't skip doses.    Take anti-inflammatory medicine as directed by your healthcare provider.    If pain medicines have been prescribed, take them exactly as directed.    Preventing attacks    Limit or stop  alcohol use. Excess alcohol intake can cause a gout attack.    Limit these foods and beverages:  ? Organ meats, such as kidneys and liver  ? Certain seafoods (anchovies, sardines, shrimp, scallops, herring, mackerel)  ? Wild game, meat extracts and meat gravies  ? Foods and beverages sweetened with high-fructose corn syrup, such as sodas    Eat a healthy diet including low-fat and nonfat dairy, whole grains, and vegetables.    If you are overweight, talk to your healthcare provider about a weight reduction plan. Avoid fasting or extreme low calorie diets (less than 900 calories per day). This will increase uric acid levels in the body.    If you have diabetes or high blood pressure, work with your doctor to manage these conditions.    Protect the joint from injury. Wear good fitting socks and shoes. Injury can trigger a gout attack.    Follow-up care  Follow up with your healthcare provider, or as advised.   When to seek medical advice  Call your healthcare provider if you have any of the following:    Fever over 100.4 F (38. C) with worsening joint pain    Increasing redness around the joint    Pain developing in another joint    Repeated vomiting, abdominal pain, or blood in the vomit or stool (black or red color)  [employer] last reviewed this educational content on 6/1/2019 2000-2020 [employer]. All rights reserved. This information is not intended as a substitute for professional medical care. Always follow your healthcare professional's instructions.           Patient Education     Eating to Prevent Gout  Gout is a painful form of  arthritis caused by an excess of uric acid. This is a waste product made by the body. It builds up in the body and forms crystals that collect in the joints, causing a gout attack. Alcohol and certain foods can trigger a gout attack. Below are some guidelines for changing your diet to help you manage gout. Your healthcare provider can work with you to determine the best eating plan for you. You can learn which foods affect your gout more than others. Reactions to different foods can vary from person to person. Know that diet is only one part of managing gout. Take your medicines as prescribed and follow the other guidelines your healthcare provider has given you.   Foods to limit  Eating too many foods containing purines may increase the levels of uric acid in your body and increase your risk for a gout attack. It may be best to limit these high-purine foods:     Alcohol (beer, hard liquor and red wine). You may be told to give up alcohol completely.    Certain fish (anchovies, sardines, fish roes, herring, tuna, mussels, codfish, scallops, trout, and jacob)    Certain meats (red meat, processed meat, guerrero, turkey, wild game, and goose)    Sauces and gravies made with meat    Organ meats (such as liver, kidneys, sweetbreads, and tripe)    Legumes (such as dried beans and peas)    Mushrooms, spinach, asparagus, and cauliflower    Yeast and yeast extract supplements  Foods to try  Some foods may be helpful for people with gout. You may want to try adding some of the following foods to your diet:     Dark berries. These include blueberries, blackberries, and cherries. These berries contain chemicals that may lower uric acid.    Tofu. Tofu, which is made from soy, is a good source of protein. Studies have shown that it may be a better choice than meat for people with gout.    Omega fatty acids. These acids are found in fatty fish (such as salmon), certain oils (such as flax, olive, or nut oils), or nuts. They may  help prevent inflammation due to gout.  Gout guidelines  The following guidelines are recommended by the Academy of Nutrition and Dietetics for people with gout. Your diet should be:     High in fiber, whole grains, fruits, and vegetables    Low in protein. About 15% of calories should come from protein. Choose lean sources, such as soy, lean meats, and poultry without the skin.    Low in fat. No more than 30% of calories should come from fat, with only 10% coming from animal (saturated) fat.  [employer] last reviewed this educational content on 8/1/2020 2000-2020 [employer]. All rights reserved. This information is not intended as a substitute for professional medical care. Always follow your healthcare professional's instructions.

## 2021-03-31 ENCOUNTER — MYC MEDICAL ADVICE (OUTPATIENT)
Dept: FAMILY MEDICINE | Facility: CLINIC | Age: 56
End: 2021-03-31

## 2021-03-31 DIAGNOSIS — M10.079 IDIOPATHIC GOUT OF FOOT, UNSPECIFIED CHRONICITY, UNSPECIFIED LATERALITY: Primary | ICD-10-CM

## 2021-03-31 RX ORDER — ALLOPURINOL 100 MG/1
100 TABLET ORAL DAILY
Qty: 90 TABLET | Refills: 0 | Status: SHIPPED | OUTPATIENT
Start: 2021-03-31 | End: 2021-07-27

## 2021-03-31 NOTE — TELEPHONE ENCOUNTER
Dr. Hernandez,      Patient sends my chart message with wanting to start medication to reduce the uric acid. Zoey GREENE RN

## 2021-04-13 ENCOUNTER — IMMUNIZATION (OUTPATIENT)
Dept: FAMILY MEDICINE | Facility: CLINIC | Age: 56
End: 2021-04-13
Payer: COMMERCIAL

## 2021-04-13 PROCEDURE — 0011A PR COVID VAC MODERNA 100 MCG/0.5 ML IM: CPT

## 2021-04-13 PROCEDURE — 91301 PR COVID VAC MODERNA 100 MCG/0.5 ML IM: CPT

## 2021-05-11 ENCOUNTER — IMMUNIZATION (OUTPATIENT)
Dept: FAMILY MEDICINE | Facility: CLINIC | Age: 56
End: 2021-05-11
Attending: FAMILY MEDICINE
Payer: COMMERCIAL

## 2021-05-11 PROCEDURE — 91301 PR COVID VAC MODERNA 100 MCG/0.5 ML IM: CPT

## 2021-05-11 PROCEDURE — 0012A PR COVID VAC MODERNA 100 MCG/0.5 ML IM: CPT

## 2021-06-21 NOTE — PROGRESS NOTES
PHYSICAL THERAPY DISCHARGE NOTE  04/11/18 0900   Signing Clinician's Name / Credentials   Signing clinician's name / credentials Brit Pérez, PT, DPT, OCS   Session Number   Session Number 7 HP   Progress Note/Recertification   Progress Note Due Date 04/19/18   Adult Goals   PT Ortho Eval Goals 1;2;3;4   Ortho Goal 1   Goal Identifier 1   Goal Description Patient will be able to walk without a limp.    Target Date 04/19/18   Date Met (limp first few steps after sitting)   Ortho Goal 2   Goal Identifier 2   Goal Description Patient will be able to ascend/descend stairs without a rail demonstrating normal gait mechanics and minimal pain or difficulty.   Target Date 05/03/18   Date Met 04/11/18   Ortho Goal 3   Goal Identifier 3   Goal Description Patient will be able to walk 1 mile with minimal pain or difficulty.   Target Date 05/31/18   Ortho Goal 4   Goal Identifier 4   Goal Description Patient will be able to return to golf with minimal pain or difficulty.   Target Date 05/31/18   Subjective Report   Subjective Report Pain 3-4/10. Limp with first few steps after getting up.    Objective Measures   Objective Measures Objective Measure 1;Objective Measure 2;Objective Measure 3   Objective Measure 1   Objective Measure R Ankle AROM   Details DF 10*, PF 45, Inversion 45, Eversion 15   Objective Measure 2   Objective Measure Single Leg Stance   Details R 7 seconds   Objective Measure 3   Objective Measure R Ankle Strength   Details PF/INV: 4/5, PF/EV 4+/5   Therapeutic Procedure/exercise   Minutes 15   Skilled Intervention ROM, flexibility, strength, HEP instruction to decrease pain and improve function   Patient Response band ex difficult   Treatment Detail slant board gastroc and soleus stretching, modified resisted ankle inversion and eversion with RTB to more of a plantarflexed position to inc post tib and peroneal tendon effort x 20 B, plantarflexion stretching 30' x 4    Neuromuscular Re-education   Minutes  15   Skilled Intervention Balance and proprioception training   Patient Response sore but no sharp pain, tolerated well   Treatment Detail SLS on firm, BAPS board CW and CCW with cues to keep big toe on the board, squas on back of BOSU ball x 20   Education   Learner Patient   Readiness Eager   Method Booklet/handout;Explanation;Demonstration   Response Verbalizes Understanding;Demonstrates Understanding   Plan   Home program above exercises    Updates to plan of care f/u in 2 weeks   Plan for next session progress balance, closed chain ex   Total Session Time   Timed Code Treatment Minutes 30   Total Treatment Time (sum of timed and untimed services) 30, nmr te     UPDATE: Patient had another tendon rupture, will be having surgery again. Will discharge for this episode of care.        Please contact me with any questions or concerns.  Thank you for your referral.    Brit Pérez, PT, DPT, OCS  Physical Therapist, Orthopedic Certified Specialist  [facility] - [facility]  [phone]     Initial (On Arrival)

## 2021-07-21 ENCOUNTER — APPOINTMENT (OUTPATIENT)
Dept: CT IMAGING | Facility: CLINIC | Age: 56
End: 2021-07-21
Attending: EMERGENCY MEDICINE
Payer: COMMERCIAL

## 2021-07-21 ENCOUNTER — HOSPITAL ENCOUNTER (EMERGENCY)
Facility: CLINIC | Age: 56
Discharge: HOME | End: 2021-07-21
Attending: EMERGENCY MEDICINE | Admitting: EMERGENCY MEDICINE
Payer: COMMERCIAL

## 2021-07-21 VITALS
BODY MASS INDEX: 30.06 KG/M2 | SYSTOLIC BLOOD PRESSURE: 97 MMHG | RESPIRATION RATE: 19 BRPM | DIASTOLIC BLOOD PRESSURE: 59 MMHG | WEIGHT: 210 LBS | TEMPERATURE: 96.6 F | HEART RATE: 87 BPM | OXYGEN SATURATION: 96 % | HEIGHT: 70 IN

## 2021-07-21 DIAGNOSIS — J06.9 UPPER RESPIRATORY TRACT INFECTION, UNSPECIFIED TYPE: ICD-10-CM

## 2021-07-21 DIAGNOSIS — R07.9 ACUTE CHEST PAIN: ICD-10-CM

## 2021-07-21 LAB
ANION GAP SERPL CALCULATED.3IONS-SCNC: 5 MMOL/L (ref 3–14)
BASOPHILS # BLD AUTO: 0 10E3/UL (ref 0–0.2)
BASOPHILS NFR BLD AUTO: 1 %
BUN SERPL-MCNC: 11 MG/DL (ref 7–30)
CALCIUM SERPL-MCNC: 8.5 MG/DL (ref 8.5–10.1)
CHLORIDE BLD-SCNC: 106 MMOL/L (ref 94–109)
CO2 SERPL-SCNC: 27 MMOL/L (ref 20–32)
CREAT SERPL-MCNC: 0.98 MG/DL (ref 0.66–1.25)
EOSINOPHIL # BLD AUTO: 0.3 10E3/UL (ref 0–0.7)
EOSINOPHIL NFR BLD AUTO: 4 %
ERYTHROCYTE [DISTWIDTH] IN BLOOD BY AUTOMATED COUNT: 12.1 % (ref 10–15)
GFR SERPL CREATININE-BSD FRML MDRD: 86 ML/MIN/1.73M2
GLUCOSE BLD-MCNC: 151 MG/DL (ref 70–99)
HCT VFR BLD AUTO: 46.4 % (ref 40–53)
HGB BLD-MCNC: 16.1 G/DL (ref 13.3–17.7)
HOLD SPECIMEN: NORMAL
IMM GRANULOCYTES # BLD: 0 10E3/UL
IMM GRANULOCYTES NFR BLD: 0 %
LIPASE SERPL-CCNC: 98 U/L (ref 73–393)
LYMPHOCYTES # BLD AUTO: 2.3 10E3/UL (ref 0.8–5.3)
LYMPHOCYTES NFR BLD AUTO: 27 %
MCH RBC QN AUTO: 31.1 PG (ref 26.5–33)
MCHC RBC AUTO-ENTMCNC: 34.7 G/DL (ref 31.5–36.5)
MCV RBC AUTO: 90 FL (ref 78–100)
MONOCYTES # BLD AUTO: 0.8 10E3/UL (ref 0–1.3)
MONOCYTES NFR BLD AUTO: 9 %
NEUTROPHILS # BLD AUTO: 5.1 10E3/UL (ref 1.6–8.3)
NEUTROPHILS NFR BLD AUTO: 59 %
NRBC # BLD AUTO: 0 10E3/UL
NRBC BLD AUTO-RTO: 0 /100
PLATELET # BLD AUTO: 221 10E3/UL (ref 150–450)
POTASSIUM BLD-SCNC: 3.8 MMOL/L (ref 3.4–5.3)
RBC # BLD AUTO: 5.18 10E6/UL (ref 4.4–5.9)
SARS-COV-2 RNA RESP QL NAA+PROBE: NEGATIVE
SODIUM SERPL-SCNC: 138 MMOL/L (ref 133–144)
TROPONIN I SERPL-MCNC: <0.015 UG/L (ref 0–0.04)
WBC # BLD AUTO: 8.5 10E3/UL (ref 4–11)

## 2021-07-21 PROCEDURE — 80048 BASIC METABOLIC PNL TOTAL CA: CPT | Performed by: EMERGENCY MEDICINE

## 2021-07-21 PROCEDURE — C9803 HOPD COVID-19 SPEC COLLECT: HCPCS | Performed by: EMERGENCY MEDICINE

## 2021-07-21 PROCEDURE — 87635 SARS-COV-2 COVID-19 AMP PRB: CPT | Performed by: EMERGENCY MEDICINE

## 2021-07-21 PROCEDURE — 99285 EMERGENCY DEPT VISIT HI MDM: CPT | Mod: 25 | Performed by: EMERGENCY MEDICINE

## 2021-07-21 PROCEDURE — 250N000011 HC RX IP 250 OP 636: Performed by: EMERGENCY MEDICINE

## 2021-07-21 PROCEDURE — 83690 ASSAY OF LIPASE: CPT | Performed by: EMERGENCY MEDICINE

## 2021-07-21 PROCEDURE — 93005 ELECTROCARDIOGRAM TRACING: CPT | Performed by: EMERGENCY MEDICINE

## 2021-07-21 PROCEDURE — 250N000009 HC RX 250: Performed by: EMERGENCY MEDICINE

## 2021-07-21 PROCEDURE — 71275 CT ANGIOGRAPHY CHEST: CPT

## 2021-07-21 PROCEDURE — 93010 ELECTROCARDIOGRAM REPORT: CPT | Performed by: EMERGENCY MEDICINE

## 2021-07-21 PROCEDURE — 36415 COLL VENOUS BLD VENIPUNCTURE: CPT | Performed by: EMERGENCY MEDICINE

## 2021-07-21 PROCEDURE — 84484 ASSAY OF TROPONIN QUANT: CPT | Performed by: EMERGENCY MEDICINE

## 2021-07-21 PROCEDURE — 250N000013 HC RX MED GY IP 250 OP 250 PS 637: Performed by: EMERGENCY MEDICINE

## 2021-07-21 PROCEDURE — 85041 AUTOMATED RBC COUNT: CPT | Performed by: EMERGENCY MEDICINE

## 2021-07-21 RX ORDER — DOXYCYCLINE 100 MG/1
100 CAPSULE ORAL ONCE
Status: COMPLETED | OUTPATIENT
Start: 2021-07-21 | End: 2021-07-21

## 2021-07-21 RX ORDER — IOPAMIDOL 755 MG/ML
86 INJECTION, SOLUTION INTRAVASCULAR ONCE
Status: COMPLETED | OUTPATIENT
Start: 2021-07-21 | End: 2021-07-21

## 2021-07-21 RX ORDER — DOXYCYCLINE 100 MG/1
100 CAPSULE ORAL 2 TIMES DAILY
Qty: 14 CAPSULE | Refills: 0 | Status: SHIPPED | OUTPATIENT
Start: 2021-07-21 | End: 2021-07-27

## 2021-07-21 RX ADMIN — IOPAMIDOL 86 ML: 755 INJECTION, SOLUTION INTRAVENOUS at 19:57

## 2021-07-21 RX ADMIN — DOXYCYCLINE HYCLATE 100 MG: 100 CAPSULE ORAL at 23:51

## 2021-07-21 RX ADMIN — SODIUM CHLORIDE 100 ML: 9 INJECTION, SOLUTION INTRAVENOUS at 19:57

## 2021-07-21 ASSESSMENT — MIFFLIN-ST. JEOR: SCORE: 1788.8

## 2021-07-21 NOTE — ED PROVIDER NOTES
History     Chief Complaint   Patient presents with     Chest Pain     CP starting at 9am     HPI  Daniel Schultz is a 56 year old male who presents for evaluation of anterior central chest tightness and dull discomfort associated with left posterior shoulder pain which began 9 AM this morning, constant and earlier more severe but now spontaneously improved.  He feels mildly short of breath but without nausea or diaphoresis/sweating.  Pain is not exacerbated by deep inspiration, but shoulder pain is mildly exacerbated by change in position and movement.  He has had 4 days of mild URI symptoms with nonproductive cough, rhinorrhea and frontal headache.  2 days ago he had a negative COVID-19 saliva test.  No recent travel or known infectious exposures.  He has no hemoptysis or leg pain or leg swelling.  No history of DVT.    Previous Records Reviewed:  5/29/15 Stress Echo  Interpretation Summary  The patient exercised 10 minutes.  Normal resting wall motion and no stress-induced wall motion abnormality.  No evidence of inducible ischemia on stress test.  Limited views suggest mild right ventricular dilatation with normal RV  systolic function. RVSP cannot be estimated.  Stress  The patient exercised 10 minutes.  Normal blood pressure response to exercise.  The patient exhibited no chest pain during exercise.  Exercise was stopped due to fatigue.  The EKG portion of this stress test was negative for inducible ischemia (see  echo results below).  The visual ejection fraction is estimated at >70%.  Left ventricular cavity size decreases with exercise.  Global LV systolic function augments with exercise.  Normal resting wall motion and no stress-induced wall motion abnormality.  This was a normal stress echocardiogram.    Allergies:  Allergies   Allergen Reactions     Nkda [No Known Drug Allergies]        Problem List:    Patient Active Problem List    Diagnosis Date Noted     JOSE ALFREDO (generalized anxiety disorder)  02/26/2019     Priority: Medium     Moderate single current episode of major depressive disorder (H) 01/08/2019     Priority: Medium     JAZMIN (obstructive sleep apnea) 08/18/2015     Priority: Medium     Severe JAZMIN   - HST on 8/17/2015 with weight 240 lbs, AHI 34, chris desat 86%, strong positional component.  Events labeled as central suspected to be post-arousal and feel it is most consistent with JAZMIN  He does not wear CPAP - 1/8/2019         Pre-diabetes 07/07/2015     Priority: Medium     Nonalcoholic steatohepatitis (WILKINSON) 05/26/2015     Priority: Medium     Fatty liver seen on US.  Had mild transaminitis       Meniscus tear 09/25/2013     Priority: Medium     Hyperlipidemia LDL goal <160 12/21/2010     Priority: Medium        Past Medical History:    Past Medical History:   Diagnosis Date     Moderate single current episode of major depressive disorder (H)        Past Surgical History:    Past Surgical History:   Procedure Laterality Date     ARTHROSCOPY ANKLE, OPEN REPAIR LIGAMENT, COMBINED Right 1/5/2017    Procedure: COMBINED ARTHROSCOPY ANKLE, OPEN REPAIR LIGAMENT;  Surgeon: Avinash Dick DPM;  Location: [facility]     ARTHROSCOPY KNEE IRRIGATION AND DEBRIDEMENT  9/27/2013    Procedure: ARTHROSCOPY KNEE IRRIGATION AND DEBRIDEMENT;;  Surgeon: Ley, Jeffrey Duane, MD;  Location: [facility]     ARTHROSCOPY KNEE WITH MEDIAL MENISCECTOMY  9/27/2013    Procedure: ARTHROSCOPY KNEE WITH MEDIAL MENISCECTOMY;  Right Knee Arthroscopy Partial Menisectomy and Debridement,Left Knee Arthroscopy Debridement and Loose Body Removal--needs assist.;  Surgeon: Ley, Jeffrey Duane, MD;  Location: [facility]     COLONOSCOPY N/A 8/17/2016    Procedure: COLONOSCOPY;  Surgeon: Rudolph Nazario MD;  Location: [facility]     HEMORRHOIDECTOMY  2000    Internal and external hemorrhoidectomy     REPAIR TENDON ANKLE Right 1/25/2018    Procedure: REPAIR TENDON ANKLE;  Right Ankle Posterior Tibial Tendon Repair,Peroneus Brevis Repair, Medial  "Ligament Repair;  Surgeon: Avinash Dick DPM;  Location: [facility]     REPAIR TENDON FOOT Right 4/26/2018    Procedure: REPAIR TENDON FOOT;  Right insertional posterior tibial tendon repair and tendon transfer.;  Surgeon: Avinash Dick DPM;  Location: [facility]     SURGICAL HISTORY OF -   6/2001    Left Achilles tendon repair       Family History:    Family History   Problem Relation Age of Onset     Aneurysm Mother      Depression Father        Social History:  Marital Status:   [2]  Social History     Tobacco Use     Smoking status: Never Smoker     Smokeless tobacco: Never Used   Substance Use Topics     Alcohol use: Yes     Alcohol/week: 0.0 standard drinks     Comment: Occasional     Drug use: No        Medications:    doxycycline hyclate (VIBRAMYCIN) 100 MG capsule  allopurinol (ZYLOPRIM) 100 MG tablet  colchicine (COLCYRS) 0.6 MG tablet  sertraline (ZOLOFT) 50 MG tablet        Review of Systems  As mentioned above in the history present illness.  All other systems were reviewed and are negative.    Physical Exam   BP: (!) 135/94  Pulse: 98  Temp: (!) 96.6  F (35.9  C)  Resp: 18  Height: 177.8 cm (5' 10\")  Weight: 95.3 kg (210 lb)  SpO2: 97 %      Physical Exam  Vitals and nursing note reviewed.   Constitutional:       General: He is not in acute distress.     Appearance: Normal appearance. He is well-developed. He is not ill-appearing or diaphoretic.   HENT:      Head: Normocephalic and atraumatic.      Right Ear: External ear normal.      Left Ear: External ear normal.      Nose: Nose normal.   Eyes:      General: No scleral icterus.     Extraocular Movements: Extraocular movements intact.      Conjunctiva/sclera: Conjunctivae normal.   Neck:      Trachea: No tracheal deviation.   Cardiovascular:      Rate and Rhythm: Normal rate and regular rhythm.      Pulses: Normal pulses.      Heart sounds: Normal heart sounds. No murmur heard.   No friction rub. No gallop.    Pulmonary:      Effort: " Pulmonary effort is normal. No respiratory distress.      Breath sounds: Normal breath sounds. No stridor. No decreased breath sounds, wheezing, rhonchi or rales.   Chest:      Chest wall: No tenderness.   Abdominal:      General: Bowel sounds are normal. There is no distension.      Palpations: Abdomen is soft.      Tenderness: There is no abdominal tenderness.   Musculoskeletal:         General: No swelling or tenderness. Normal range of motion.      Cervical back: Normal range of motion and neck supple.      Right lower leg: No tenderness. No edema.      Left lower leg: No tenderness. No edema.   Skin:     General: Skin is warm and dry.      Coloration: Skin is not pale.      Findings: No erythema or rash.   Neurological:      General: No focal deficit present.      Mental Status: He is alert and oriented to person, place, and time.   Psychiatric:         Mood and Affect: Mood normal.         Behavior: Behavior normal.         ED Course        Procedures              EKG Interpretation:      Interpreted by Joey Robbins MD  Time reviewed: 6:55 PM  Symptoms at time of EKG: Chest pain  Rhythm: normal sinus   Rate: normal  Axis: normal  Ectopy: none  Conduction: Poor R wave progression, late transition, possible V2-V3 lead reversal, otherwise normal  ST Segments/ T Waves: No ST-T wave changes  Q Waves: none  Comparison to prior: as above otherwise unchanged from 10/9/2019  Clinical Impression: normal EKG           Results for orders placed or performed during the hospital encounter of 07/21/21 (from the past 24 hour(s))   CBC with Platelets & Differential    Narrative    The following orders were created for panel order CBC with Platelets & Differential.  Procedure                               Abnormality         Status                     ---------                               -----------         ------                     CBC with platelets and d...[[id number]]                      Final result                  Please view results for these tests on the individual orders.   Basic metabolic panel   Result Value Ref Range    Sodium 138 133 - 144 mmol/L    Potassium 3.8 3.4 - 5.3 mmol/L    Chloride 106 94 - 109 mmol/L    Carbon Dioxide (CO2) 27 20 - 32 mmol/L    Anion Gap 5 3 - 14 mmol/L    Urea Nitrogen 11 7 - 30 mg/dL    Creatinine 0.98 0.66 - 1.25 mg/dL    Calcium 8.5 8.5 - 10.1 mg/dL    Glucose 151 (H) 70 - 99 mg/dL    GFR Estimate 86 >60 mL/min/1.73m2   Troponin I   Result Value Ref Range    Troponin I <0.015 0.000 - 0.045 ug/L   CBC with platelets and differential   Result Value Ref Range    WBC Count 8.5 4.0 - 11.0 10e3/uL    RBC Count 5.18 4.40 - 5.90 10e6/uL    Hemoglobin 16.1 13.3 - 17.7 g/dL    Hematocrit 46.4 40.0 - 53.0 %    MCV 90 78 - 100 fL    MCH 31.1 26.5 - 33.0 pg    MCHC 34.7 31.5 - 36.5 g/dL    RDW 12.1 10.0 - 15.0 %    Platelet Count 221 150 - 450 10e3/uL    % Neutrophils 59 %    % Lymphocytes 27 %    % Monocytes 9 %    % Eosinophils 4 %    % Basophils 1 %    % Immature Granulocytes 0 %    NRBCs per 100 WBC 0 <1 /100    Absolute Neutrophils 5.1 1.6 - 8.3 10e3/uL    Absolute Lymphocytes 2.3 0.8 - 5.3 10e3/uL    Absolute Monocytes 0.8 0.0 - 1.3 10e3/uL    Absolute Eosinophils 0.3 0.0 - 0.7 10e3/uL    Absolute Basophils 0.0 0.0 - 0.2 10e3/uL    Absolute Immature Granulocytes 0.0 <=0.0 10e3/uL    Absolute NRBCs 0.0 10e3/uL   [place] Draw    Narrative    The following orders were created for panel order [place] Draw.  Procedure                               Abnormality         Status                     ---------                               -----------         ------                     Extra Blue Top Tube[[id number]]                              Final result               Extra Red Top Tube[[id number]]                               Final result               Extra Green Top (Lithium...[[id number]]                      Final result                 Please view results for these tests on the individual  orders.   Extra Blue Top Tube   Result Value Ref Range    Hold Specimen JIC    Extra Red Top Tube   Result Value Ref Range    Hold Specimen JIC    Extra Green Top (Lithium Heparin) Tube   Result Value Ref Range    Hold Specimen JIC    Lipase   Result Value Ref Range    Lipase 98 73 - 393 U/L   CT Chest Pulmonary Embolism w Contrast    Narrative    EXAM: CT CHEST PULMONARY EMBOLISM W CONTRAST  LOCATION: [facility]   DATE/TIME: 7/21/2021 7:56 PM    INDICATION: chest pain radiating to left upper back, cough  COMPARISON: 5/16/2015  TECHNIQUE: CT chest pulmonary angiogram during arterial phase injection of IV contrast. Multiplanar reformats and MIP reconstructions were performed. Dose reduction techniques were used.   CONTRAST: 86 mL Isovue 370    FINDINGS:  ANGIOGRAM CHEST: Pulmonary arteries are normal caliber and negative for pulmonary emboli. Thoracic aorta is negative for dissection. No CT evidence of right heart strain.    LUNGS AND PLEURA: Mild groundglass opacities are seen bilaterally and symmetrically and dependently most suggestive of positional/dependent atelectasis. Lung volumes are low. Atypical pneumonia can't be excluded. There is no focal consolidation or   pleural effusion. Benign calcified granuloma left lower lobe.    MEDIASTINUM/AXILLAE: Normal.    CORONARY ARTERY CALCIFICATION: None.    UPPER ABDOMEN: 2 small right midpole stones present, largest measures 1.3 cm and is unchanged. There is an adjacent 2 mm right mid pole stone. No hydronephrosis.    MUSCULOSKELETAL: Degenerative changes. No suspicious bony lesion.      Impression    IMPRESSION:  1.  No pulmonary emboli identified.  2.  No etiology for symptoms. Low lung volumes with modest presumed dependent atelectasis.   Asymptomatic COVID-19 Virus (Coronavirus) by PCR Nasopharyngeal    Specimen: Nasopharyngeal; Swab    Narrative    The following orders were created for panel order Asymptomatic COVID-19 Virus (Coronavirus) by  PCR Nasopharyngeal.  Procedure                               Abnormality         Status                     ---------                               -----------         ------                     SARS-COV2 (COVID-19) Vir...[[id number]]  Normal              Final result                 Please view results for these tests on the individual orders.   SARS-COV2 (COVID-19) Virus RT-PCR    Specimen: Nasopharyngeal; Swab   Result Value Ref Range    SARS CoV2 PCR Negative Negative    Narrative    Testing was performed using the awilda  SARS-CoV-2 & Influenza A/B Assay on the awilda  Jadyn  System.  This test should be ordered for the detection of SARS-COV-2 in individuals who meet SARS-CoV-2 clinical and/or epidemiological criteria. Test performance is unknown in asymptomatic patients.  This test is for in vitro diagnostic use under the FDA EUA for laboratories certified under CLIA to perform moderate and/or high complexity testing. This test has not been FDA cleared or approved.  A negative test does not rule out the presence of PCR inhibitors in the specimen or target RNA in concentration below the limit of detection for the assay. The possibility of a false negative should be considered if the patient's recent exposure or clinical presentation suggests COVID-19.  [facility] are certified under the Clinical Laboratory Improvement Amendments of 1988 (CLIA-88) as qualified to perform moderate and/or high complexity laboratory testing.       Medications   0.9% sodium chloride BOLUS (has no administration in time range)   doxycycline hyclate (VIBRAMYCIN) capsule 100 mg (has no administration in time range)   iopamidol (ISOVUE-370) solution 86 mL (86 mLs Intravenous Given 7/21/21 1957)   sodium chloride 0.9 % bag 500mL for CT scan flush use (100 mLs As instructed Given 7/21/21 1957)     7:45 PM - Evaluation thus far, including negative troponin.  After discussion of the differential diagnosis we will proceed  with CT chest evaluation.    At time of discharge he reports pain has resolved.    Assessments & Plan (with Medical Decision Making)   56 year old male who presents for evaluation of anterior central chest tightness and dull discomfort associated with left posterior shoulder pain which began 9 AM this morning, constant and earlier more severe but now spontaneously improved.  He feels mildly short of breath but without nausea or diaphoresis/sweating.  Pain is not exacerbated by deep inspiration, but shoulder pain is mildly exacerbated by change in position and movement.  He has had 4 days of mild URI symptoms with nonproductive cough, rhinorrhea. 2 days ago he had a negative COVID-19 saliva test.  Today's COVID-19 study is negative.  A CT of the chest PE protocol only showed mild groundglass opacities which were felt to be probable dependent atelectasis, but no focal infiltrate or other significant abnormality.  EKG and troponin were normal and I doubt atypical ACS.  Doubt PE/DVT, aneurysm/dissection or other emergent disease process. Will treat with Doxycycline prophylactically for possible CAP with the CT findings in the setting of mild URI symptoms which are more likely due to a benign viral illness.  I recommended he follow-up with his primary care provider in the next week for reevaluation, and that he return for recurrent or worsening symptoms, or new problems or concerns.    I have reviewed the nursing notes.    I have reviewed the findings, diagnosis, plan and need for follow up with the patient.    New Prescriptions    DOXYCYCLINE HYCLATE (VIBRAMYCIN) 100 MG CAPSULE    Take 1 capsule (100 mg) by mouth 2 times daily for 7 days       Final diagnoses:   Acute chest pain   Upper respiratory tract infection, unspecified type       7/21/2021   [facility] EMERGENCY DEPT     Joey Robbins MD  07/24/21 [id number]

## 2021-07-24 ENCOUNTER — HOSPITAL ENCOUNTER (EMERGENCY)
Facility: CLINIC | Age: 56
Discharge: HOME | End: 2021-07-24
Attending: STUDENT IN AN ORGANIZED HEALTH CARE EDUCATION/TRAINING PROGRAM | Admitting: STUDENT IN AN ORGANIZED HEALTH CARE EDUCATION/TRAINING PROGRAM
Payer: COMMERCIAL

## 2021-07-24 ENCOUNTER — APPOINTMENT (OUTPATIENT)
Dept: CT IMAGING | Facility: CLINIC | Age: 56
End: 2021-07-24
Attending: STUDENT IN AN ORGANIZED HEALTH CARE EDUCATION/TRAINING PROGRAM
Payer: COMMERCIAL

## 2021-07-24 VITALS
HEART RATE: 68 BPM | RESPIRATION RATE: 19 BRPM | DIASTOLIC BLOOD PRESSURE: 84 MMHG | TEMPERATURE: 98 F | BODY MASS INDEX: 32.93 KG/M2 | HEIGHT: 70 IN | WEIGHT: 230 LBS | SYSTOLIC BLOOD PRESSURE: 130 MMHG | OXYGEN SATURATION: 97 %

## 2021-07-24 DIAGNOSIS — R07.89 CHEST DISCOMFORT: ICD-10-CM

## 2021-07-24 DIAGNOSIS — M54.9 DISCOMFORT OF BACK: ICD-10-CM

## 2021-07-24 LAB
ALBUMIN SERPL-MCNC: 3.6 G/DL (ref 3.4–5)
ALP SERPL-CCNC: 70 U/L (ref 40–150)
ALT SERPL W P-5'-P-CCNC: 60 U/L (ref 0–70)
ANION GAP SERPL CALCULATED.3IONS-SCNC: 2 MMOL/L (ref 3–14)
AST SERPL W P-5'-P-CCNC: 21 U/L (ref 0–45)
BASOPHILS # BLD AUTO: 0.1 10E3/UL (ref 0–0.2)
BASOPHILS NFR BLD AUTO: 1 %
BILIRUB SERPL-MCNC: 0.4 MG/DL (ref 0.2–1.3)
BUN SERPL-MCNC: 10 MG/DL (ref 7–30)
CALCIUM SERPL-MCNC: 8.1 MG/DL (ref 8.5–10.1)
CHLORIDE BLD-SCNC: 107 MMOL/L (ref 94–109)
CO2 SERPL-SCNC: 28 MMOL/L (ref 20–32)
CREAT SERPL-MCNC: 1.08 MG/DL (ref 0.66–1.25)
CRP SERPL-MCNC: <2.9 MG/L (ref 0–8)
EOSINOPHIL # BLD AUTO: 0.3 10E3/UL (ref 0–0.7)
EOSINOPHIL NFR BLD AUTO: 4 %
ERYTHROCYTE [DISTWIDTH] IN BLOOD BY AUTOMATED COUNT: 12 % (ref 10–15)
GFR SERPL CREATININE-BSD FRML MDRD: 76 ML/MIN/1.73M2
GLUCOSE BLD-MCNC: 150 MG/DL (ref 70–99)
HCT VFR BLD AUTO: 45.5 % (ref 40–53)
HGB BLD-MCNC: 15.5 G/DL (ref 13.3–17.7)
IMM GRANULOCYTES # BLD: 0 10E3/UL
IMM GRANULOCYTES NFR BLD: 1 %
LYMPHOCYTES # BLD AUTO: 1.6 10E3/UL (ref 0.8–5.3)
LYMPHOCYTES NFR BLD AUTO: 23 %
MCH RBC QN AUTO: 30.8 PG (ref 26.5–33)
MCHC RBC AUTO-ENTMCNC: 34.1 G/DL (ref 31.5–36.5)
MCV RBC AUTO: 91 FL (ref 78–100)
MONOCYTES # BLD AUTO: 0.7 10E3/UL (ref 0–1.3)
MONOCYTES NFR BLD AUTO: 9 %
NEUTROPHILS # BLD AUTO: 4.3 10E3/UL (ref 1.6–8.3)
NEUTROPHILS NFR BLD AUTO: 62 %
NRBC # BLD AUTO: 0 10E3/UL
NRBC BLD AUTO-RTO: 0 /100
PLATELET # BLD AUTO: 207 10E3/UL (ref 150–450)
POTASSIUM BLD-SCNC: 4.4 MMOL/L (ref 3.4–5.3)
PROT SERPL-MCNC: 6.9 G/DL (ref 6.8–8.8)
RBC # BLD AUTO: 5.03 10E6/UL (ref 4.4–5.9)
SARS-COV-2 RNA RESP QL NAA+PROBE: NEGATIVE
SODIUM SERPL-SCNC: 137 MMOL/L (ref 133–144)
TROPONIN I SERPL-MCNC: <0.015 UG/L (ref 0–0.04)
WBC # BLD AUTO: 6.9 10E3/UL (ref 4–11)

## 2021-07-24 PROCEDURE — 93010 ELECTROCARDIOGRAM REPORT: CPT | Performed by: STUDENT IN AN ORGANIZED HEALTH CARE EDUCATION/TRAINING PROGRAM

## 2021-07-24 PROCEDURE — 96360 HYDRATION IV INFUSION INIT: CPT | Mod: 59 | Performed by: STUDENT IN AN ORGANIZED HEALTH CARE EDUCATION/TRAINING PROGRAM

## 2021-07-24 PROCEDURE — 250N000011 HC RX IP 250 OP 636: Performed by: STUDENT IN AN ORGANIZED HEALTH CARE EDUCATION/TRAINING PROGRAM

## 2021-07-24 PROCEDURE — 86140 C-REACTIVE PROTEIN: CPT | Performed by: STUDENT IN AN ORGANIZED HEALTH CARE EDUCATION/TRAINING PROGRAM

## 2021-07-24 PROCEDURE — 85025 COMPLETE CBC W/AUTO DIFF WBC: CPT | Performed by: STUDENT IN AN ORGANIZED HEALTH CARE EDUCATION/TRAINING PROGRAM

## 2021-07-24 PROCEDURE — 99285 EMERGENCY DEPT VISIT HI MDM: CPT | Mod: 25 | Performed by: STUDENT IN AN ORGANIZED HEALTH CARE EDUCATION/TRAINING PROGRAM

## 2021-07-24 PROCEDURE — C9803 HOPD COVID-19 SPEC COLLECT: HCPCS | Performed by: STUDENT IN AN ORGANIZED HEALTH CARE EDUCATION/TRAINING PROGRAM

## 2021-07-24 PROCEDURE — 80053 COMPREHEN METABOLIC PANEL: CPT | Performed by: STUDENT IN AN ORGANIZED HEALTH CARE EDUCATION/TRAINING PROGRAM

## 2021-07-24 PROCEDURE — 87635 SARS-COV-2 COVID-19 AMP PRB: CPT | Performed by: STUDENT IN AN ORGANIZED HEALTH CARE EDUCATION/TRAINING PROGRAM

## 2021-07-24 PROCEDURE — 36415 COLL VENOUS BLD VENIPUNCTURE: CPT | Performed by: STUDENT IN AN ORGANIZED HEALTH CARE EDUCATION/TRAINING PROGRAM

## 2021-07-24 PROCEDURE — 71275 CT ANGIOGRAPHY CHEST: CPT

## 2021-07-24 PROCEDURE — 258N000003 HC RX IP 258 OP 636: Performed by: STUDENT IN AN ORGANIZED HEALTH CARE EDUCATION/TRAINING PROGRAM

## 2021-07-24 PROCEDURE — 250N000009 HC RX 250: Performed by: STUDENT IN AN ORGANIZED HEALTH CARE EDUCATION/TRAINING PROGRAM

## 2021-07-24 PROCEDURE — 96361 HYDRATE IV INFUSION ADD-ON: CPT | Performed by: STUDENT IN AN ORGANIZED HEALTH CARE EDUCATION/TRAINING PROGRAM

## 2021-07-24 PROCEDURE — 93005 ELECTROCARDIOGRAM TRACING: CPT | Performed by: STUDENT IN AN ORGANIZED HEALTH CARE EDUCATION/TRAINING PROGRAM

## 2021-07-24 PROCEDURE — 84484 ASSAY OF TROPONIN QUANT: CPT | Performed by: STUDENT IN AN ORGANIZED HEALTH CARE EDUCATION/TRAINING PROGRAM

## 2021-07-24 RX ORDER — IOPAMIDOL 755 MG/ML
80 INJECTION, SOLUTION INTRAVASCULAR ONCE
Status: COMPLETED | OUTPATIENT
Start: 2021-07-24 | End: 2021-07-24

## 2021-07-24 RX ADMIN — SODIUM CHLORIDE 1000 ML: 9 INJECTION, SOLUTION INTRAVENOUS at 09:32

## 2021-07-24 RX ADMIN — IOPAMIDOL 80 ML: 755 INJECTION, SOLUTION INTRAVENOUS at 10:17

## 2021-07-24 RX ADMIN — SODIUM CHLORIDE 100 ML: 9 INJECTION, SOLUTION INTRAVENOUS at 10:17

## 2021-07-24 ASSESSMENT — MIFFLIN-ST. JEOR: SCORE: 1879.52

## 2021-07-27 ENCOUNTER — OFFICE VISIT (OUTPATIENT)
Dept: FAMILY MEDICINE | Facility: CLINIC | Age: 56
End: 2021-07-27
Payer: COMMERCIAL

## 2021-07-27 VITALS
SYSTOLIC BLOOD PRESSURE: 124 MMHG | WEIGHT: 230 LBS | HEART RATE: 85 BPM | OXYGEN SATURATION: 98 % | HEIGHT: 70 IN | BODY MASS INDEX: 32.93 KG/M2 | RESPIRATION RATE: 12 BRPM | DIASTOLIC BLOOD PRESSURE: 92 MMHG | TEMPERATURE: 98 F

## 2021-07-27 DIAGNOSIS — F32.1 MODERATE SINGLE CURRENT EPISODE OF MAJOR DEPRESSIVE DISORDER (H): ICD-10-CM

## 2021-07-27 DIAGNOSIS — F41.1 GAD (GENERALIZED ANXIETY DISORDER): ICD-10-CM

## 2021-07-27 DIAGNOSIS — R07.9 CHEST PAIN, UNSPECIFIED TYPE: Primary | ICD-10-CM

## 2021-07-27 PROCEDURE — 99214 OFFICE O/P EST MOD 30 MIN: CPT | Performed by: INTERNAL MEDICINE

## 2021-07-27 RX ORDER — ESCITALOPRAM OXALATE 10 MG/1
TABLET ORAL
Qty: 90 TABLET | Refills: 3 | Status: SHIPPED | OUTPATIENT
Start: 2021-07-27 | End: 2022-04-13 | Stop reason: ALTCHOICE

## 2021-07-27 ASSESSMENT — ANXIETY QUESTIONNAIRES
1. FEELING NERVOUS, ANXIOUS, OR ON EDGE: MORE THAN HALF THE DAYS
5. BEING SO RESTLESS THAT IT IS HARD TO SIT STILL: MORE THAN HALF THE DAYS
GAD7 TOTAL SCORE: 14
7. FEELING AFRAID AS IF SOMETHING AWFUL MIGHT HAPPEN: MORE THAN HALF THE DAYS
2. NOT BEING ABLE TO STOP OR CONTROL WORRYING: MORE THAN HALF THE DAYS
3. WORRYING TOO MUCH ABOUT DIFFERENT THINGS: MORE THAN HALF THE DAYS
6. BECOMING EASILY ANNOYED OR IRRITABLE: MORE THAN HALF THE DAYS

## 2021-07-27 ASSESSMENT — PATIENT HEALTH QUESTIONNAIRE - PHQ9
SUM OF ALL RESPONSES TO PHQ QUESTIONS 1-9: 13
5. POOR APPETITE OR OVEREATING: MORE THAN HALF THE DAYS

## 2021-07-27 ASSESSMENT — PAIN SCALES - GENERAL: PAINLEVEL: NO PAIN (0)

## 2021-07-27 ASSESSMENT — MIFFLIN-ST. JEOR: SCORE: 1879.52

## 2021-07-27 NOTE — PROGRESS NOTES
"    Assessment & Plan   Problem List Items Addressed This Visit     JOSE ALFREDO (generalized anxiety disorder)    Relevant Medications    escitalopram (LEXAPRO) 10 MG tablet    Moderate single current episode of major depressive disorder (H)    Relevant Medications    escitalopram (LEXAPRO) 10 MG tablet      Other Visit Diagnoses     Chest pain, unspecified type    -  Primary    Relevant Orders    Echocardiogram Exercise Stress                    BMI:   Estimated body mass index is 33 kg/m  as calculated from the following:    Height as of this encounter: 1.778 m (5' 10\").    Weight as of this encounter: 104.3 kg (230 lb).       Patient Instructions   1. Get stress test.  You need to have a cardiology test done.  Please call [phone] to schedule.   2. Possible causes of chest pain - anxiety, reflux, cardiac  3. Start Lexapro (escitalopram) 5 mg daily x 1 week then 10 mg daily  4. Follow-up if anxiety continues to be uncontrolled        No follow-ups on file.    No Fierro,   [facility] CARLOS Jaime is a 56 year old who presents for the following health issues     HPI     ED/UC Followup:    Facility:  [facility]  Date of visit: 7/21/21/ and 07/24/21  Reason for visit: Chest discomfort  Current Status: still has chest tightness - no pain      \"Assessments & Plan (with Medical Decision Making)   Daniel Schultz is a 56 year old male who presents to the department for evaluation of sharp left-sided chest pain radiating to left scapular region, however not reproducible with range of motion or tender to palpation.  Patient relays symptoms of mild dry cough without productivity, hemoptysis, shortness of breath or pleuritic symptoms.  There is no exertional component to his symptoms, EKG morphology similar to previous on file, troponin within reference range, low suspicion for ACS.  Previous CTPA study essentially ruled out large pulmonary embolism, pneumothorax, " "hydrothorax.  CT aortogram today does not identify aortic abnormality.  He has no EKG or clinical exam findings typical of pericarditis but remains on the differential.  Given the atypical nature of his symptoms and low risk HEART score, recommend outpatient follow-up for which he has been scheduled an appointment on Tuesday.  Outpatient exercise stress test cardiogram has also been ordered.  Patient is in agreement discharge plan including return instructions for evolving or changing symptoms.\"       Chest Pain  Onset/Duration:  1 1/2 weeks ago   Description:   Location: left side  Character: was severe 7/21, dull ache 7/24, today is a tightness   Radiation: from left side over shoulder  Duration: constant dull ache   Intensity: mild  Progression of Symptoms: improving  Accompanying Signs & Symptoms:  Shortness of breath: YES  Sweating: no  Nausea/vomiting: no  Lightheadedness: YES  Palpitations: no  Fever/Chills: no  Cough: YES-  Dry            Heartburn: no  History:   Family history of heart disease: no  Tobacco use: no  Previous similar symptoms: no   Precipitating factors:   Worse with exertion: no  Worse with deep breaths: no           Related to eating: no           Better with burping: no  Alleviating factors: none   Therapies tried and outcome: none   --the chest pain started while at work (works at [employer] as )  --1st CT on 7/21 showed GGO - pneumonia vs atelectasis, given antibiotic   --antibiotic did not help cough or other symptoms   --has been fully COVID vaccinated and multiple negative tests.  --has GERD symptoms about once/week, when consumes too much soda  --has palpitations due to anxiety; also feeling chest tightness at end of the day when trying to rest.  --stopped zoloft 1+ month ago because it made him feel funny; still seeing counselor 1 x month        Review of Systems   Constitutional, HEENT, cardiovascular, pulmonary, gi and gu systems are negative, except as otherwise noted.    " "  Objective    BP (!) 124/92 (BP Location: Right arm, Patient Position: Chair, Cuff Size: Adult Regular)   Pulse 85   Temp 98  F (36.7  C) (Tympanic)   Resp 12   Ht 1.778 m (5' 10\")   Wt 104.3 kg (230 lb)   SpO2 98%   BMI 33.00 kg/m    Body mass index is 33 kg/m .  Physical Exam   GENERAL APPEARANCE: healthy, alert and no distress  RESP: lungs clear to auscultation - no rales, rhonchi or wheezes  CV: regular rates and rhythm, normal S1 S2, no S3 or S4 and no murmur, click or rub  ABDOMEN: soft, nontender, without hepatosplenomegaly or masses and bowel sounds normal  PSYCH: mentation appears normal, affect normal/bright and worried                "

## 2021-07-27 NOTE — PATIENT INSTRUCTIONS
1. Get stress test.  You need to have a cardiology test done.  Please call [phone] to schedule.   2. Possible causes of chest pain - anxiety, reflux, cardiac  3. Start Lexapro (escitalopram) 5 mg daily x 1 week then 10 mg daily  4. Follow-up if anxiety continues to be uncontrolled

## 2021-07-28 ASSESSMENT — ANXIETY QUESTIONNAIRES: GAD7 TOTAL SCORE: 14

## 2021-09-12 ENCOUNTER — HEALTH MAINTENANCE LETTER (OUTPATIENT)
Age: 56
End: 2021-09-12

## 2021-10-11 ENCOUNTER — TRANSFERRED RECORDS (OUTPATIENT)
Dept: HEALTH INFORMATION MANAGEMENT | Facility: CLINIC | Age: 56
End: 2021-10-11

## 2021-11-07 ENCOUNTER — HEALTH MAINTENANCE LETTER (OUTPATIENT)
Age: 56
End: 2021-11-07

## 2021-12-13 ENCOUNTER — LAB REQUISITION (OUTPATIENT)
Dept: LAB | Facility: CLINIC | Age: 56
End: 2021-12-13

## 2021-12-13 LAB — HBV SURFACE AB SERPL IA-ACNC: 1.06 M[IU]/ML

## 2021-12-13 PROCEDURE — 86735 MUMPS ANTIBODY: CPT | Performed by: INTERNAL MEDICINE

## 2021-12-13 PROCEDURE — 86706 HEP B SURFACE ANTIBODY: CPT | Performed by: INTERNAL MEDICINE

## 2021-12-13 PROCEDURE — 86762 RUBELLA ANTIBODY: CPT | Performed by: INTERNAL MEDICINE

## 2021-12-13 PROCEDURE — 86481 TB AG RESPONSE T-CELL SUSP: CPT | Performed by: INTERNAL MEDICINE

## 2021-12-13 PROCEDURE — 86765 RUBEOLA ANTIBODY: CPT | Performed by: INTERNAL MEDICINE

## 2021-12-13 PROCEDURE — 86787 VARICELLA-ZOSTER ANTIBODY: CPT | Performed by: INTERNAL MEDICINE

## 2021-12-14 LAB
GAMMA INTERFERON BACKGROUND BLD IA-ACNC: 0.06 IU/ML
M TB IFN-G BLD-IMP: NEGATIVE
M TB IFN-G CD4+ BCKGRND COR BLD-ACNC: 9.94 IU/ML
MEV IGG SER IA-ACNC: >300 AU/ML
MEV IGG SER IA-ACNC: POSITIVE
MITOGEN IGNF BCKGRD COR BLD-ACNC: 0.01 IU/ML
MITOGEN IGNF BCKGRD COR BLD-ACNC: 0.02 IU/ML
MUMPS ANTIBODY IGG INSTRUMENT VALUE: 43.3 AU/ML
MUV IGG SER QL IA: POSITIVE
QUANTIFERON MITOGEN: 10 IU/ML
QUANTIFERON NIL TUBE: 0.06 IU/ML
QUANTIFERON TB1 TUBE: 0.07 IU/ML
QUANTIFERON TB2 TUBE: 0.08
RUBV IGG SERPL QL IA: 12.1 INDEX
RUBV IGG SERPL QL IA: POSITIVE
VZV IGG SER QL IA: 861.1 INDEX
VZV IGG SER QL IA: POSITIVE

## 2021-12-16 ENCOUNTER — APPOINTMENT (OUTPATIENT)
Dept: FAMILY MEDICINE | Facility: CLINIC | Age: 56
End: 2021-12-16
Payer: COMMERCIAL

## 2021-12-16 ENCOUNTER — LAB REQUISITION (OUTPATIENT)
Dept: LAB | Facility: CLINIC | Age: 56
End: 2021-12-16

## 2021-12-16 LAB — SARS-COV-2 RNA RESP QL NAA+PROBE: POSITIVE

## 2021-12-16 PROCEDURE — U0003 INFECTIOUS AGENT DETECTION BY NUCLEIC ACID (DNA OR RNA); SEVERE ACUTE RESPIRATORY SYNDROME CORONAVIRUS 2 (SARS-COV-2) (CORONAVIRUS DISEASE [COVID-19]), AMPLIFIED PROBE TECHNIQUE, MAKING USE OF HIGH THROUGHPUT TECHNOLOGIES AS DESCRIBED BY CMS-2020-01-R: HCPCS | Performed by: INTERNAL MEDICINE

## 2022-01-02 ENCOUNTER — HEALTH MAINTENANCE LETTER (OUTPATIENT)
Age: 57
End: 2022-01-02

## 2022-01-17 ENCOUNTER — TRANSFERRED RECORDS (OUTPATIENT)
Dept: HEALTH INFORMATION MANAGEMENT | Facility: CLINIC | Age: 57
End: 2022-01-17
Payer: COMMERCIAL

## 2022-03-15 ENCOUNTER — TRANSCRIBE ORDERS (OUTPATIENT)
Dept: OTHER | Age: 57
End: 2022-03-15
Payer: COMMERCIAL

## 2022-04-04 ENCOUNTER — HOSPITAL ENCOUNTER (OUTPATIENT)
Dept: PHYSICAL THERAPY | Facility: CLINIC | Age: 57
Setting detail: THERAPIES SERIES
Discharge: HOME | End: 2022-04-04
Attending: ORTHOPAEDIC SURGERY
Payer: COMMERCIAL

## 2022-04-04 PROCEDURE — 97116 GAIT TRAINING THERAPY: CPT | Mod: GP | Performed by: PHYSICAL THERAPIST

## 2022-04-04 PROCEDURE — 97161 PT EVAL LOW COMPLEX 20 MIN: CPT | Mod: GP | Performed by: PHYSICAL THERAPIST

## 2022-04-04 PROCEDURE — 97110 THERAPEUTIC EXERCISES: CPT | Mod: GP | Performed by: PHYSICAL THERAPIST

## 2022-04-04 NOTE — PROGRESS NOTES
PHYSICAL THERAPY INITIAL EVALUATION  04/04/22 1400   General Information   Type of Visit Initial OP Ortho PT Evaluation   Start of Care Date 04/04/22   Referring Physician Dr. Mensah    Patient/Family Goals Statement get ready for surgery   Orders Evaluate and Treat   Orders Comment 1 visit pre op   Date of Order 03/14/22   Certification Required? No   Medical Diagnosis right hip arthritis   Surgical/Medical history reviewed Yes   Precautions/Limitations no known precautions/limitations   Body Part(s)   Body Part(s) Hip   Presentation and Etiology   Pertinent history of current problem (include personal factors and/or comorbidities that impact the POC) Years of hip pain, Worsened a lot his past year, has no cartilage left. Has hip surgery scheduled for April 25th for a hip replacement.   Impairments A. Pain;B. Decreased WB tolerance;D. Decreased ROM;E. Decreased flexibility;F. Decreased strength and endurance;M. Locking or catching   Functional Limitations perform activities of daily living;perform desired leisure / sports activities;perform required work activities   Symptom Location R hip   How/Where did it occur From Degenerative Joint Disease   Chronicity Chronic   Pain rating (0-10 point scale) Best (/10);Worst (/10)   Best (/10) 2   Worst (/10) 9   Pain quality A. Sharp;B. Dull;E. Shooting;G. Cramping   Frequency of pain/symptoms A. Constant   Pain/symptoms exacerbated by A. Sitting;B. Walking;G. Certain positions   Pain/symptoms eased by E. Changing positions;H. Cold;I. OTC medication(s)   Progression of symptoms since onset: Worsened   Prior Level of Function   Prior Level of Function-Mobility independent   Prior Level of Function-ADLs independent    Current Level of Function   Patient role/employment history A. Employed   Fall Risk Screen   Fall screen completed by PT   Have you fallen 2 or more times in the past year? No   Have you fallen and had an injury in the past year? No   Is patient a fall  risk? No   Abuse Screen (yes response referral indicated)   Feels Unsafe at Home or Work/School no   Feels Threatened by Someone no   Does Anyone Try to Keep You From Having Contact with Others or Doing Things Outside Your Home? no   Patient needs abuse support services and resources No   Functional Scales   Functional Scales Other   Other Scales  LEFS: 48/80   Hip Objective Findings   Side (if bilateral, select both right and left) Right;Left   Gait/Locomotion antalgic   Right Hip Flexion PROM 90, pain   Right Hip ER PROM 10, pain   Right Hip IR PROM 0, pain    Right Hip Flexion Strength 3+/5, pain   Right Hip Abduction Strength 3+/5, pain   Left Hip Flexion PROM WNL   Left Hip ER PROM 30   Left Hip IR PROM 30   Left Hip Flexion Strength 5   Left Hip Abduction Strength 5   Planned Therapy Interventions   Planned Therapy Interventions ROM;strengthening;stretching;gait training   Clinical Impression   Criteria for Skilled Therapeutic Interventions Met yes, treatment indicated   PT Diagnosis right hip arthritis   Influenced by the following impairments pain, decrerased ROM, decreased flexibility, decreaseds strength   Functional limitations due to impairments walking, squautting, getting in and out of truck, stairs, sitting, standing   Clinical Presentation Evolving/Changing   Clinical Presentation Rationale worsening sx, severe OA   Clinical Decision Making (Complexity) Moderate complexity   Therapy Frequency 1 time/week   Predicted Duration of Therapy Intervention (days/wks) 1 visist   Risk & Benefits of therapy have been explained Yes   Patient, Family & other staff in agreement with plan of care Yes   Education Assessment   Preferred Learning Style Listening;Demonstration;Pictures/video   Barriers to Learning No barriers   ORTHO GOALS   PT Ortho Eval Goals 1   Ortho Goal 1   Goal Identifier 1   Goal Description Patient will successfully demonstrate pre and post surgery exercises and gait training with AD.     Target Date 04/04/22   Date Met 04/04/22   Total Evaluation Time   PT Bjorn, Low Complexity Minutes (60494) 20       Please contact me with any questions or concerns.  Thank you for your referral.    Brit Pérez, PT, DPT, OCS  Physical Therapist, Orthopedic Certified Specialist    [facility]  [address] [place] [id number]  [email or web address]   Office: [phone]   Employed by [employer]

## 2022-04-13 ENCOUNTER — OFFICE VISIT (OUTPATIENT)
Dept: FAMILY MEDICINE | Facility: CLINIC | Age: 57
End: 2022-04-13
Payer: COMMERCIAL

## 2022-04-13 ENCOUNTER — TELEPHONE (OUTPATIENT)
Dept: CARDIOLOGY | Facility: CLINIC | Age: 57
End: 2022-04-13

## 2022-04-13 VITALS
RESPIRATION RATE: 12 BRPM | TEMPERATURE: 97.3 F | SYSTOLIC BLOOD PRESSURE: 120 MMHG | HEIGHT: 70 IN | OXYGEN SATURATION: 98 % | BODY MASS INDEX: 30.67 KG/M2 | WEIGHT: 214.2 LBS | HEART RATE: 79 BPM | DIASTOLIC BLOOD PRESSURE: 74 MMHG

## 2022-04-13 DIAGNOSIS — F32.1 MODERATE SINGLE CURRENT EPISODE OF MAJOR DEPRESSIVE DISORDER (H): ICD-10-CM

## 2022-04-13 DIAGNOSIS — Z87.898 HISTORY OF CHEST PAIN: ICD-10-CM

## 2022-04-13 DIAGNOSIS — Z01.818 PREOP GENERAL PHYSICAL EXAM: Primary | ICD-10-CM

## 2022-04-13 DIAGNOSIS — M16.11 PRIMARY OSTEOARTHRITIS OF RIGHT HIP: ICD-10-CM

## 2022-04-13 PROCEDURE — 93000 ELECTROCARDIOGRAM COMPLETE: CPT | Performed by: FAMILY MEDICINE

## 2022-04-13 PROCEDURE — 99215 OFFICE O/P EST HI 40 MIN: CPT | Performed by: FAMILY MEDICINE

## 2022-04-13 ASSESSMENT — ANXIETY QUESTIONNAIRES
1. FEELING NERVOUS, ANXIOUS, OR ON EDGE: SEVERAL DAYS
5. BEING SO RESTLESS THAT IT IS HARD TO SIT STILL: SEVERAL DAYS
7. FEELING AFRAID AS IF SOMETHING AWFUL MIGHT HAPPEN: SEVERAL DAYS
2. NOT BEING ABLE TO STOP OR CONTROL WORRYING: SEVERAL DAYS
6. BECOMING EASILY ANNOYED OR IRRITABLE: SEVERAL DAYS
GAD7 TOTAL SCORE: 7
3. WORRYING TOO MUCH ABOUT DIFFERENT THINGS: SEVERAL DAYS
IF YOU CHECKED OFF ANY PROBLEMS ON THIS QUESTIONNAIRE, HOW DIFFICULT HAVE THESE PROBLEMS MADE IT FOR YOU TO DO YOUR WORK, TAKE CARE OF THINGS AT HOME, OR GET ALONG WITH OTHER PEOPLE: NOT DIFFICULT AT ALL

## 2022-04-13 ASSESSMENT — PATIENT HEALTH QUESTIONNAIRE - PHQ9
SUM OF ALL RESPONSES TO PHQ QUESTIONS 1-9: 5
5. POOR APPETITE OR OVEREATING: SEVERAL DAYS

## 2022-04-13 ASSESSMENT — PAIN SCALES - GENERAL: PAINLEVEL: MODERATE PAIN (4)

## 2022-04-13 NOTE — PATIENT INSTRUCTIONS
You may take sertraline on morning of surgery if they are scheduled to be taken then. Take only with a small sip of water.  All other scheduled medication may be held on morning of surgery, and resumed when you are allowed to eat.      Do not take Ibuprofen, Aspirin or Naproxen from now until after procedure.  If you need to take something for pain, take Acetaminophen 325 mg orally 1-2 tabs every 4-6 hrs as needed for pain     Schedule stress echocardiogram for evaluation of your heart condition in preparation for your surgery.  Contact Cardiac Services  at [phone], to schedule this appointment.     Preparing for Your Surgery  Getting started  A nurse will call you to review your health history and instructions. They will give you an arrival time based on your scheduled surgery time. Please be ready to share:  Your doctor's clinic name and phone number  Your medical, surgical and anesthesia history  A list of allergies and sensitivities  A list of medicines, including herbal treatments and over-the-counter drugs  Whether the patient has a legal guardian (ask how to send us the papers in advance)  Please tell us if you're pregnant--or if there's any chance you might be pregnant. Some surgeries may injure a fetus (unborn baby), so they require a pregnancy test. Surgeries that are safe for a fetus don't always need a test, and you can choose whether to have one.   If you have a child who's having surgery, please ask for a copy of Preparing for Your Child's Surgery.    Preparing for surgery  Within 30 days of surgery: Have a pre-op exam (sometimes called an H&P, or History and Physical). This can be done at a clinic or pre-operative center.  If you're having a , you may not need this exam. Talk to your care team.  At your pre-op exam, talk to your care team about all medicines you take. If you need to stop any medicines before surgery, ask when to start taking them again.  We do this for your  safety. Many medicines can make you bleed too much during surgery. Some change how well surgery (anesthesia) drugs work.  Call your insurance company to let them know you're having surgery. (If you don't have insurance, call [phone].)  Call your clinic if there's any change in your health. This includes signs of a cold or flu (sore throat, runny nose, cough, rash, fever). It also includes a scrape or scratch near the surgery site.  If you have questions on the day of surgery, call your hospital or surgery center.  COVID testing  You may need to be tested for COVID-19 before having surgery. If so, your surgical team will give you instructions for scheduling this test, separate from your preoperative history and physical.  Eating and drinking guidelines  For your safety: Unless your surgeon tells you otherwise, follow the guidelines below.  Eat and drink as usual until 8 hours before surgery. After that, no food or milk.  Drink clear liquids until 2 hours before surgery. These are liquids you can see through, like water, Gatorade and Propel Water. You may also have black coffee and tea (no cream or milk).  Nothing by mouth within 2 hours of surgery. This includes gum, candy and breath mints.  If you drink alcohol: Stop drinking it the night before surgery.  If your care team tells you to take medicine on the morning of surgery, it's okay to take it with a sip of water.  Preventing infection  Shower or bathe the night before and morning of your surgery. Follow the instructions your clinic gave you. (If no instructions, use regular soap.)  Don't shave or clip hair near your surgery site. We'll remove the hair if needed.  Don't smoke or vape the morning of surgery. You may chew nicotine gum up to 2 hours before surgery. A nicotine patch is okay.  Note: Some surgeries require you to completely quit smoking and nicotine. Check with your surgeon.  Your care team will make every effort to keep you safe from infection.  We will:  Clean our hands often with soap and water (or an alcohol-based hand rub).  Clean the skin at your surgery site with a special soap that kills germs.  Give you a special gown to keep you warm. (Cold raises the risk of infection.)  Wear special hair covers, masks, gowns and gloves during surgery.  Give antibiotic medicine, if prescribed. Not all surgeries need antibiotics.  What to bring on the day of surgery  Photo ID and insurance card  Copy of your health care directive, if you have one  Glasses and hearing aides (bring cases)  You can't wear contacts during surgery  Inhaler and eye drops, if you use them (tell us about these when you arrive)  CPAP machine or breathing device, if you use them  A few personal items, if spending the night  If you have . . .  A pacemaker, ICD (cardiac defibrillator) or other implant: Bring the ID card.  An implanted stimulator: Bring the remote control.  A legal guardian: Bring a copy of the certified (court-stamped) guardianship papers.  Please remove any jewelry, including body piercings. Leave jewelry and other valuables at home.  If you're going home the day of surgery  You must have a responsible adult drive you home. They should stay with you overnight as well.  If you don't have someone to stay with you, and you aren't safe to go home alone, we may keep you overnight. Insurance often won't pay for this.  After surgery  If it's hard to control your pain or you need more pain medicine, please call your surgeon's office.  Questions?   If you have any questions for your care team, list them here: _________________________________________________________________________________________________________________________________________________________________________ ____________________________________ ____________________________________ ____________________________________  For informational purposes only. Not to replace the advice of your health care provider. Copyright    2003, 2019 [facility]. All rights reserved. Clinically reviewed by Carmen Arzola MD. Critique^It [id number] - REV 07/21.

## 2022-04-13 NOTE — TELEPHONE ENCOUNTER
[facility] Call Center    Phone Message    May a detailed message be left on voicemail: yes     Reason for Call: Appointment Intake    Referring Provider Name:    Diagnosis and/or Symptoms: ASAP echo stress test     Patient called to schedule echo stress test, writer tried to call back line to schedule.No answer. Please call patient asap to schedule in [place].     Action Taken: Message routed to:  [facility] ([facility] Cardio     Travel Screening: Not Applicable

## 2022-04-13 NOTE — PROGRESS NOTES
"[facility]  [address] [address]  Phone: [phone]  Primary Provider: No Fierro  Pre-op Performing Provider: INGRID DOWELL      PREOPERATIVE EVALUATION:  Today's date: 4/13/2022    Daniel Schultz is a 57 year old male who presents for a preoperative evaluation.    Surgical Information:  Surgery/Procedure: Right total hip replacement  Surgery Location: [facility]  Surgeon: Dr. Mensah  Surgery Date: 4/25/22  Time of Surgery: TBD  Where patient plans to recover: At home with family  Fax number for surgical facility: [phone]    Type of Anesthesia Anticipated: to be determined    Assessment & Plan     The proposed surgical procedure is considered INTERMEDIATE risk.    Preop general physical exam  - EKG 12-lead complete w/read - Clinics  - Echocardiogram Exercise Stress    Primary osteoarthritis of right hip    Moderate single current episode of major depressive disorder (H)  Stable. Continue sertraline with no changes.    History of chest pain  Reviewed previous evals withpt.  He hasnot completed planned stress testing in the summer of 2021. Has been asymptomatic since then.  Patient was advised of previous EKGs. The last EKG showed \"possible old anterior infarct\" but tracing did not show Q waves on limb leads nor ST-T changes..  Due to nature of planned procedure, should rule out occult cardiac disease to risk stratify patient.   Patient concurred with plan for pursuing stress echo.  Refer to cardiology if abnormal testing.  - EKG 12-lead complete w/read - Clinics  - Echocardiogram Exercise Stress           Risks and Recommendations:  The patient has the following additional risks and recommendations for perioperative complications:   - Consult Hospitalist / IM to assist with post-op medical management  Cardiovascular:   - Pre-op stress imaging recommended due to previously incomplete work up that would warrant " stress testing regardless of preoperative status    Medication Instructions:  Patient is to take all scheduled medications on the day of surgery    RECOMMENDATION:  APPROVAL GIVEN to proceed with proposed procedure pending review of diagnostic evaluation. If abnormal stress echo, needs preop eval with cardiology prior to surgery.    Subjective     HPI related to upcoming procedure: Patient has chronic right hip pain due to osteoarthritis. Hence, planned surgery. Reviewed above with patient.    Preop Questions 4/13/2022   1. Have you ever had a heart attack or stroke? No   2. Have you ever had surgery on your heart or blood vessels, such as a stent placement, a coronary artery bypass, or surgery on an artery in your head, neck, heart, or legs? No   3. Do you have chest pain with activity? No   4. Do you have a history of  heart failure? No   5. Do you currently have a cold, bronchitis or symptoms of other infection? No   6. Do you have a cough, shortness of breath, or wheezing? No   7. Do you or anyone in your family have previous history of blood clots? No   8. Do you or does anyone in your family have a serious bleeding problem such as prolonged bleeding following surgeries or cuts? No   9. Have you ever had problems with anemia or been told to take iron pills? No   10. Have you had any abnormal blood loss such as black, tarry or bloody stools? No   11. Have you ever had a blood transfusion? No   12. Are you willing to have a blood transfusion if it is medically needed before, during, or after your surgery? Yes   13. Have you or any of your relatives ever had problems with anesthesia? No   14. Do you have sleep apnea, excessive snoring or daytime drowsiness? No   15. Do you have any artifical heart valves or other implanted medical devices like a pacemaker, defibrillator, or continuous glucose monitor? No   16. Do you have artificial joints? YES - TKR on right    17. Are you allergic to latex? No       Health Care  Directive:  Patient does not have a Health Care Directive or Living Will: Discussed advance care planning with patient; information given to patient to review.    Preoperative Review of :   reviewed - no record of controlled substances prescribed.      Status of Chronic Conditions:  See problem list for active medical problems.  Problems all longstanding and stable, except as noted/documented.  See ROS for pertinent symptoms related to these conditions.    DEPRESSION - Patient has a long history of Depression of moderate severity requiring medication for control with recent symptoms being stable..Current symptoms of depression include none.       Review of Systems  CONSTITUTIONAL: NEGATIVE for fever, chills, change in weight  INTEGUMENTARY/SKIN: NEGATIVE for worrisome rashes, moles or lesions  EYES: NEGATIVE for vision changes or irritation  ENT/MOUTH: NEGATIVE for ear, mouth and throat problems  RESP: NEGATIVE for significant cough or SOB  CV: NEGATIVE for chest pain, palpitations or peripheral edema  GI: NEGATIVE for nausea, abdominal pain, heartburn, or change in bowel habits  : NEGATIVE for frequency, dysuria, or hematuria  MUSCULOSKELETAL:right hip pain  NEURO: NEGATIVE for weakness, dizziness or paresthesias  ENDOCRINE: NEGATIVE for temperature intolerance, skin/hair changes  HEME: NEGATIVE for bleeding problems  PSYCHIATRIC: NEGATIVE for changes in mood or affect    Patient Active Problem List    Diagnosis Date Noted     JOSE ALFREDO (generalized anxiety disorder) 02/26/2019     Priority: Medium     Moderate single current episode of major depressive disorder (H) 01/08/2019     Priority: Medium     JAZMIN (obstructive sleep apnea) 08/18/2015     Priority: Medium     Severe JAZMIN   - HST on 8/17/2015 with weight 240 lbs, AHI 34, chris desat 86%, strong positional component.  Events labeled as central suspected to be post-arousal and feel it is most consistent with JAZIMN  He does not wear CPAP - 1/8/2019          Pre-diabetes 07/07/2015     Priority: Medium     Nonalcoholic steatohepatitis (WILKINSON) 05/26/2015     Priority: Medium     Fatty liver seen on US.  Had mild transaminitis       Meniscus tear 09/25/2013     Priority: Medium     Hyperlipidemia LDL goal <160 12/21/2010     Priority: Medium      Past Medical History:   Diagnosis Date     Moderate single current episode of major depressive disorder (H)      Past Surgical History:   Procedure Laterality Date     ARTHROSCOPY ANKLE, OPEN REPAIR LIGAMENT, COMBINED Right 1/5/2017    Procedure: COMBINED ARTHROSCOPY ANKLE, OPEN REPAIR LIGAMENT;  Surgeon: Avinash Dick DPM;  Location: [facility]     ARTHROSCOPY KNEE IRRIGATION AND DEBRIDEMENT  9/27/2013    Procedure: ARTHROSCOPY KNEE IRRIGATION AND DEBRIDEMENT;;  Surgeon: Ley, Jeffrey Duane, MD;  Location: [facility]     ARTHROSCOPY KNEE WITH MEDIAL MENISCECTOMY  9/27/2013    Procedure: ARTHROSCOPY KNEE WITH MEDIAL MENISCECTOMY;  Right Knee Arthroscopy Partial Menisectomy and Debridement,Left Knee Arthroscopy Debridement and Loose Body Removal--needs assist.;  Surgeon: Ley, Jeffrey Duane, MD;  Location: [facility]     COLONOSCOPY N/A 8/17/2016    Procedure: COLONOSCOPY;  Surgeon: Rudolph Nazario MD;  Location: [facility]     HEMORRHOIDECTOMY  2000    Internal and external hemorrhoidectomy     REPAIR TENDON ANKLE Right 1/25/2018    Procedure: REPAIR TENDON ANKLE;  Right Ankle Posterior Tibial Tendon Repair,Peroneus Brevis Repair, Medial Ligament Repair;  Surgeon: Avinash Dick DPM;  Location: [facility]     REPAIR TENDON FOOT Right 4/26/2018    Procedure: REPAIR TENDON FOOT;  Right insertional posterior tibial tendon repair and tendon transfer.;  Surgeon: Avinash Dick DPM;  Location: [facility]     SURGICAL HISTORY OF -   6/2001    Left Achilles tendon repair     Current Outpatient Medications   Medication Sig Dispense Refill     sertraline (ZOLOFT) 50 MG tablet Take 3 tablets (150 mg) by mouth daily 270 tablet 3       Allergies  "  Allergen Reactions     Nkda [No Known Drug Allergies]         Social History     Tobacco Use     Smoking status: Never Smoker     Smokeless tobacco: Never Used   Substance Use Topics     Alcohol use: Yes     Alcohol/week: 0.0 standard drinks     Comment: Occasional     Family History   Problem Relation Age of Onset     Aneurysm Mother      Depression Father      History   Drug Use No         Objective     /74 (BP Location: Left arm, Patient Position: Chair, Cuff Size: Adult Large)   Pulse 79   Temp 97.3  F (36.3  C) (Tympanic)   Resp 12   Ht 1.765 m (5' 9.5\")   Wt 97.2 kg (214 lb 3.2 oz)   SpO2 98%   BMI 31.18 kg/m      Physical Exam  GENERAL APPEARANCE:  alert and no distress; ambulatory w/o assist  EYES: pink conj, no icterus, PERRL, EOMI  HENT: ear canals and TM's normal, nose and mouth without ulcers or lesions, oropharynx clear and oral mucous membranes moist  NECK: no adenopathy, no asymmetry, masses, or scars and thyroid normal to palpation  RESP: lungs clear to auscultation - no rales, rhonchi or wheezes  CV: regular rates and rhythm, normal S1 S2, no S3 or S4, no murmur, click or rub, no peripheral edema and peripheral pulses strong  ABDOMEN: soft, nontender, no hepatosplenomegaly, no masses and bowel sounds normal  MS: no musculoskeletal defects are noted and gait is age appropriate without ataxia  SKIN: good turgor, no rash/jaundice/ecchymosis  NEURO: Normal strength and tone, sensory exam grossly normal, mentation intact and speech normal    Recent Labs   Lab Test 07/24/21  0928 07/21/21  1845 03/29/21  1302 09/29/20  1016   HGB 15.5 16.1  --  16.1    221  --  238    138   < > 136   POTASSIUM 4.4 3.8   < > 3.9   CR 1.08 0.98   < > 1.10   A1C  --   --   --  6.2*    < > = values in this interval not displayed.        Diagnostics:  No labs were ordered during this visit.   EKG required for previous chest pain with incomplete evaluation and not completed in the last 90 days. "   EKG: sinus bradycardia, normal axis, normal intervals, no acute ST/T changes c/w ischemia, no LVH by voltage criteria, compared to previous tracing there is no signfiicant difference in the QRS complexes or ST-T segments    Revised Cardiac Risk Index (RCRI):  The patient has the following serious cardiovascular risks for perioperative complications:   - No serious cardiac risks = 0 points     RCRI Interpretation: 0 points: Class I (very low risk - 0.4% complication rate)           Signed Electronically by: Fidel Hernandez MD  Copy of this evaluation report is provided to requesting physician.

## 2022-04-14 ASSESSMENT — ANXIETY QUESTIONNAIRES: GAD7 TOTAL SCORE: 7

## 2022-04-14 NOTE — TELEPHONE ENCOUNTER
[facility] Call Center    Phone Message    May a detailed message be left on voicemail: yes     Reason for Call: Appointment Intake    Referring Provider Name: Dr. Hernandez  Diagnosis and/or Symptoms: Echocardiogram Exercise Stress    Writer tried calling priority line multiple times with no answer.     Action Taken: Message routed to:  Other: [place]    Travel Screening: Not Applicable

## 2022-04-15 ENCOUNTER — TELEPHONE (OUTPATIENT)
Dept: FAMILY MEDICINE | Facility: CLINIC | Age: 57
End: 2022-04-15
Payer: COMMERCIAL

## 2022-04-15 DIAGNOSIS — Z01.818 PREOP GENERAL PHYSICAL EXAM: Primary | ICD-10-CM

## 2022-04-15 NOTE — TELEPHONE ENCOUNTER
Reason for Call:  Labs.      Detailed comments: Debbie from [facility] calling to ask if some labs (CBC and BMP)  can be ordered and done before patient's surgery on 04/25.      Phone Number Patient can be reached at: [phone]    Can we leave a detailed message on this number? Yes  Call taken on 4/15/2022 at 3:31 PM by Diane Shook

## 2022-04-18 NOTE — TELEPHONE ENCOUNTER
Dr Vadim ANTONIO is requesting a CBC and BMP to be drawn before surgery on 4/25/22  Pended below for your consideration.

## 2022-04-19 NOTE — TELEPHONE ENCOUNTER
Labs have been ordered.    Patient has also been advised to schedule a stress echocardiogram prior to surgery which still has to be done.

## 2022-04-20 ENCOUNTER — LAB (OUTPATIENT)
Dept: LAB | Facility: CLINIC | Age: 57
End: 2022-04-20
Payer: COMMERCIAL

## 2022-04-20 ENCOUNTER — TELEPHONE (OUTPATIENT)
Dept: FAMILY MEDICINE | Facility: CLINIC | Age: 57
End: 2022-04-20
Payer: COMMERCIAL

## 2022-04-20 DIAGNOSIS — Z01.818 PREOP GENERAL PHYSICAL EXAM: ICD-10-CM

## 2022-04-20 LAB
ANION GAP SERPL CALCULATED.3IONS-SCNC: 3 MMOL/L (ref 3–14)
BUN SERPL-MCNC: 10 MG/DL (ref 7–30)
CALCIUM SERPL-MCNC: 8.6 MG/DL (ref 8.5–10.1)
CHLORIDE BLD-SCNC: 105 MMOL/L (ref 94–109)
CO2 SERPL-SCNC: 30 MMOL/L (ref 20–32)
CREAT SERPL-MCNC: 1.04 MG/DL (ref 0.66–1.25)
ERYTHROCYTE [DISTWIDTH] IN BLOOD BY AUTOMATED COUNT: 12.5 % (ref 10–15)
GFR SERPL CREATININE-BSD FRML MDRD: 84 ML/MIN/1.73M2
GLUCOSE BLD-MCNC: 112 MG/DL (ref 70–99)
HCT VFR BLD AUTO: 44.9 % (ref 40–53)
HGB BLD-MCNC: 14.9 G/DL (ref 13.3–17.7)
MCH RBC QN AUTO: 30.5 PG (ref 26.5–33)
MCHC RBC AUTO-ENTMCNC: 33.2 G/DL (ref 31.5–36.5)
MCV RBC AUTO: 92 FL (ref 78–100)
PLATELET # BLD AUTO: 194 10E3/UL (ref 150–450)
POTASSIUM BLD-SCNC: 4 MMOL/L (ref 3.4–5.3)
RBC # BLD AUTO: 4.88 10E6/UL (ref 4.4–5.9)
SODIUM SERPL-SCNC: 138 MMOL/L (ref 133–144)
WBC # BLD AUTO: 7.2 10E3/UL (ref 4–11)

## 2022-04-20 PROCEDURE — 80048 BASIC METABOLIC PNL TOTAL CA: CPT

## 2022-04-20 PROCEDURE — 85027 COMPLETE CBC AUTOMATED: CPT

## 2022-04-20 PROCEDURE — 36415 COLL VENOUS BLD VENIPUNCTURE: CPT

## 2022-04-21 ENCOUNTER — HOSPITAL ENCOUNTER (OUTPATIENT)
Dept: CARDIOLOGY | Facility: CLINIC | Age: 57
Discharge: HOME | End: 2022-04-21
Attending: FAMILY MEDICINE | Admitting: FAMILY MEDICINE
Payer: COMMERCIAL

## 2022-04-21 DIAGNOSIS — Z01.818 PREOP GENERAL PHYSICAL EXAM: ICD-10-CM

## 2022-04-21 DIAGNOSIS — Z87.898 HISTORY OF CHEST PAIN: ICD-10-CM

## 2022-04-21 PROCEDURE — 93018 CV STRESS TEST I&R ONLY: CPT | Performed by: STUDENT IN AN ORGANIZED HEALTH CARE EDUCATION/TRAINING PROGRAM

## 2022-04-21 PROCEDURE — 93325 DOPPLER ECHO COLOR FLOW MAPG: CPT | Mod: 26 | Performed by: STUDENT IN AN ORGANIZED HEALTH CARE EDUCATION/TRAINING PROGRAM

## 2022-04-21 PROCEDURE — 255N000002 HC RX 255 OP 636: Performed by: STUDENT IN AN ORGANIZED HEALTH CARE EDUCATION/TRAINING PROGRAM

## 2022-04-21 PROCEDURE — 93016 CV STRESS TEST SUPVJ ONLY: CPT | Performed by: STUDENT IN AN ORGANIZED HEALTH CARE EDUCATION/TRAINING PROGRAM

## 2022-04-21 PROCEDURE — 93350 STRESS TTE ONLY: CPT | Mod: 26 | Performed by: STUDENT IN AN ORGANIZED HEALTH CARE EDUCATION/TRAINING PROGRAM

## 2022-04-21 PROCEDURE — 93321 DOPPLER ECHO F-UP/LMTD STD: CPT | Mod: 26 | Performed by: STUDENT IN AN ORGANIZED HEALTH CARE EDUCATION/TRAINING PROGRAM

## 2022-04-21 RX ADMIN — PERFLUTREN 5 ML: 6.52 INJECTION, SUSPENSION INTRAVENOUS at 08:14

## 2022-04-21 NOTE — PROGRESS NOTES
Patient's stress echocardiogram was reported normal and low-risk.    Patient may proceed with planned orthopedic surgery with no further diagnostic evaluation.    He may take sertraline on day of surgery with a small sip of water.    Do not take Ibuprofen, Aspirin or Naproxen from now until after procedure.  If you need to take something for pain, take Acetaminophen 325 mg orally 1-2 tabs every 4-6 hrs as needed for pain.

## 2022-04-27 NOTE — TELEPHONE ENCOUNTER
Called patient to schedule follow up and she was driving. The patient will call us back later this afternoon to schedule. Patient notified of letter faxed today via my chart message.    Oneyda PARIS Rn

## 2022-05-09 ENCOUNTER — TRANSFERRED RECORDS (OUTPATIENT)
Dept: HEALTH INFORMATION MANAGEMENT | Facility: CLINIC | Age: 57
End: 2022-05-09
Payer: COMMERCIAL

## 2022-06-06 ENCOUNTER — TRANSFERRED RECORDS (OUTPATIENT)
Dept: HEALTH INFORMATION MANAGEMENT | Facility: CLINIC | Age: 57
End: 2022-06-06
Payer: COMMERCIAL

## 2022-09-27 ENCOUNTER — TRANSCRIBE ORDERS (OUTPATIENT)
Dept: OTHER | Age: 57
End: 2022-09-27

## 2022-09-27 DIAGNOSIS — Z96.652 S/P TOTAL KNEE ARTHROPLASTY, LEFT: Primary | ICD-10-CM

## 2022-10-03 ENCOUNTER — OFFICE VISIT (OUTPATIENT)
Dept: FAMILY MEDICINE | Facility: CLINIC | Age: 57
End: 2022-10-03
Payer: COMMERCIAL

## 2022-10-03 VITALS
RESPIRATION RATE: 14 BRPM | OXYGEN SATURATION: 97 % | WEIGHT: 219.5 LBS | HEIGHT: 70 IN | TEMPERATURE: 97.5 F | DIASTOLIC BLOOD PRESSURE: 80 MMHG | BODY MASS INDEX: 31.42 KG/M2 | HEART RATE: 75 BPM | SYSTOLIC BLOOD PRESSURE: 124 MMHG

## 2022-10-03 DIAGNOSIS — R73.03 PRE-DIABETES: ICD-10-CM

## 2022-10-03 DIAGNOSIS — G47.33 OSA (OBSTRUCTIVE SLEEP APNEA): ICD-10-CM

## 2022-10-03 DIAGNOSIS — M17.12 PRIMARY OSTEOARTHRITIS OF LEFT KNEE: ICD-10-CM

## 2022-10-03 DIAGNOSIS — Z01.818 PREOP GENERAL PHYSICAL EXAM: Primary | ICD-10-CM

## 2022-10-03 LAB
ANION GAP SERPL CALCULATED.3IONS-SCNC: 11 MMOL/L (ref 7–15)
BUN SERPL-MCNC: 10.1 MG/DL (ref 6–20)
CALCIUM SERPL-MCNC: 9.1 MG/DL (ref 8.6–10)
CHLORIDE SERPL-SCNC: 106 MMOL/L (ref 98–107)
CREAT SERPL-MCNC: 1.11 MG/DL (ref 0.67–1.17)
DEPRECATED HCO3 PLAS-SCNC: 24 MMOL/L (ref 22–29)
ERYTHROCYTE [DISTWIDTH] IN BLOOD BY AUTOMATED COUNT: 12.2 % (ref 10–15)
GFR SERPL CREATININE-BSD FRML MDRD: 77 ML/MIN/1.73M2
GLUCOSE SERPL-MCNC: 149 MG/DL (ref 70–99)
HBA1C MFR BLD: 6.3 % (ref 0–5.6)
HCT VFR BLD AUTO: 44.4 % (ref 40–53)
HGB BLD-MCNC: 15.2 G/DL (ref 13.3–17.7)
MCH RBC QN AUTO: 30.3 PG (ref 26.5–33)
MCHC RBC AUTO-ENTMCNC: 34.2 G/DL (ref 31.5–36.5)
MCV RBC AUTO: 88 FL (ref 78–100)
PLATELET # BLD AUTO: 233 10E3/UL (ref 150–450)
POTASSIUM SERPL-SCNC: 4.5 MMOL/L (ref 3.4–5.3)
RBC # BLD AUTO: 5.02 10E6/UL (ref 4.4–5.9)
SODIUM SERPL-SCNC: 141 MMOL/L (ref 136–145)
WBC # BLD AUTO: 5.6 10E3/UL (ref 4–11)

## 2022-10-03 PROCEDURE — 83036 HEMOGLOBIN GLYCOSYLATED A1C: CPT | Performed by: FAMILY MEDICINE

## 2022-10-03 PROCEDURE — 36415 COLL VENOUS BLD VENIPUNCTURE: CPT | Performed by: FAMILY MEDICINE

## 2022-10-03 PROCEDURE — 85027 COMPLETE CBC AUTOMATED: CPT | Performed by: FAMILY MEDICINE

## 2022-10-03 PROCEDURE — 90471 IMMUNIZATION ADMIN: CPT | Performed by: FAMILY MEDICINE

## 2022-10-03 PROCEDURE — 99214 OFFICE O/P EST MOD 30 MIN: CPT | Mod: 25 | Performed by: FAMILY MEDICINE

## 2022-10-03 PROCEDURE — 90682 RIV4 VACC RECOMBINANT DNA IM: CPT | Performed by: FAMILY MEDICINE

## 2022-10-03 PROCEDURE — 80048 BASIC METABOLIC PNL TOTAL CA: CPT | Performed by: FAMILY MEDICINE

## 2022-10-03 ASSESSMENT — PATIENT HEALTH QUESTIONNAIRE - PHQ9
10. IF YOU CHECKED OFF ANY PROBLEMS, HOW DIFFICULT HAVE THESE PROBLEMS MADE IT FOR YOU TO DO YOUR WORK, TAKE CARE OF THINGS AT HOME, OR GET ALONG WITH OTHER PEOPLE: NOT DIFFICULT AT ALL
SUM OF ALL RESPONSES TO PHQ QUESTIONS 1-9: 4
SUM OF ALL RESPONSES TO PHQ QUESTIONS 1-9: 4

## 2022-10-03 ASSESSMENT — PAIN SCALES - GENERAL: PAINLEVEL: SEVERE PAIN (7)

## 2022-10-03 NOTE — PATIENT INSTRUCTIONS
Before Your Procedure or Hospital Admission  Testing for COVID-19  Thank you for choosing [facility] for your health care needs. Our goal is to keep you and our team here at [facility] safe and healthy. We've taken many steps to make this happen. For example:    We test and screen our staff, care teams and patients for COVID-19.    Everyone at [facility] must wear a mask.    We are limiting hospital and clinic visitors.  Before you come in, you must get tested for COVID-19, even if you've been vaccinated.   What kind of COVID-19 test should I have?  At-home rapid antigen test    You must have a rapid-antigen test 1 to 2 days before your procedure (surgery) if you are:  ? Over age 2 and  ? Planning to go home the same day as your procedure (surgery)    Rapid antigen tests are not recommended for children age 2 and under. These patients should schedule a PCR test unless you have otherwise discussed performing an at-home antigen test with your surgeon.    You can buy this test at many pharmacy stores. Or, you may order a free test at covid.gov/tests.    If your test is negative: please take a photo of the test. Show the nurse the photo when you come in for your procedure. We can't accept rapid antigen tests that are more than 2 days old.  PCR lab test     You must have a PCR test in a lab within 4 days of your procedure (surgery) if you are:  ? Age 2 or under (unless your surgeon gives you different instructions).  ? Planning to stay overnight in the hospital  ? Unable to find an at-home test.    While you don't have to use our lab, we recommend it. You can get your results more quickly and easily this way. To schedule a test at an [facility] lab, please call [phone]. Or, visit [email or web address].    If you have your test somewhere else, ask the testing location to fax your results to [phone].  ? If we don't get your results within 4 days of your procedure  (surgery), we may have to delay or cancel your treatment.  ? We can't accept PCR tests that are more than 4 days old.  If your test shows you have COVID-19  If your test is positive, tell your surgeon's office right away. A positive test means that you have COVID-19.  We'll probably have to postpone your admission, surgery or procedure. Your care team will discuss this with you. We'll let you know what to do and when you can reschedule.  If your test shows you DON'T have COVID-19  A negative test result means you don't have COVID-19, but it is still possible that you might get it. It's rare, but sometimes the test result is wrong. Or, you could catch the virus after taking the test. There is also a very small chance that you could catch COVID-19 in the hospital or surgery center. [facility] has taken many steps to prevent this from happening.   To reduce your risk of getting COVID-19 before your procedure (surgery), follow the precautions below.  COVID-19 precautions  After your test and before your procedure, please follow these safety guidelines:    Limit trips outside your home.    Limit the number of people you see.    Always wear a face covering outside your home.    Use social distancing. Stay 6 feet away from others whenever you can.    Wash your hands often.  Possible surgery delay   Like you, we want your surgery to happen when it's scheduled. But sometimes the hospital is so full that it's not safe for you to have your surgery. This is especially true during the pandemic. Your surgery may need to be rescheduled to a later date. If this happens, we will call and tell you.   Day of your surgery or procedure    Please come wearing a face covering that covers both your nose and mouth.    When you arrive, we'll ask you some questions to find out if you've had any exposures to COVID-19 or have any signs of COVID-19.    Ask your care team if you can have visitors. All visitors must wear face coverings and  "will be screened for exposure to, or signs of, COVID-19.  ? The rules for visitors change often, depending on how much the virus is spreading. To learn more, see \"Visiting a Loved One in the Hospital during the COVID-19 Outbreak,\" at: www.[employer]/991029.pdf.   Please call your care team, hospital or surgery center if you have any questions. We thank you for your understanding and for choosing [facility] for your care.     For informational purposes only. Not to replace the advice of your health care provider. Copyright   2020 Health system. All rights reserved. Clinically reviewed by Infection Prevention and the [facility] COVID-19 Clinical Team. [employer] 342622 - Rev 08/01/22.    "

## 2022-10-03 NOTE — PROGRESS NOTES
[facility]  [address]  [facility] [address]  Phone: [phone]  Primary Provider: No Fierro  Pre-op Performing Provider: JAIRO GLEASON      PREOPERATIVE EVALUATION:  Today's date: 10/3/2022    Daniel Schultz is a 57 year old male who presents for a preoperative evaluation.    Surgical Information:  Surgery/Procedure: S/P total knee arthroplasty, left  Surgery Location: [facility]  Surgeon: Rodrick  Surgery Date: 10/24/2022  Time of Surgery: TBD  Where patient plans to recover: At home with family  Fax number for surgical facility: [phone]    Type of Anesthesia Anticipated: General    Assessment & Plan     The proposed surgical procedure is considered INTERMEDIATE risk.    Preop general physical exam       Primary osteoarthritis of left knee       JAZMIN (obstructive sleep apnea)   no CPAP    Pre-diabetes  Check HgbA1c today    Depression/anxiety  Patient not taking sertraline regularly - hasn't been refilled since 2019 by PCP           Risks and Recommendations:  The patient has the following additional risks and recommendations for perioperative complications:   - No identified additional risk factors other than previously addressed    Medication Instructions:  Patient is to take all scheduled medications on the day of surgery    RECOMMENDATION:  APPROVAL GIVEN to proceed with proposed procedure, without further diagnostic evaluation.                      Subjective     HPI related to upcoming procedure: left knee osteoarthritis, scheduled for TKA.    Previously has had his right knee and hip replaced.    He has JAZMIN and does NOT use CPAP  Lab work in the past has shown he is pre-diabetic, will check labs today          Preop Questions 10/3/2022   1. Have you ever had a heart attack or stroke? No   2. Have you ever had surgery on your heart or blood vessels, such as a stent placement, a coronary artery bypass, or surgery on an artery in your  head, neck, heart, or legs? No   3. Do you have chest pain with activity? No   4. Do you have a history of  heart failure? No   5. Do you currently have a cold, bronchitis or symptoms of other infection? No   6. Do you have a cough, shortness of breath, or wheezing? No   7. Do you or anyone in your family have previous history of blood clots? No   8. Do you or does anyone in your family have a serious bleeding problem such as prolonged bleeding following surgeries or cuts? No   9. Have you ever had problems with anemia or been told to take iron pills? No   10. Have you had any abnormal blood loss such as black, tarry or bloody stools? No   11. Have you ever had a blood transfusion? No   12. Are you willing to have a blood transfusion if it is medically needed before, during, or after your surgery? Yes   13. Have you or any of your relatives ever had problems with anesthesia? No   14. Do you have sleep apnea, excessive snoring or daytime drowsiness? No   15. Do you have any artifical heart valves or other implanted medical devices like a pacemaker, defibrillator, or continuous glucose monitor? No   16. Do you have artificial joints? YES - right knee and hip   17. Are you allergic to latex? No     Health Care Directive:  Patient does not have a Health Care Directive or Living Will: Discussed advance care planning with patient; however, patient declined at this time.    Preoperative Review of :   reviewed - no record of controlled substances prescribed.      Status of Chronic Conditions:  See problem list for active medical problems.  Problems all longstanding and stable, except as noted/documented.  See ROS for pertinent symptoms related to these conditions.      Review of Systems  CONSTITUTIONAL: NEGATIVE for fever, chills, change in weight  ENT/MOUTH: NEGATIVE for ear, mouth and throat problems  RESP: NEGATIVE for significant cough or SOB  CV: NEGATIVE for chest pain, palpitations or peripheral  edema    Patient Active Problem List    Diagnosis Date Noted     JOSE ALFREDO (generalized anxiety disorder) 02/26/2019     Priority: Medium     Moderate single current episode of major depressive disorder (H) 01/08/2019     Priority: Medium     JAZMIN (obstructive sleep apnea) 08/18/2015     Priority: Medium     Severe JAZMIN   - HST on 8/17/2015 with weight 240 lbs, AHI 34, chris desat 86%, strong positional component.  Events labeled as central suspected to be post-arousal and feel it is most consistent with JAZMIN  He does not wear CPAP - 1/8/2019         Pre-diabetes 07/07/2015     Priority: Medium     Nonalcoholic steatohepatitis (WILKINSON) 05/26/2015     Priority: Medium     Fatty liver seen on US.  Had mild transaminitis       Meniscus tear 09/25/2013     Priority: Medium     Hyperlipidemia LDL goal <160 12/21/2010     Priority: Medium      Past Medical History:   Diagnosis Date     Moderate single current episode of major depressive disorder (H)      Past Surgical History:   Procedure Laterality Date     ARTHROSCOPY ANKLE, OPEN REPAIR LIGAMENT, COMBINED Right 1/5/2017    Procedure: COMBINED ARTHROSCOPY ANKLE, OPEN REPAIR LIGAMENT;  Surgeon: Avinash Dick DPM;  Location: [facility]     ARTHROSCOPY KNEE IRRIGATION AND DEBRIDEMENT  9/27/2013    Procedure: ARTHROSCOPY KNEE IRRIGATION AND DEBRIDEMENT;;  Surgeon: Ley, Jeffrey Duane, MD;  Location: [facility]     ARTHROSCOPY KNEE WITH MEDIAL MENISCECTOMY  9/27/2013    Procedure: ARTHROSCOPY KNEE WITH MEDIAL MENISCECTOMY;  Right Knee Arthroscopy Partial Menisectomy and Debridement,Left Knee Arthroscopy Debridement and Loose Body Removal--needs assist.;  Surgeon: Ley, Jeffrey Duane, MD;  Location: [facility]     COLONOSCOPY N/A 8/17/2016    Procedure: COLONOSCOPY;  Surgeon: Rudolph Nazario MD;  Location: [facility]     HEMORRHOIDECTOMY  2000    Internal and external hemorrhoidectomy     REPAIR TENDON ANKLE Right 1/25/2018    Procedure: REPAIR TENDON ANKLE;  Right Ankle Posterior Tibial Tendon  "Repair,Peroneus Brevis Repair, Medial Ligament Repair;  Surgeon: Avinash Dick DPM;  Location: [facility]     REPAIR TENDON FOOT Right 4/26/2018    Procedure: REPAIR TENDON FOOT;  Right insertional posterior tibial tendon repair and tendon transfer.;  Surgeon: Avinash Dick DPM;  Location: [facility]     SURGICAL HISTORY OF -   6/2001    Left Achilles tendon repair     Current Outpatient Medications   Medication Sig Dispense Refill     sertraline (ZOLOFT) 50 MG tablet Take 3 tablets (150 mg) by mouth daily 270 tablet 3       Allergies   Allergen Reactions     Nkda [No Known Drug Allergies]         Social History     Tobacco Use     Smoking status: Never Smoker     Smokeless tobacco: Never Used   Substance Use Topics     Alcohol use: Yes     Alcohol/week: 0.0 standard drinks     Comment: Occasional     Family History   Problem Relation Age of Onset     Aneurysm Mother      Depression Father      History   Drug Use No         Objective     /80   Pulse 75   Temp 97.5  F (36.4  C) (Tympanic)   Resp 14   Ht 1.765 m (5' 9.5\")   Wt 99.6 kg (219 lb 8 oz)   SpO2 97%   BMI 31.95 kg/m      Physical Exam  GENERAL APPEARANCE: healthy, alert and no distress  HENT: ear canals and TM's normal and nose and mouth without ulcers or lesions  RESP: lungs clear to auscultation - no rales, rhonchi or wheezes  CV: regular rate and rhythm, normal S1 S2, no S3 or S4 and no murmur, click or rub   NEURO: Normal strength and tone, sensory exam grossly normal, mentation intact and speech normal    Recent Labs   Lab Test 04/20/22  1510 07/24/21  0928   HGB 14.9 15.5    207    137   POTASSIUM 4.0 4.4   CR 1.04 1.08      Results for orders placed or performed in visit on 10/03/22   Hemoglobin A1c     Status: Abnormal   Result Value Ref Range    Hemoglobin A1C 6.3 (H) 0.0 - 5.6 %   CBC with platelets     Status: Normal   Result Value Ref Range    WBC Count 5.6 4.0 - 11.0 10e3/uL    RBC Count 5.02 4.40 - 5.90 10e6/uL    " Hemoglobin 15.2 13.3 - 17.7 g/dL    Hematocrit 44.4 40.0 - 53.0 %    MCV 88 78 - 100 fL    MCH 30.3 26.5 - 33.0 pg    MCHC 34.2 31.5 - 36.5 g/dL    RDW 12.2 10.0 - 15.0 %    Platelet Count 233 150 - 450 10e3/uL         Diagnostics:  Labs pending at this time.  Results will be reviewed when available.   No EKG required, no history of coronary heart disease, significant arrhythmia, peripheral arterial disease or other structural heart disease.    Revised Cardiac Risk Index (RCRI):  The patient has the following serious cardiovascular risks for perioperative complications:   - No serious cardiac risks = 0 points     RCRI Interpretation: 0 points: Class I (very low risk - 0.4% complication rate)           Signed Electronically by: Britney Li MD  Copy of this evaluation report is provided to requesting physician.      Answers for HPI/ROS submitted by the patient on 10/3/2022  If you checked off any problems, how difficult have these problems made it for you to do your work, take care of things at home, or get along with other people?: Not difficult at all  PHQ9 TOTAL SCORE: 4

## 2022-10-23 ENCOUNTER — HOSPITAL ENCOUNTER (EMERGENCY)
Facility: CLINIC | Age: 57
Discharge: ANOTHER HOSPITAL | End: 2022-10-25
Attending: FAMILY MEDICINE | Admitting: FAMILY MEDICINE
Payer: COMMERCIAL

## 2022-10-23 DIAGNOSIS — T14.91XA SUICIDE ATTEMPT (H): ICD-10-CM

## 2022-10-23 LAB
ALBUMIN SERPL BCG-MCNC: 4 G/DL (ref 3.5–5.2)
ALP SERPL-CCNC: 64 U/L (ref 40–129)
ALT SERPL W P-5'-P-CCNC: 39 U/L (ref 10–50)
ANION GAP SERPL CALCULATED.3IONS-SCNC: 11 MMOL/L (ref 7–15)
APAP SERPL-MCNC: <5 UG/ML (ref 10–30)
AST SERPL W P-5'-P-CCNC: 26 U/L (ref 10–50)
BASOPHILS # BLD AUTO: 0.1 10E3/UL (ref 0–0.2)
BASOPHILS NFR BLD AUTO: 1 %
BILIRUB SERPL-MCNC: 0.5 MG/DL
BUN SERPL-MCNC: 12.7 MG/DL (ref 6–20)
CALCIUM SERPL-MCNC: 8.9 MG/DL (ref 8.6–10)
CHLORIDE SERPL-SCNC: 102 MMOL/L (ref 98–107)
CREAT SERPL-MCNC: 1.16 MG/DL (ref 0.67–1.17)
DEPRECATED HCO3 PLAS-SCNC: 25 MMOL/L (ref 22–29)
EOSINOPHIL # BLD AUTO: 0.2 10E3/UL (ref 0–0.7)
EOSINOPHIL NFR BLD AUTO: 3 %
ERYTHROCYTE [DISTWIDTH] IN BLOOD BY AUTOMATED COUNT: 12.5 % (ref 10–15)
ETHANOL SERPL-MCNC: 0.02 G/DL
GFR SERPL CREATININE-BSD FRML MDRD: 73 ML/MIN/1.73M2
GLUCOSE SERPL-MCNC: 103 MG/DL (ref 70–99)
HCT VFR BLD AUTO: 40.7 % (ref 40–53)
HGB BLD-MCNC: 14 G/DL (ref 13.3–17.7)
IMM GRANULOCYTES # BLD: 0 10E3/UL
IMM GRANULOCYTES NFR BLD: 0 %
LIPASE SERPL-CCNC: 20 U/L (ref 13–60)
LYMPHOCYTES # BLD AUTO: 2.7 10E3/UL (ref 0.8–5.3)
LYMPHOCYTES NFR BLD AUTO: 31 %
MCH RBC QN AUTO: 30.2 PG (ref 26.5–33)
MCHC RBC AUTO-ENTMCNC: 34.4 G/DL (ref 31.5–36.5)
MCV RBC AUTO: 88 FL (ref 78–100)
MONOCYTES # BLD AUTO: 0.8 10E3/UL (ref 0–1.3)
MONOCYTES NFR BLD AUTO: 9 %
NEUTROPHILS # BLD AUTO: 4.9 10E3/UL (ref 1.6–8.3)
NEUTROPHILS NFR BLD AUTO: 56 %
NRBC # BLD AUTO: 0 10E3/UL
NRBC BLD AUTO-RTO: 0 /100
PLATELET # BLD AUTO: 216 10E3/UL (ref 150–450)
POTASSIUM SERPL-SCNC: 3.6 MMOL/L (ref 3.4–5.3)
PROT SERPL-MCNC: 6.9 G/DL (ref 6.4–8.3)
RBC # BLD AUTO: 4.64 10E6/UL (ref 4.4–5.9)
SALICYLATES SERPL-MCNC: <0.5 MG/DL
SARS-COV-2 RNA RESP QL NAA+PROBE: NEGATIVE
SODIUM SERPL-SCNC: 138 MMOL/L (ref 136–145)
TSH SERPL DL<=0.005 MIU/L-ACNC: 4.41 UIU/ML (ref 0.3–4.2)
WBC # BLD AUTO: 8.6 10E3/UL (ref 4–11)

## 2022-10-23 PROCEDURE — 99285 EMERGENCY DEPT VISIT HI MDM: CPT | Mod: 25 | Performed by: FAMILY MEDICINE

## 2022-10-23 PROCEDURE — 85025 COMPLETE CBC W/AUTO DIFF WBC: CPT | Performed by: FAMILY MEDICINE

## 2022-10-23 PROCEDURE — 36415 COLL VENOUS BLD VENIPUNCTURE: CPT | Performed by: FAMILY MEDICINE

## 2022-10-23 PROCEDURE — 80179 DRUG ASSAY SALICYLATE: CPT | Performed by: FAMILY MEDICINE

## 2022-10-23 PROCEDURE — 93010 ELECTROCARDIOGRAM REPORT: CPT | Performed by: FAMILY MEDICINE

## 2022-10-23 PROCEDURE — 80143 DRUG ASSAY ACETAMINOPHEN: CPT | Performed by: FAMILY MEDICINE

## 2022-10-23 PROCEDURE — 83690 ASSAY OF LIPASE: CPT | Performed by: FAMILY MEDICINE

## 2022-10-23 PROCEDURE — 80053 COMPREHEN METABOLIC PANEL: CPT | Performed by: FAMILY MEDICINE

## 2022-10-23 PROCEDURE — 84439 ASSAY OF FREE THYROXINE: CPT | Performed by: FAMILY MEDICINE

## 2022-10-23 PROCEDURE — 84443 ASSAY THYROID STIM HORMONE: CPT | Performed by: FAMILY MEDICINE

## 2022-10-23 PROCEDURE — C9803 HOPD COVID-19 SPEC COLLECT: HCPCS | Performed by: FAMILY MEDICINE

## 2022-10-23 PROCEDURE — 93005 ELECTROCARDIOGRAM TRACING: CPT | Performed by: FAMILY MEDICINE

## 2022-10-23 PROCEDURE — 87635 SARS-COV-2 COVID-19 AMP PRB: CPT | Performed by: FAMILY MEDICINE

## 2022-10-23 PROCEDURE — 250N000013 HC RX MED GY IP 250 OP 250 PS 637: Performed by: FAMILY MEDICINE

## 2022-10-23 PROCEDURE — 82077 ASSAY SPEC XCP UR&BREATH IA: CPT | Performed by: FAMILY MEDICINE

## 2022-10-23 RX ORDER — MAGNESIUM OXIDE 400 MG/1
800 TABLET ORAL ONCE
Status: COMPLETED | OUTPATIENT
Start: 2022-10-23 | End: 2022-10-23

## 2022-10-23 RX ORDER — FOLIC ACID 1 MG/1
1 TABLET ORAL ONCE
Status: COMPLETED | OUTPATIENT
Start: 2022-10-23 | End: 2022-10-23

## 2022-10-23 RX ORDER — MULTIVITAMIN,THERAPEUTIC
1 TABLET ORAL ONCE
Status: COMPLETED | OUTPATIENT
Start: 2022-10-23 | End: 2022-10-23

## 2022-10-23 RX ADMIN — THIAMINE HCL TAB 100 MG 100 MG: 100 TAB at 23:18

## 2022-10-23 RX ADMIN — FOLIC ACID 1 MG: 1 TABLET ORAL at 23:18

## 2022-10-23 RX ADMIN — MULTIVITAMIN TABLET 1 TABLET: TABLET at 23:17

## 2022-10-23 RX ADMIN — Medication 800 MG: at 23:18

## 2022-10-23 ASSESSMENT — ENCOUNTER SYMPTOMS
CONSTIPATION: 0
HEADACHES: 0
NAUSEA: 0
PALPITATIONS: 0
SHORTNESS OF BREATH: 0
DYSPHORIC MOOD: 1
WHEEZING: 0
FEVER: 0
DYSURIA: 0
DIARRHEA: 0
DIAPHORESIS: 0
CHILLS: 0
FREQUENCY: 0
BLOOD IN STOOL: 0
VOMITING: 0
ABDOMINAL PAIN: 0
SINUS PRESSURE: 0
SORE THROAT: 0
COUGH: 0

## 2022-10-23 ASSESSMENT — ACTIVITIES OF DAILY LIVING (ADL): ADLS_ACUITY_SCORE: 35

## 2022-10-24 ENCOUNTER — TELEPHONE (OUTPATIENT)
Dept: BEHAVIORAL HEALTH | Facility: CLINIC | Age: 57
End: 2022-10-24

## 2022-10-24 LAB
AMPHETAMINES UR QL SCN: NORMAL
BARBITURATES UR QL SCN: NORMAL
BENZODIAZ UR QL SCN: NORMAL
BZE UR QL SCN: NORMAL
CANNABINOIDS UR QL SCN: NORMAL
HOLD SPECIMEN: NORMAL
OPIATES UR QL SCN: NORMAL
PCP QUAL URINE (ROCHE): NORMAL
T4 FREE SERPL-MCNC: 1.03 NG/DL (ref 0.9–1.7)

## 2022-10-24 PROCEDURE — 90791 PSYCH DIAGNOSTIC EVALUATION: CPT

## 2022-10-24 PROCEDURE — 80307 DRUG TEST PRSMV CHEM ANLYZR: CPT | Performed by: FAMILY MEDICINE

## 2022-10-24 RX ORDER — HYDROXYZINE HYDROCHLORIDE 25 MG/1
50 TABLET, FILM COATED ORAL EVERY 4 HOURS PRN
Status: DISCONTINUED | OUTPATIENT
Start: 2022-10-24 | End: 2022-10-25 | Stop reason: HOSPADM

## 2022-10-24 RX ORDER — HYDROXYZINE PAMOATE 25 MG/1
1-2 CAPSULE ORAL EVERY 4 HOURS PRN
Status: ON HOLD | COMMUNITY
Start: 2022-04-26 | End: 2022-10-27

## 2022-10-24 RX ORDER — OXYCODONE HYDROCHLORIDE 5 MG/1
1-2 TABLET ORAL EVERY 4 HOURS PRN
Status: ON HOLD | COMMUNITY
Start: 2022-04-26 | End: 2022-10-27

## 2022-10-24 ASSESSMENT — ACTIVITIES OF DAILY LIVING (ADL)
ADLS_ACUITY_SCORE: 35

## 2022-10-24 NOTE — ED PROVIDER NOTES
Emergency Department Patient Sign-out     Total time in the ED:   17 hours and 30 minutes.      Primary HPI, EXAM, and MDM:  Daniel Schultz is a 57 year old male who presents with hyperlipidemia WILKINSON, prediabetes gastroesophageal reflux and excessive whiskey tonight.  Son expressed concern that his father texted and about drinking too much and alluding to that he may be dead in the morning.     He tells me that was his intent and he was considering suicide he took both excessive alcohol at least 6 ounces of whiskey along with several pills that he believes was oxycodone and bottle mixed with a solution.  He is not sure what was in there was an older bottle.  He notes that he has been considering on and off with suicidality and has had significant stressors related to legal troubles that have been ongoing for 4 to 6 years.  He has prior depression.  He is never had chemical dependency issues in the past.  He likely uses excessive whiskey     Constitutional:       General: He is in acute distress.      Appearance: He is not diaphoretic.   HENT:      Head: Atraumatic.   Eyes:      Conjunctiva/sclera: Conjunctivae normal.   Cardiovascular:      Rate and Rhythm: Normal rate and regular rhythm.      Heart sounds: No murmur heard.  Pulmonary:      Effort: Pulmonary effort is normal. No respiratory distress.      Breath sounds: Normal breath sounds. No wheezing or rhonchi.   Abdominal:      General: There is no distension.      Palpations: There is no mass.      Tenderness: There is no abdominal tenderness. There is no guarding.   Musculoskeletal:      Cervical back: Neck supple.      Right lower leg: No edema.      Left lower leg: No edema.   Skin:     Coloration: Skin is not pale.      Findings: No rash.   Neurological:      General: No focal deficit present.      Mental Status: He is alert and oriented to person, place, and time.      Cranial Nerves: No cranial nerve deficit.      Sensory: No sensory deficit.       Motor: No weakness.   Psychiatric:         Attention and Perception: He does not perceive auditory or visual hallucinations.         Mood and Affect: Mood is depressed.         Speech: He is communicative. Speech is not delayed or tangential.         Behavior: Behavior normal. Behavior is not agitated, slowed, aggressive or hyperactive. Behavior is cooperative.         Thought Content: Thought content includes suicidal ideation. Thought content does not include homicidal ideation. Thought content includes suicidal plan. Thought content does not include homicidal plan.         Cognition and Memory: Cognition is not impaired.     MDM: Daniel Schultz is a 57 year old male who presents with intoxication and excessive alcohol use -but no obvious consistent heavy use apparently per the patient and has not had chemical dependency in the past.  His intent today with 6 ounces of whiskey plus something that was in a bottle that could have been oxycodone he tells this is a liquid but unclear if that was Percocet with Tylenol and unclear what else was in the bottle that he drank.  Evaluate with toxicology testing and likely will require medical clearance and psych admission given suicide attempt.  We will have DEC evaluate     The laboratory testing is reassuring.  Patient is pending DEC eval.  Signed out to Dr. Novak while awaiting this.  Do believe the patient likely will require stabilization stay due to the ingestions that occurred today with the intent to kill himself.      ED Course MDM:  1546.  I was told the patient is a voluntary admission for mental health evaluation and treatment.  Awaiting bed placement.    Patient Vitals for the past 24 hrs:   BP Temp Temp src Pulse Resp SpO2 Height Weight   10/24/22 1530 136/76 -- -- 60 16 97 % -- --   10/24/22 0354 (!) 129/96 98.3  F (36.8  C) Oral 96 -- -- -- --   10/24/22 0301 121/83 -- -- 65 11 95 % -- --   10/24/22 0246 -- -- -- 70 14 95 % -- --   10/24/22 0231 123/75 --  "-- 63 13 95 % -- --   10/24/22 0216 -- -- -- 63 12 95 % -- --   10/24/22 0201 120/81 -- -- 64 12 94 % -- --   10/24/22 0146 -- -- -- 63 12 95 % -- --   10/24/22 0131 118/79 -- -- 63 13 94 % -- --   10/24/22 0101 121/89 -- -- 66 8 96 % -- --   10/24/22 0046 -- -- -- 67 20 95 % -- --   10/24/22 0031 111/79 -- -- 57 (!) 0 96 % -- --   10/24/22 0000 103/77 -- -- 66 12 96 % -- --   10/23/22 2330 111/78 -- -- 64 14 94 % -- --   10/23/22 2300 112/79 -- -- 68 12 97 % -- --   10/23/22 2250 -- -- -- 70 8 95 % -- --   10/23/22 2240 -- -- -- 71 16 94 % -- --   10/23/22 2232 113/80 98.1  F (36.7  C) Oral 69 -- 97 % -- --   10/23/22 2230 106/77 -- -- 74 -- 97 % -- --   10/23/22 2210 123/83 -- Oral 81 16 97 % 1.803 m (5' 11\") 95.3 kg (210 lb)       ED Results:   Results for orders placed or performed during the hospital encounter of 10/23/22 (from the past 24 hour(s))   CBC with platelets differential     Status: None    Collection Time: 10/23/22 11:11 PM    Narrative    The following orders were created for panel order CBC with platelets differential.  Procedure                               Abnormality         Status                     ---------                               -----------         ------                     CBC with platelets and d...[[id number]]                      Final result                 Please view results for these tests on the individual orders.   Comprehensive metabolic panel     Status: Abnormal    Collection Time: 10/23/22 11:11 PM   Result Value Ref [facility]    Sodium 138 136 - 145 mmol/L    Potassium 3.6 3.4 - 5.3 mmol/L    Chloride 102 98 - 107 mmol/L    Carbon Dioxide (CO2) 25 22 - 29 mmol/L    Anion Gap 11 7 - 15 mmol/L    Urea Nitrogen 12.7 6.0 - 20.0 mg/dL    Creatinine 1.16 0.67 - 1.17 mg/dL    Calcium 8.9 8.6 - 10.0 mg/dL    Glucose 103 (H) 70 - 99 mg/dL    Alkaline Phosphatase 64 40 - 129 U/L    AST 26 10 - 50 U/L    ALT 39 10 - 50 U/L    Protein Total 6.9 6.4 - 8.3 g/dL    Albumin 4.0 3.5 - " 5.2 g/dL    Bilirubin Total 0.5 <=1.2 mg/dL    GFR Estimate 73 >60 mL/min/1.73m2   Lipase     Status: Normal    Collection Time: 10/23/22 11:11 PM   Result Value Ref [facility]    Lipase 20 13 - 60 U/L   TSH with free T4 reflex     Status: Abnormal    Collection Time: 10/23/22 11:11 PM   Result Value Ref [facility]    TSH 4.41 (H) 0.30 - 4.20 uIU/mL   Ethyl Alcohol Level     Status: Abnormal    Collection Time: 10/23/22 11:11 PM   Result Value Ref [facility]    Alcohol ethyl 0.02 (H) <=0.00 g/dL   Salicylate level     Status: Normal    Collection Time: 10/23/22 11:11 PM   Result Value Ref [facility]    Salicylate <0.5   mg/dL   Acetaminophen level     Status: Abnormal    Collection Time: 10/23/22 11:11 PM   Result Value Ref [facility]    Acetaminophen <5.0 (L) 10.0 - 30.0 ug/mL   CBC with platelets and differential     Status: None    Collection Time: 10/23/22 11:11 PM   Result Value Ref [facility]    WBC Count 8.6 4.0 - 11.0 10e3/uL    RBC Count 4.64 4.40 - 5.90 10e6/uL    Hemoglobin 14.0 13.3 - 17.7 g/dL    Hematocrit 40.7 40.0 - 53.0 %    MCV 88 78 - 100 fL    MCH 30.2 26.5 - 33.0 pg    MCHC 34.4 31.5 - 36.5 g/dL    RDW 12.5 10.0 - 15.0 %    Platelet Count 216 150 - 450 10e3/uL    % Neutrophils 56 %    % Lymphocytes 31 %    % Monocytes 9 %    % Eosinophils 3 %    % Basophils 1 %    % Immature Granulocytes 0 %    NRBCs per 100 WBC 0 <1 /100    Absolute Neutrophils 4.9 1.6 - 8.3 10e3/uL    Absolute Lymphocytes 2.7 0.8 - 5.3 10e3/uL    Absolute Monocytes 0.8 0.0 - 1.3 10e3/uL    Absolute Eosinophils 0.2 0.0 - 0.7 10e3/uL    Absolute Basophils 0.1 0.0 - 0.2 10e3/uL    Absolute Immature Granulocytes 0.0 <=0.4 10e3/uL    Absolute NRBCs 0.0 10e3/uL   T4 free     Status: Normal    Collection Time: 10/23/22 11:11 PM   Result Value Ref [facility]    Free T4 1.03 0.90 - 1.70 ng/dL   Asymptomatic COVID-19 Virus (Coronavirus) by PCR Nose     Status: Normal    Collection Time: 10/23/22 11:12 PM    Specimen: Nose; Swab   Result Value Ref [facility]    SARS CoV2 PCR  Negative Negative    Narrative    Testing was performed using the awilda  SARS-CoV-2 & Influenza A/B Assay on the awilda  Jadyn  System.  This test should be ordered for the detection of SARS-COV-2 in individuals who meet SARS-CoV-2 clinical and/or epidemiological criteria. Test performance is unknown in asymptomatic patients.  This test is for in vitro diagnostic use under the FDA EUA for laboratories certified under CLIA to perform moderate and/or high complexity testing. This test has not been FDA cleared or approved.  A negative test does not rule out the presence of PCR inhibitors in the specimen or target RNA in concentration below the limit of detection for the assay. The possibility of a false negative should be considered if the patient's recent exposure or clinical presentation suggests COVID-19.  [facility] are certified under the Clinical Laboratory Improvement Amendments of 1988 (CLIA-88) as qualified to perform moderate and/or high complexity laboratory testing.   [place] Draw     Status: None    Collection Time: 10/23/22 11:12 PM    Narrative    The following orders were created for panel order [place] Draw.  Procedure                               Abnormality         Status                     ---------                               -----------         ------                     Extra Blue Top Tube[[id number]]                              Final result                 Please view results for these tests on the individual orders.   Extra Blue Top Tube     Status: None    Collection Time: 10/23/22 11:12 PM   Result Value Ref [facility]    Hold Specimen [facility]    Urine Drugs of Abuse Screen     Status: Normal    Collection Time: 10/24/22  3:35 AM    Narrative    The following orders were created for panel order Urine Drugs of Abuse Screen.  Procedure                               Abnormality         Status                     ---------                               -----------         ------                      Drug abuse screen 77 uri...[[id number]]  Normal              Final result                 Please view results for these tests on the individual orders.   Drug abuse screen 77 urine (FL, RH, SH)     Status: Normal    Collection Time: 10/24/22  3:35 AM   Result Value Ref [facility]    Amphetamines Urine Screen Negative Screen Negative    Barbituates Urine Screen Negative Screen Negative    Benzodiazepine Urine Screen Negative Screen Negative    Cannabinoids Urine Screen Negative Screen Negative    Opiates Urine Screen Negative Screen Negative    PCP Urine Screen Negative Screen Negative    Cocaine Urine Screen Negative Screen Negative       ED Medications:  Medications   hydrOXYzine (ATARAX) tablet 50 mg (has no administration in time range)   multivitamin, therapeutic (THERA-VIT) tablet 1 tablet (1 tablet Oral Given 10/23/22 2317)   folic acid (FOLVITE) tablet 1 mg (1 mg Oral Given 10/23/22 2318)   thiamine (B-1) tablet 100 mg (100 mg Oral Given 10/23/22 2318)   magnesium oxide (MAG-OX) tablet 800 mg (800 mg Oral Given 10/23/22 2318)         IMPRESSION:    ICD-10-CM    1. Suicide attempt (H)  T14.91XA              Michael Munoz MD  10/26/22 6873

## 2022-10-24 NOTE — ED TRIAGE NOTES
"Patient arrives with son who reports patient is heavily intoxicated. Patient admits to drinking whiskey tonight but does not know how much. Patients son states that patients dad sent some text messages tonight. Patient's son texted him and said to call him in the morning and patient responded with \"if I wake up\". Patient also texted ex wife and stated that his boys would need him more than ever now.      Triage Assessment     Row Name 10/23/22 3701       Triage Assessment (Adult)    Airway WDL WDL       Respiratory WDL    Respiratory WDL WDL       Skin Circulation/Temperature WDL    Skin Circulation/Temperature WDL WDL       Cardiac WDL    Cardiac WDL WDL       Peripheral/Neurovascular WDL    Peripheral Neurovascular WDL WDL       Cognitive/Neuro/Behavioral WDL    Cognitive/Neuro/Behavioral WDL WDL              "

## 2022-10-24 NOTE — ED PROVIDER NOTES
"[facility] ED Mental Health Handoff Note:       Brief HPI:  This is a 57 year old male signed out to me by Dr. Whiting.  See initial ED Provider note for full details of the presentation.   Interval history is pertinent for   57 year old male with history of Peguero, prediabetes, GERD, who had texted stating that he was going to drink too much alluding to the fact that he wanted to drink himself to death.  Has had ongoing legal issues, causing increased amounts of depression.  Told previous provider that he drank whiskey along with taking several pills in an attempt to kill himself..    Home meds reviewed and ordered/administered: Yes    Medically stable for inpatient mental health admission: Yes.    Evaluated by mental health: No. Patient is clinically sober and awaiting evaluation for disposition.    Safety concerns: At the time I received sign out, there were no safety concerns.    Hold Status:  Active Orders   N/A            Exam:   Patient Vitals for the past 24 hrs:   BP Temp Temp src Pulse Resp SpO2 Height Weight   10/24/22 0354 (!) 129/96 98.3  F (36.8  C) Oral 96 -- -- -- --   10/24/22 0301 121/83 -- -- 65 11 95 % -- --   10/24/22 0246 -- -- -- 70 14 95 % -- --   10/24/22 0231 123/75 -- -- 63 13 95 % -- --   10/24/22 0216 -- -- -- 63 12 95 % -- --   10/24/22 0201 120/81 -- -- 64 12 94 % -- --   10/24/22 0146 -- -- -- 63 12 95 % -- --   10/24/22 0131 118/79 -- -- 63 13 94 % -- --   10/24/22 0101 121/89 -- -- 66 8 96 % -- --   10/24/22 0046 -- -- -- 67 20 95 % -- --   10/24/22 0031 111/79 -- -- 57 (!) 0 96 % -- --   10/24/22 0000 103/77 -- -- 66 12 96 % -- --   10/23/22 2330 111/78 -- -- 64 14 94 % -- --   10/23/22 2300 112/79 -- -- 68 12 97 % -- --   10/23/22 2250 -- -- -- 70 8 95 % -- --   10/23/22 2240 -- -- -- 71 16 94 % -- --   10/23/22 2232 113/80 98.1  F (36.7  C) Oral 69 -- 97 % -- --   10/23/22 2230 106/77 -- -- 74 -- 97 % -- --   10/23/22 2210 123/83 -- Oral 81 16 97 % 1.803 m (5' 11\") 95.3 kg " (210 lb)           ED Course:    Medications   multivitamin, therapeutic (THERA-VIT) tablet 1 tablet (1 tablet Oral Given 10/23/22 2317)   folic acid (FOLVITE) tablet 1 mg (1 mg Oral Given 10/23/22 2318)   thiamine (B-1) tablet 100 mg (100 mg Oral Given 10/23/22 2318)   magnesium oxide (MAG-OX) tablet 800 mg (800 mg Oral Given 10/23/22 2318)            There were no significant events during my shift.    Patient was signed out to the oncoming provider, Dr. Espinoza      Impression:    ICD-10-CM    1. Suicide attempt (H)  T14.91XA           Plan:    1. Awaiting mental health evaluation/recommendations.      RESULTS:   Results for orders placed or performed during the hospital encounter of 10/23/22 (from the past 24 hour(s))   CBC with platelets differential     Status: None    Collection Time: 10/23/22 11:11 PM    Narrative    The following orders were created for panel order CBC with platelets differential.  Procedure                               Abnormality         Status                     ---------                               -----------         ------                     CBC with platelets and d...[[id number]]                      Final result                 Please view results for these tests on the individual orders.   Comprehensive metabolic panel     Status: Abnormal    Collection Time: 10/23/22 11:11 PM   Result Value Ref [facility]    Sodium 138 136 - 145 mmol/L    Potassium 3.6 3.4 - 5.3 mmol/L    Chloride 102 98 - 107 mmol/L    Carbon Dioxide (CO2) 25 22 - 29 mmol/L    Anion Gap 11 7 - 15 mmol/L    Urea Nitrogen 12.7 6.0 - 20.0 mg/dL    Creatinine 1.16 0.67 - 1.17 mg/dL    Calcium 8.9 8.6 - 10.0 mg/dL    Glucose 103 (H) 70 - 99 mg/dL    Alkaline Phosphatase 64 40 - 129 U/L    AST 26 10 - 50 U/L    ALT 39 10 - 50 U/L    Protein Total 6.9 6.4 - 8.3 g/dL    Albumin 4.0 3.5 - 5.2 g/dL    Bilirubin Total 0.5 <=1.2 mg/dL    GFR Estimate 73 >60 mL/min/1.73m2   Lipase     Status: Normal    Collection Time:  10/23/22 11:11 PM   Result Value Ref [facility]    Lipase 20 13 - 60 U/L   TSH with free T4 reflex     Status: Abnormal    Collection Time: 10/23/22 11:11 PM   Result Value Ref [facility]    TSH 4.41 (H) 0.30 - 4.20 uIU/mL   Ethyl Alcohol Level     Status: Abnormal    Collection Time: 10/23/22 11:11 PM   Result Value Ref [facility]    Alcohol ethyl 0.02 (H) <=0.00 g/dL   Salicylate level     Status: Normal    Collection Time: 10/23/22 11:11 PM   Result Value Ref [facility]    Salicylate <0.5   mg/dL   Acetaminophen level     Status: Abnormal    Collection Time: 10/23/22 11:11 PM   Result Value Ref [facility]    Acetaminophen <5.0 (L) 10.0 - 30.0 ug/mL   CBC with platelets and differential     Status: None    Collection Time: 10/23/22 11:11 PM   Result Value Ref [facility]    WBC Count 8.6 4.0 - 11.0 10e3/uL    RBC Count 4.64 4.40 - 5.90 10e6/uL    Hemoglobin 14.0 13.3 - 17.7 g/dL    Hematocrit 40.7 40.0 - 53.0 %    MCV 88 78 - 100 fL    MCH 30.2 26.5 - 33.0 pg    MCHC 34.4 31.5 - 36.5 g/dL    RDW 12.5 10.0 - 15.0 %    Platelet Count 216 150 - 450 10e3/uL    % Neutrophils 56 %    % Lymphocytes 31 %    % Monocytes 9 %    % Eosinophils 3 %    % Basophils 1 %    % Immature Granulocytes 0 %    NRBCs per 100 WBC 0 <1 /100    Absolute Neutrophils 4.9 1.6 - 8.3 10e3/uL    Absolute Lymphocytes 2.7 0.8 - 5.3 10e3/uL    Absolute Monocytes 0.8 0.0 - 1.3 10e3/uL    Absolute Eosinophils 0.2 0.0 - 0.7 10e3/uL    Absolute Basophils 0.1 0.0 - 0.2 10e3/uL    Absolute Immature Granulocytes 0.0 <=0.4 10e3/uL    Absolute NRBCs 0.0 10e3/uL   T4 free     Status: Normal    Collection Time: 10/23/22 11:11 PM   Result Value Ref [facility]    Free T4 1.03 0.90 - 1.70 ng/dL   Asymptomatic COVID-19 Virus (Coronavirus) by PCR Nose     Status: Normal    Collection Time: 10/23/22 11:12 PM    Specimen: Nose; Swab   Result Value Ref [facility]    SARS CoV2 PCR Negative Negative    Narrative    Testing was performed using the awilda  SARS-CoV-2 & Influenza A/B Assay on the awilda  Jad[employer]   System.  This test should be ordered for the detection of SARS-COV-2 in individuals who meet SARS-CoV-2 clinical and/or epidemiological criteria. Test performance is unknown in asymptomatic patients.  This test is for in vitro diagnostic use under the FDA EUA for laboratories certified under CLIA to perform moderate and/or high complexity testing. This test has not been FDA cleared or approved.  A negative test does not rule out the presence of PCR inhibitors in the specimen or target RNA in concentration below the limit of detection for the assay. The possibility of a false negative should be considered if the patient's recent exposure or clinical presentation suggests COVID-19.  [facility] [employer] are certified under the Clinical Laboratory Improvement Amendments of 1988 (CLIA-88) as qualified to perform moderate and/or high complexity laboratory testing.   [place] Draw     Status: None    Collection Time: 10/23/22 11:12 PM    Narrative    The following orders were created for panel order [place] Draw.  Procedure                               Abnormality         Status                     ---------                               -----------         ------                     Extra Blue Top Tube[[id number]]                              Final result                 Please view results for these tests on the individual orders.   Extra Blue Top Tube     Status: None    Collection Time: 10/23/22 11:12 PM   Result Value Ref [facility]    Hold Specimen [facility]    Urine Drugs of Abuse Screen     Status: None (In process)    Collection Time: 10/24/22  3:35 AM    Narrative    The following orders were created for panel order Urine Drugs of Abuse Screen.  Procedure                               Abnormality         Status                     ---------                               -----------         ------                     Drug abuse screen 77 uri...[[id number]]                      In process                   Please  view results for these tests on the individual orders.             MD Kishore Perez, Reuben Brody MD  10/24/22 2239

## 2022-10-24 NOTE — PROGRESS NOTES
Daniel Schultz  October 24, 2022  Plan of Care Hand-off Note     Patient Care Path: Inpatient Mental Health    Plan for Care:   Patient with MDD, JOSE ALFREDO, and PTSD attempted suicide by overdose of pain meds with alcohol. He sent good bye messages to several people. He was brought to the ED by his son. On interview, patient endorses suicidal intent. He would benefit from inpatient admission for safety and stabilization. Dr. Addison Espinoza MD, agrees with this plan.     Writer called [facility] and patient was added to the waiting list for placement. He agrees to admission, but is holdable due to having attempted suicide by overdosing.  Patient has not addressed PTSD in therapy and reports recurring nightmares as well as flashbacks. He is retired  who is now on supervised probation with a stayed incarceration of one year, a large $13,000+ fine, and community service. Patient's sons are holding his guns.     Critical Safety Issues: High suicide risk.     Overview:  This patient is a child/adolescent: No    This patient has additional special visitor precautions: No    Legal Status: Voluntary, but holdable due to suicide attempt tonight.     Contacts:   Ever Vinson, [phone]., .: Contact Roman Vinson, [phone].    Psychiatry Consult:  Adult Psychiatry Consult requested related to consider prazosin or other appropriate medication to aid in PTSD flashbacks and nightmares treatment. . Psychiatry IP Consult Order Placed: No .    Updated Attending Provider regarding plan of care.    Leslie Fry, EDENILSON

## 2022-10-24 NOTE — TELEPHONE ENCOUNTER
S: Leslie gave clinical saying pt presented at  [facility] ED last pao, calling at 6:50 am.  57 year old/Male presenting with SA by od'ing on synthetic pain meds and etoh.   B: Pt arrives via his son. Fred was .02. Recent divorce. Still dealing with legal issues due to being convicted of stalking w/ intent to injure and miscondiuct of a  in 2019. He sent goodbye notes to his children. He sent his ex-wife a text saying he was sorry for things he had done in the past and saying their kids will need her now more than ever.    Pt affect in ED: flat affect  Pt Dx: MDD, PTSD, JOSE ALFREDO  Previous IP hx? No  Pt endorses SI. Pt denies SIB. Pt denies HI.   Hx of suicide attempt? No  Hx of aggression, or current concerns for aggression this visit? No  Pt is prescribed medication. Pt says he does not take his Zoloft consistently  Pt endorses OP services: med provider; no therapist  CD concerns: denies  Acute medical concerns: denies  Does Pt present with any of the following: assistive devices, insulin pump, J/G tube, catheter, CPAP, continuous IV, continuous O2, bariatric needs, ADA needs? No  Pt is ambulatory  Pt is  able to perform ADLs independently      A: Pt meets criteria for review for [facility] admission. Patient is voluntary. Preferred placement: [facility]  COVID: Negative  Utox: Negative  CMP: WNL  CBC: WNL   TSH: 4.41  HCG: N/A    R: Patient cleared and ready for behavioral bed placement: Yes  Pt placed on [facility] worklist, Intake searching for appropriate bed placement for patient review.       per bed search at 7:58 am: ([facility] or [facility])    [facility] is posting 0 beds.     [facility] is posting 0 beds.    [facility] is posting 0 beds.    [facility] is posting 0 beds.    [facility] is posting 0 beds.    [facility] is posting 0 beds.    [facility]: at cap per call to JHON Reyes at 7:26 am    Pt to wait in er until a bed is avail.

## 2022-10-24 NOTE — ED PROVIDER NOTES
History     Chief Complaint   Patient presents with     Alcohol Intoxication     Mental Health Problem     HPI  Daniel Schultz is a 57 year old male who presents with hyperlipidemia WILKINSON, prediabetes gastroesophageal reflux and excessive whiskey tonight.  Son expressed concern that his father texted and about drinking too much and alluding to that he may be dead in the morning.    He tells me that was his intent and he was considering suicide he took both excessive alcohol at least 6 ounces of whiskey along with several pills that he believes was oxycodone and bottle mixed with a solution.  He is not sure what was in there was an older bottle.  He notes that he has been considering on and off with suicidality and has had significant stressors related to legal troubles that have been ongoing for 4 to 6 years.  He has prior depression.  He is never had chemical dependency issues in the past.  He likely uses excessive whiskey     Allergies:  Allergies   Allergen Reactions     Nkda [No Known Drug Allergies]        Problem List:    Patient Active Problem List    Diagnosis Date Noted     JOSE ALFREDO (generalized anxiety disorder) 02/26/2019     Priority: Medium     Moderate single current episode of major depressive disorder (H) 01/08/2019     Priority: Medium     JAZMIN (obstructive sleep apnea) 08/18/2015     Priority: Medium     Severe JAZMIN   - HST on 8/17/2015 with weight 240 lbs, AHI 34, chris desat 86%, strong positional component.  Events labeled as central suspected to be post-arousal and feel it is most consistent with JAZMIN  He does not wear CPAP - 1/8/2019         Pre-diabetes 07/07/2015     Priority: Medium     Nonalcoholic steatohepatitis (WILKINSON) 05/26/2015     Priority: Medium     Fatty liver seen on US.  Had mild transaminitis       Meniscus tear 09/25/2013     Priority: Medium     Hyperlipidemia LDL goal <160 12/21/2010     Priority: Medium        Past Medical History:    Past Medical History:   Diagnosis Date      Moderate single current episode of major depressive disorder (H)        Past Surgical History:    Past Surgical History:   Procedure Laterality Date     ARTHROSCOPY ANKLE, OPEN REPAIR LIGAMENT, COMBINED Right 1/5/2017    Procedure: COMBINED ARTHROSCOPY ANKLE, OPEN REPAIR LIGAMENT;  Surgeon: Avinash Dick DPM;  Location: [facility]     ARTHROSCOPY KNEE IRRIGATION AND DEBRIDEMENT  9/27/2013    Procedure: ARTHROSCOPY KNEE IRRIGATION AND DEBRIDEMENT;;  Surgeon: Ley, Jeffrey Duane, MD;  Location: [facility]     ARTHROSCOPY KNEE WITH MEDIAL MENISCECTOMY  9/27/2013    Procedure: ARTHROSCOPY KNEE WITH MEDIAL MENISCECTOMY;  Right Knee Arthroscopy Partial Menisectomy and Debridement,Left Knee Arthroscopy Debridement and Loose Body Removal--needs assist.;  Surgeon: Ley, Jeffrey Duane, MD;  Location: [facility]     COLONOSCOPY N/A 8/17/2016    Procedure: COLONOSCOPY;  Surgeon: Rudolph Nazario MD;  Location: [facility]     HEMORRHOIDECTOMY  2000    Internal and external hemorrhoidectomy     REPAIR TENDON ANKLE Right 1/25/2018    Procedure: REPAIR TENDON ANKLE;  Right Ankle Posterior Tibial Tendon Repair,Peroneus Brevis Repair, Medial Ligament Repair;  Surgeon: Avinash Dick DPM;  Location: [facility]     REPAIR TENDON FOOT Right 4/26/2018    Procedure: REPAIR TENDON FOOT;  Right insertional posterior tibial tendon repair and tendon transfer.;  Surgeon: Avinash Dick DPM;  Location: [facility]     SURGICAL HISTORY OF -   6/2001    Left Achilles tendon repair       Family History:    Family History   Problem Relation Age of Onset     Aneurysm Mother      Depression Father        Social History:  Marital Status:   [2]  Social History     Tobacco Use     Smoking status: Never     Smokeless tobacco: Never   Vaping Use     Vaping Use: Never used   Substance Use Topics     Alcohol use: Yes     Alcohol/week: 0.0 standard drinks     Comment: Occasional     Drug use: No        Medications:    sertraline (ZOLOFT) 50 MG  "tablet          Review of Systems   Constitutional: Negative for chills, diaphoresis and fever.   HENT: Negative for ear pain, sinus pressure and sore throat.    Eyes: Negative for visual disturbance.   Respiratory: Negative for cough, shortness of breath and wheezing.    Cardiovascular: Negative for chest pain and palpitations.   Gastrointestinal: Negative for abdominal pain, blood in stool, constipation, diarrhea, nausea and vomiting.   Genitourinary: Negative for dysuria, frequency and urgency.   Skin: Negative for rash.   Neurological: Negative for headaches.   Psychiatric/Behavioral: Positive for dysphoric mood and suicidal ideas.   All other systems reviewed and are negative.      Physical Exam   BP: 123/83  Pulse: 81  Temp: 98.1  F (36.7  C)  Resp: 16  Height: 180.3 cm (5' 11\")  Weight: 95.3 kg (210 lb)  SpO2: 97 %      Physical Exam  Constitutional:       General: He is in acute distress.      Appearance: He is not diaphoretic.   HENT:      Head: Atraumatic.   Eyes:      Conjunctiva/sclera: Conjunctivae normal.   Cardiovascular:      Rate and Rhythm: Normal rate and regular rhythm.      Heart sounds: No murmur heard.  Pulmonary:      Effort: Pulmonary effort is normal. No respiratory distress.      Breath sounds: Normal breath sounds. No wheezing or rhonchi.   Abdominal:      General: There is no distension.      Palpations: There is no mass.      Tenderness: There is no abdominal tenderness. There is no guarding.   Musculoskeletal:      Cervical back: Neck supple.      Right lower leg: No edema.      Left lower leg: No edema.   Skin:     Coloration: Skin is not pale.      Findings: No rash.   Neurological:      General: No focal deficit present.      Mental Status: He is alert and oriented to person, place, and time.      Cranial Nerves: No cranial nerve deficit.      Sensory: No sensory deficit.      Motor: No weakness.   Psychiatric:         Attention and Perception: He does not perceive auditory or " visual hallucinations.         Mood and Affect: Mood is depressed.         Speech: He is communicative. Speech is not delayed or tangential.         Behavior: Behavior normal. Behavior is not agitated, slowed, aggressive or hyperactive. Behavior is cooperative.         Thought Content: Thought content includes suicidal ideation. Thought content does not include homicidal ideation. Thought content includes suicidal plan. Thought content does not include homicidal plan.         Cognition and Memory: Cognition is not impaired.       Patient is intoxicated but is clinically sober.  He answered all questions appropriately.    ED Course                Procedures                EKG Interpretation:      Interpreted by Jona Whiting MD  EKG done at 102 hours demonstrates a sinus rhythm 61 bpm left axis.  There is no ST change.  No T wave changes.  Normal R progression no Q waves.  Normal intervals.  Normal conduction.  No ectopy.  Impression sinus rhythm 61 bpm with no significant acute changes.      Critical Care time:  none               Results for orders placed or performed during the hospital encounter of 10/23/22 (from the past 24 hour(s))   CBC with platelets differential    Narrative    The following orders were created for panel order CBC with platelets differential.  Procedure                               Abnormality         Status                     ---------                               -----------         ------                     CBC with platelets and d...[[id number]]                      Final result                 Please view results for these tests on the individual orders.   Comprehensive metabolic panel   Result Value Ref Range    Sodium 138 136 - 145 mmol/L    Potassium 3.6 3.4 - 5.3 mmol/L    Chloride 102 98 - 107 mmol/L    Carbon Dioxide (CO2) 25 22 - 29 mmol/L    Anion Gap 11 7 - 15 mmol/L    Urea Nitrogen 12.7 6.0 - 20.0 mg/dL    Creatinine 1.16 0.67 - 1.17 mg/dL    Calcium 8.9 8.6 - 10.0 mg/dL     Glucose 103 (H) 70 - 99 mg/dL    Alkaline Phosphatase 64 40 - 129 U/L    AST 26 10 - 50 U/L    ALT 39 10 - 50 U/L    Protein Total 6.9 6.4 - 8.3 g/dL    Albumin 4.0 3.5 - 5.2 g/dL    Bilirubin Total 0.5 <=1.2 mg/dL    GFR Estimate 73 >60 mL/min/1.73m2   Lipase   Result Value Ref Range    Lipase 20 13 - 60 U/L   TSH with free T4 reflex   Result Value Ref Range    TSH 4.41 (H) 0.30 - 4.20 uIU/mL   Ethyl Alcohol Level   Result Value Ref Range    Alcohol ethyl 0.02 (H) <=0.00 g/dL   Salicylate level   Result Value Ref Range    Salicylate <0.5   mg/dL   Acetaminophen level   Result Value Ref Range    Acetaminophen <5.0 (L) 10.0 - 30.0 ug/mL   CBC with platelets and differential   Result Value Ref Range    WBC Count 8.6 4.0 - 11.0 10e3/uL    RBC Count 4.64 4.40 - 5.90 10e6/uL    Hemoglobin 14.0 13.3 - 17.7 g/dL    Hematocrit 40.7 40.0 - 53.0 %    MCV 88 78 - 100 fL    MCH 30.2 26.5 - 33.0 pg    MCHC 34.4 31.5 - 36.5 g/dL    RDW 12.5 10.0 - 15.0 %    Platelet Count 216 150 - 450 10e3/uL    % Neutrophils 56 %    % Lymphocytes 31 %    % Monocytes 9 %    % Eosinophils 3 %    % Basophils 1 %    % Immature Granulocytes 0 %    NRBCs per 100 WBC 0 <1 /100    Absolute Neutrophils 4.9 1.6 - 8.3 10e3/uL    Absolute Lymphocytes 2.7 0.8 - 5.3 10e3/uL    Absolute Monocytes 0.8 0.0 - 1.3 10e3/uL    Absolute Eosinophils 0.2 0.0 - 0.7 10e3/uL    Absolute Basophils 0.1 0.0 - 0.2 10e3/uL    Absolute Immature Granulocytes 0.0 <=0.4 10e3/uL    Absolute NRBCs 0.0 10e3/uL   T4 free   Result Value Ref Range    Free T4 1.03 0.90 - 1.70 ng/dL   Asymptomatic COVID-19 Virus (Coronavirus) by PCR Nose    Specimen: Nose; Swab   Result Value Ref Range    SARS CoV2 PCR Negative Negative    Narrative    Testing was performed using the [employer]ilda  SARS-CoV-2 & Influenza A/B Assay on the awilda  Jadyn  System.  This test should be ordered for the detection of SARS-COV-2 in individuals who meet SARS-CoV-2 clinical and/or epidemiological criteria. Test  performance is unknown in asymptomatic patients.  This test is for in vitro diagnostic use under the FDA EUA for laboratories certified under CLIA to perform moderate and/or high complexity testing. This test has not been FDA cleared or approved.  A negative test does not rule out the presence of PCR inhibitors in the specimen or target RNA in concentration below the limit of detection for the assay. The possibility of a false negative should be considered if the patient's recent exposure or clinical presentation suggests COVID-19.  [facility] are certified under the Clinical Laboratory Improvement Amendments of 1988 (CLIA-88) as qualified to perform moderate and/or high complexity laboratory testing.   [place] Draw    Narrative    The following orders were created for panel order [place] Draw.  Procedure                               Abnormality         Status                     ---------                               -----------         ------                     Extra Blue Top Tube[[id number]]                              Final result                 Please view results for these tests on the individual orders.   Extra Blue Top Tube   Result Value Ref Range    Hold Specimen JI        Medications - No data to display    Assessments & Plan (with Medical Decision Making)     MDM: Daniel Schultz is a 57 year old male who presents with intoxication and excessive alcohol use -but no obvious consistent heavy use apparently per the patient and has not had chemical dependency in the past.  His intent today with 6 ounces of whiskey plus something that was in a bottle that could have been oxycodone he tells this is a liquid but unclear if that was Percocet with Tylenol and unclear what else was in the bottle that he drank.  Evaluate with toxicology testing and likely will require medical clearance and psych admission given suicide attempt.  We will have DEC evaluate    The laboratory testing is  reassuring.  Patient is pending DEC eval.  Signed out to Dr. Novak while awaiting this.  Do believe the patient likely will require stabilization stay due to the ingestions that occurred today with the intent to kill himself.    I have reviewed the nursing notes.    I have reviewed the findings, diagnosis, plan and need for follow up with the patient.       New Prescriptions    No medications on file       Final diagnoses:   Suicide attempt (H)       10/23/2022   [facility] EMERGENCY DEPT     Jona Whiting MD  10/24/22 0305

## 2022-10-24 NOTE — ED NOTES
"Triage & Transition Services, Extended Care     Therapy Progress Note    Patient: Addison goes by \"Addison,\" uses he/him pronouns  Date of Service: October 24, 2022  Site of Service: [facility]  Patient was seen virtually (Ullink cart or other teleconferencing device).     Presenting problem:   Addison is followed related to Placement delay: waiting bed availability. Please see initial DEC/[facility] Crisis Assessment completed by Leslie Fry LP   on 10.24.2022 for complete assessment information. Notable concerns include worsening depressive sx, SA via intentional ingestion OD.     Individuals Present: Addison & LUCERO CARDENAS, [facility]    Session start: 1601  Session end: 1613  Session duration in minutes: 12  Session number: 1  Anticipated number of sessions or this episode of care: TBD pending bed availability  CPT utilized: 96163 - Psychotherapy (with patient) - 30 (16-37*) min    Current Presentation:   Pt presents to session continuing to endorse overall depressive sx consistent with those from the initial evaluation, stating \"last night was a cry for help, and I need to follow through with getting that help.\" Does continue to endorse fears for risk of harm should he be discharged, and since admission has been making efforts to utilize natural supports, whom are also encouraging pt to seek needed assistance.     Mental Status Exam:   Appearance: awake, alert, adequately groomed, dressed in hospital scrubs, appeared as age stated  Attitude: cooperative  Eye Contact: fair  Mood: anxious and depressed  Affect: appropriate and in normal range and mood congruent  Speech: clear, coherent  Psychomotor Behavior: no evidence of tardive dyskinesia, dystonia, or tics  Thought Process:  logical and goal oriented  Associations: no loose associations  Thought Content: active suicidal ideation present and plan for suicide present  Insight: good  Judgement: poor  Oriented to: time, person, and place  Attention Span and " Concentration: intact  Recent and Remote Memory: intact    Diagnosis:   Major depressive disorder, Recurrent episode, Moderate     F33.1                             Generalized anxiety disorder  F41.1                             Posttraumatic stress disorder F43.10    Therapeutic Intervention(s):   Provided active listening, unconditional positive regard, and validation. Explored motivation for behavioral change.    Treatment Objective(s) Addressed:   The focus of this session was on rapport building, identifying an appropriate aftercare plan and assessing safety.     Progress Towards Goals:   Patient reports stable symptoms. Patient is making progress towards treatment goals as evidenced by continued receptivity to tx.     Case Management:   Consulted with pt's attending ED physician, Dr. Munoz to provide updates on pt's signs/sx. Attending concurs with existing dispo in light of persistent sx.     General Recommendations:   Continue to monitor for harm. Consider: Consider 1:1 staffing, Use a positive, direct and calm approach. Pt's tend to match the energy/mood of the staff. Keep focus positive and upbeat, Provide the pt with options to provide a sense of control. Try to tell the pt what they can do instead of what they can't do, Allow family calls/visits and Listen in a neutral, non-judgmental way. Offer reassurance    Plan:   Pt is currently voluntary but holdable per initial evaluation and continued signs/sx. Plan to admit to IP continued.    Plan for Care reviewed with Assigned Medical Provider? Yes, Provider, Dr. Munoz, response: concurrence     LUCERO CARDENAS, [facility]   Licensed Mental Health Professional (LMHP), [employer]  [phone]

## 2022-10-24 NOTE — ED NOTES
"Pt compliant with cares, changed into gown and open to discuss what he took. States he took pills that he had left over from his knee surgeries, consumed around 2100. States they were \"all white pills.\" Does not know how many or what their names were. Placed on 1:1 sitter and placed on EtCO2 monitor. Also reports he had 3 \"standard size\" whiskey mixed drinks tonight, last drink at approx 2130.  "

## 2022-10-24 NOTE — ED NOTES
Please call lorri Curtis at [phone] for a ride or with updates. Call lorri Owens at [phone] second, if needed.

## 2022-10-24 NOTE — CONSULTS
Diagnostic Evaluation Consultation  Crisis Assessment    Patient was assessed: Louis  Patient location: [facility]   Was a release of information signed: Yes. Providers included on the release: No Fierro DO, [phone].       Referral Data and Chief Complaint  Daniel Schultz is a 57 year old, who uses he/him pronouns, and presents to the ED with family/friends. Patient is referred to the ED by family/friends. Patient is presenting to the ED for the following concerns: alcohol intoxication and suicidal ideation.      Informed Consent and Assessment Methods     Patient is his own decision maker.   Writer met with patient and explained the crisis assessment process, including applicable information disclosures and limits to confidentiality, assessed understanding of the process, and obtained consent to proceed with the assessment. Patient was observed to be able to participate in the assessment as evidenced by alert, eye contact, active engagement. Assessment methods included conducting a formal interview with patient, review of medical records, collaboration with medical staff, and obtaining relevant collateral information from family and community providers when available..     Over the course of this crisis assessment provided reassurance, offered validation, engaged patient in problem solving and disposition planning, worked with patient on safety and aftercare planning and provided psychoeducation. Patient's response to interventions was calm, polite, talkative, with active participation.      Summary of Patient Situation  Patient reports having six drinks of alcohol and ingesting pain pills as a suicide attempt. He sent text messages to several people which were goodbye messages alluding to his plan to end his life.  For example, he sent a text to his son saying how much he loves him and several to his ex wife apologizing for things he has done as well as telling her their children will need her  "more than ever now. Patient's son brought him to the ED for evaluation. Patient was medically cleared.  Blood alcohol level was 0.02 and urine drug screen was negative for all tested drugs of abuse. Patient explained his suicide attempt as \"There are three things you need in life; li, love, and hope. Tonight, I didn't have hope.\"  Patient, who is retired law enforcement, was convicted of Stalking with intent to harm and Misconduct of a  in 2021. Patient remains under court jurisdiction through at least 2024 with stay of imposition of one year incarceration, over $13,000 fees and fine, plus 20 hours per year of community service. 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  He is experiencing flashbacks as recurrent nightmares.  At this time, patient is at high risk of suicide.  He denies thoughts of harm to others. He denies hallucinations. Patient denies use of illicit drugs. He reports drinking socially.     Brief Psychosocial History  Patient was born and raised in [place]. His parents  when he was [age] years old. Patient describes his father as an alcoholic. He was raised by his mother, who moved around a lot. Patient stated that he attended a different school every year.  As a teen, he moved to [place] to live with his father, but that didn't work out, so he moved back to [place]. Patient graduated high school and completed a two year Law Enforcement degree. He later completed a Bachelor's degree in Business and a Master's degree in Administrative Justice. Patient began his career as a  in a small town and later transitioned to a large city. 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 Patient was  in 2021. He and his wife had three sons.  The older two are in law enforcement. The youngest son is in [employer].  " Patient lives alone. He enjoys golfing, collecting sports cards, watching movies, and sports activities. He identifies as Moravian and stated that he has very strong involvement in his li.  Patient identified his personal strengths as getting along with people quickly, being able to handle stress, dealing with problems well, compassionate, and goal oriented.     Significant Clinical History  Previous diagnoses include MDD, JOSE ALFREDO, and PTSD. Patient saw a therapist for several sessions at some time in the past.  He does not have a therapist currently. Patient is prescribed Zoloft 50 mg/day by his primary care provider.  He has no history of inpatient, PHP or IOP.  He has no history of [facility] treatment. Family history if alcohol dependence in patient's father.  There is no family history of suicide.      Collateral Information  [facility], Care Everywhere, and [facility] public court records were reviewed.    The following information was received from Ever whose relationship to the patient is son. Information was obtained via phone. Their phone number is [phone] and they last had contact with patient on admission to the ED tonight.    What happened today: Ever and others received text messages from patient which were good bye messages.     What is different about patient's functioning: He has been experiencing PTSD.     Concern about alcohol/drug use: No    What do you think the patient needs: Hospitalization.     Has patient made comments about wanting to kill themselves/others:  No    If d/c is recommended, can they take part in safety/aftercare planning: Yes patient's guns are locked up by Ever and his brother.     Other information: None.        Risk Assessment  ESS-6  1.a. Over the past 2 weeks, have you had thoughts of killing yourself? Yes  1.b. Have you ever attempted to kill yourself and, if yes, when did this last happen? Yes tonight.   2. Recent or current suicide plan? Yes overdose.    3. Recent or current intent  to act on ideation? Yes  4. Lifetime psychiatric hospitalization? No  5. Pattern of excessive substance use? No  6. Current irritability, agitation, or aggression? No  Scoring note: BOTH 1a and 1b must be yes for it to score 1 point, if both are not yes it is zero. All others are 1 point per number. If all questions 1a/1b - 6 are no, risk is negligible. If one of 1a/1b is yes, then risk is mild. If either question 2 or 3, but not both, is yes, then risk is automatically moderate regardless of total score. If both 2 and 3 are yes, risk is automatically high regardless of total score.      Score: 3, high risk      Does the patient have access to lethal means? Yes - describe patient has access to medications in the community.      Does the patient engage in non-suicidal self-injurious behavior (NSSI/SIB)? no     Does the patient have thoughts of harming others? No     Is the patient engaging in sexually inappropriate behavior?  no        Current Substance Abuse     Is there recent substance abuse? Patient reports drinking socially.  He denies illicit drug use.      Was a urine drug screen or blood alcohol level obtained: Yes BAL = 0.02 and urine drug screen was negative for all tested drugs of abuse.        Mental Status Exam     Affect: Flat   Appearance: Appropriate    Attention Span/Concentration: Attentive  Eye Contact: Engaged   Fund of Knowledge: Appropriate    Language /Speech Content: Fluent   Language /Speech Volume: Normal    Language /Speech Rate/Productions: Normal    Recent Memory: Intact   Remote Memory: Intact   Mood: Depressed    Orientation to Person: Yes    Orientation to Place: Yes   Orientation to Time of Day: Yes    Orientation to Date: Yes    Situation (Do they understand why they are here?): Yes    Psychomotor Behavior: Normal    Thought Content: Suicidal   Thought Form: Intact      History of commitment: No       Medication    Psychotropic medications: Zoloft 50 mg/day  Medication changes made  in the last two weeks: No       Current Care Team    Primary Care Provider: Dr. No Fierro DO, [facility]  Psychiatrist: No  Therapist: No  : No     CTSS or ARMHS: No  ACT Team: No  Other: No      Diagnosis    Major depressive disorder, Recurrent episode, Moderate F33.1      Generalized anxiety disorder F41.1      Posttraumatic stress disorder F43.10      Clinical Summary and Substantiation of Recommendations    Patient with MDD, JOSE ALFREDO, and PTSD attempted suicide by overdose of pain meds with alcohol. He sent good bye messages to several people. He was brought to the ED by his son. On interview, patient endorses suicidal intent. He would benefit from inpatient admission for safety and stabilization. Dr. Addison Espinoza MD, agrees with this plan.    Admission to Inpatient Level of Care is indicated due to:    1. Patient risk of severity of behavioral health disorder is appropriate to proposed level of care as indicated by:    Imminent Risk of Harm: Very Recent suicide attempt or deliberate act of serious harm to self WITHOUT relief of factors precipitating the attempt or act  And/or:  Behavioral health disorder is present and appropriate for inpatient care with both of the following:     Severe psychiatric, behavioral or other comorbid conditions are appropriate for management at inpatient mental health as indicated by at least one of the following:   o Depressive symptoms and Anxiety    Severe dysfunction in daily living is present as indicated by at least one of the following:   o Extreme deterioration in social interactions    2. Inpatient mental health services are necessary to meet patient needs and at least one of the following:  Specific condition related to admission diagnosis is present and judged likely to deteriorate in absence of treatment at proposed level of care    3. Situation and expectations are appropriate for inpatient care, as indicated by one of the  following:   Voluntary treatment at lower level of care is not feasible. Patient attempted suicide tonight and remains at very high risk of recurrence, despite him saying he is fine now.     Disposition    Recommended disposition: Inpatient Mental Health       Reviewed case and recommendations with attending provider. Attending Name: Dr. Addison Espinoza MD       Attending concurs with disposition: Yes       Patient concurs with disposition: Yes       Guardian concurs with disposition: NA      Final disposition: Inpatient mental health .     Inpatient Details (if applicable):   Is patient admitted voluntarily:Yes      Patient aware of potential for transfer if there is not appropriate placement? Yes       Patient is willing to travel outside of the [facility] for placement? Yes      Behavioral Intake Notified? Yes: Date: 10/24/2022 Time: 0705.       Assessment Details    Patient interview started at: 0545 and completed at: 0635.     Total duration spent on the patient case in minutes: 1.0 hrs      CPT code(s) utilized: 96937 - Psychotherapy for Crisis - 60 (30-74*) min       Leslie Fry, MS, LP, [facility]  DEC - Triage & Transition Services  Callback: [phone]

## 2022-10-24 NOTE — ED PROVIDER NOTES
[facility] ED Mental Health Handoff Note:       Brief HPI:  This is a 57 year old male signed out to me by Dr. Novak.  See initial ED Provider note for full details of the presentation. Interval history is pertinent for 57 year old male with history of Peguero, prediabetes, GERD, who had texted stating that he was going to drink too much alluding to the fact that he wanted to drink himself to death.  Has had ongoing legal issues, causing increased amounts of depression.  Told previous provider that he drank whiskey along with taking several pills in an attempt to kill himself...    Home meds reviewed and ordered/administered: Yes    Medically stable for inpatient mental health admission: Yes.    Evaluated by mental health: Yes. The recommendation is for inpatient mental health treatment. Bed search in process    Safety concerns: At the time I received sign out, there were no safety concerns.    Hold Status:  Active Orders   N/A            Exam:   Patient Vitals for the past 24 hrs:   BP Temp Temp src Pulse Resp SpO2 Height Weight   10/24/22 0354 (!) 129/96 98.3  F (36.8  C) Oral 96 -- -- -- --   10/24/22 0301 121/83 -- -- 65 11 95 % -- --   10/24/22 0246 -- -- -- 70 14 95 % -- --   10/24/22 0231 123/75 -- -- 63 13 95 % -- --   10/24/22 0216 -- -- -- 63 12 95 % -- --   10/24/22 0201 120/81 -- -- 64 12 94 % -- --   10/24/22 0146 -- -- -- 63 12 95 % -- --   10/24/22 0131 118/79 -- -- 63 13 94 % -- --   10/24/22 0101 121/89 -- -- 66 8 96 % -- --   10/24/22 0046 -- -- -- 67 20 95 % -- --   10/24/22 0031 111/79 -- -- 57 (!) 0 96 % -- --   10/24/22 0000 103/77 -- -- 66 12 96 % -- --   10/23/22 2330 111/78 -- -- 64 14 94 % -- --   10/23/22 2300 112/79 -- -- 68 12 97 % -- --   10/23/22 2250 -- -- -- 70 8 95 % -- --   10/23/22 2240 -- -- -- 71 16 94 % -- --   10/23/22 2232 113/80 98.1  F (36.7  C) Oral 69 -- 97 % -- --   10/23/22 2230 106/77 -- -- 74 -- 97 % -- --   10/23/22 2210 123/83 -- Oral 81 16 97 % 1.803 m (5'  "11\") 95.3 kg (210 lb)           ED Course:    Medications   hydrOXYzine (ATARAX) tablet 50 mg (has no administration in time range)   multivitamin, therapeutic (THERA-VIT) tablet 1 tablet (1 tablet Oral Given 10/23/22 2317)   folic acid (FOLVITE) tablet 1 mg (1 mg Oral Given 10/23/22 2318)   thiamine (B-1) tablet 100 mg (100 mg Oral Given 10/23/22 2318)   magnesium oxide (MAG-OX) tablet 800 mg (800 mg Oral Given 10/23/22 2318)            There were no significant events during my shift.    Patient was signed out to the oncoming provider, Dr. Silva      Impression:    ICD-10-CM    1. Suicide attempt (H)  T14.91XA           Plan:    1. Awaiting inpatient mental health admission/transfer.      RESULTS:   Results for orders placed or performed during the hospital encounter of 10/23/22 (from the past 24 hour(s))   CBC with platelets differential     Status: None    Collection Time: 10/23/22 11:11 PM    Narrative    The following orders were created for panel order CBC with platelets differential.  Procedure                               Abnormality         Status                     ---------                               -----------         ------                     CBC with platelets and d...[[id number]]                      Final result                 Please view results for these tests on the individual orders.   Comprehensive metabolic panel     Status: Abnormal    Collection Time: 10/23/22 11:11 PM   Result Value Ref Range    Sodium 138 136 - 145 mmol/L    Potassium 3.6 3.4 - 5.3 mmol/L    Chloride 102 98 - 107 mmol/L    Carbon Dioxide (CO2) 25 22 - 29 mmol/L    Anion Gap 11 7 - 15 mmol/L    Urea Nitrogen 12.7 6.0 - 20.0 mg/dL    Creatinine 1.16 0.67 - 1.17 mg/dL    Calcium 8.9 8.6 - 10.0 mg/dL    Glucose 103 (H) 70 - 99 mg/dL    Alkaline Phosphatase 64 40 - 129 U/L    AST 26 10 - 50 U/L    ALT 39 10 - 50 U/L    Protein Total 6.9 6.4 - 8.3 g/dL    Albumin 4.0 3.5 - 5.2 g/dL    Bilirubin Total 0.5 <=1.2 mg/dL    " GFR Estimate 73 >60 mL/min/1.73m2   Lipase     Status: Normal    Collection Time: 10/23/22 11:11 PM   Result Value Ref Range    Lipase 20 13 - 60 U/L   TSH with free T4 reflex     Status: Abnormal    Collection Time: 10/23/22 11:11 PM   Result Value Ref Range    TSH 4.41 (H) 0.30 - 4.20 uIU/mL   Ethyl Alcohol Level     Status: Abnormal    Collection Time: 10/23/22 11:11 PM   Result Value Ref Range    Alcohol ethyl 0.02 (H) <=0.00 g/dL   Salicylate level     Status: Normal    Collection Time: 10/23/22 11:11 PM   Result Value Ref Range    Salicylate <0.5   mg/dL   Acetaminophen level     Status: Abnormal    Collection Time: 10/23/22 11:11 PM   Result Value Ref Range    Acetaminophen <5.0 (L) 10.0 - 30.0 ug/mL   CBC with platelets and differential     Status: None    Collection Time: 10/23/22 11:11 PM   Result Value Ref Range    WBC Count 8.6 4.0 - 11.0 10e3/uL    RBC Count 4.64 4.40 - 5.90 10e6/uL    Hemoglobin 14.0 13.3 - 17.7 g/dL    Hematocrit 40.7 40.0 - 53.0 %    MCV 88 78 - 100 fL    MCH 30.2 26.5 - 33.0 pg    MCHC 34.4 31.5 - 36.5 g/dL    RDW 12.5 10.0 - 15.0 %    Platelet Count 216 150 - 450 10e3/uL    % Neutrophils 56 %    % Lymphocytes 31 %    % Monocytes 9 %    % Eosinophils 3 %    % Basophils 1 %    % Immature Granulocytes 0 %    NRBCs per 100 WBC 0 <1 /100    Absolute Neutrophils 4.9 1.6 - 8.3 10e3/uL    Absolute Lymphocytes 2.7 0.8 - 5.3 10e3/uL    Absolute Monocytes 0.8 0.0 - 1.3 10e3/uL    Absolute Eosinophils 0.2 0.0 - 0.7 10e3/uL    Absolute Basophils 0.1 0.0 - 0.2 10e3/uL    Absolute Immature Granulocytes 0.0 <=0.4 10e3/uL    Absolute NRBCs 0.0 10e3/uL   T4 free     Status: Normal    Collection Time: 10/23/22 11:11 PM   Result Value Ref Range    Free T4 1.03 0.90 - 1.70 ng/dL   Asymptomatic COVID-19 Virus (Coronavirus) by PCR Nose     Status: Normal    Collection Time: 10/23/22 11:12 PM    Specimen: Nose; Swab   Result Value Ref Range    SARS CoV2 PCR Negative Negative    Narrative    Testing was  performed using the [employer]  SARS-CoV-2 & Influenza A/B Assay on the [employer]  [employer]  System.  This test should be ordered for the detection of SARS-COV-2 in individuals who meet SARS-CoV-2 clinical and/or epidemiological criteria. Test performance is unknown in asymptomatic patients.  This test is for in vitro diagnostic use under the FDA EUA for laboratories certified under CLIA to perform moderate and/or high complexity testing. This test has not been FDA cleared or approved.  A negative test does not rule out the presence of PCR inhibitors in the specimen or target RNA in concentration below the limit of detection for the assay. The possibility of a false negative should be considered if the patient's recent exposure or clinical presentation suggests COVID-19.  [facility] [employer] are certified under the Clinical Laboratory Improvement Amendments of 1988 (CLIA-88) as qualified to perform moderate and/or high complexity laboratory testing.   [facility] Draw     Status: None    Collection Time: 10/23/22 11:12 PM    Narrative    The following orders were created for panel order [facility] Draw.  Procedure                               Abnormality         Status                     ---------                               -----------         ------                     Extra Blue Top Tube[[id number]]                              Final result                 Please view results for these tests on the individual orders.   Extra Blue Top Tube     Status: None    Collection Time: 10/23/22 11:12 PM   Result Value Ref Range    Hold Specimen [facility]    Urine Drugs of Abuse Screen     Status: Normal    Collection Time: 10/24/22  3:35 AM    Narrative    The following orders were created for panel order Urine Drugs of Abuse Screen.  Procedure                               Abnormality         Status                     ---------                               -----------         ------                     Drug abuse screen 77  uri...[[id number]]  Normal              Final result                 Please view results for these tests on the individual orders.   Drug abuse screen 77 urine (FL, RH, SH)     Status: Normal    Collection Time: 10/24/22  3:35 AM   Result Value Ref Range    Amphetamines Urine Screen Negative Screen Negative    Barbituates Urine Screen Negative Screen Negative    Benzodiazepine Urine Screen Negative Screen Negative    Cannabinoids Urine Screen Negative Screen Negative    Opiates Urine Screen Negative Screen Negative    PCP Urine Screen Negative Screen Negative    Cocaine Urine Screen Negative Screen Negative             MD Alexis Culp Richard J, MD  10/24/22 6970

## 2022-10-25 ENCOUNTER — HOSPITAL ENCOUNTER (INPATIENT)
Facility: HOSPITAL | Age: 57
LOS: 3 days | Discharge: HOME | DRG: 885 | End: 2022-10-28
Attending: STUDENT IN AN ORGANIZED HEALTH CARE EDUCATION/TRAINING PROGRAM | Admitting: STUDENT IN AN ORGANIZED HEALTH CARE EDUCATION/TRAINING PROGRAM
Payer: COMMERCIAL

## 2022-10-25 VITALS
SYSTOLIC BLOOD PRESSURE: 120 MMHG | BODY MASS INDEX: 29.4 KG/M2 | HEIGHT: 71 IN | OXYGEN SATURATION: 99 % | RESPIRATION RATE: 16 BRPM | TEMPERATURE: 98.6 F | DIASTOLIC BLOOD PRESSURE: 93 MMHG | HEART RATE: 82 BPM | WEIGHT: 210 LBS

## 2022-10-25 DIAGNOSIS — R73.03 PRE-DIABETES: ICD-10-CM

## 2022-10-25 DIAGNOSIS — E78.5 HYPERLIPIDEMIA LDL GOAL <160: ICD-10-CM

## 2022-10-25 DIAGNOSIS — R52 PAIN: ICD-10-CM

## 2022-10-25 DIAGNOSIS — F32.1 MODERATE SINGLE CURRENT EPISODE OF MAJOR DEPRESSIVE DISORDER (H): Primary | ICD-10-CM

## 2022-10-25 PROCEDURE — 99222 1ST HOSP IP/OBS MODERATE 55: CPT | Performed by: NURSE PRACTITIONER

## 2022-10-25 PROCEDURE — 124N000001 HC R&B MH

## 2022-10-25 PROCEDURE — 250N000013 HC RX MED GY IP 250 OP 250 PS 637: Performed by: NURSE PRACTITIONER

## 2022-10-25 RX ORDER — AMOXICILLIN 250 MG
1 CAPSULE ORAL 2 TIMES DAILY PRN
Status: DISCONTINUED | OUTPATIENT
Start: 2022-10-25 | End: 2022-10-28 | Stop reason: HOSPADM

## 2022-10-25 RX ORDER — IBUPROFEN 400 MG/1
400 TABLET, FILM COATED ORAL 2 TIMES DAILY PRN
Status: DISCONTINUED | OUTPATIENT
Start: 2022-10-25 | End: 2022-10-28 | Stop reason: HOSPADM

## 2022-10-25 RX ORDER — ACETAMINOPHEN 325 MG/1
650 TABLET ORAL EVERY 4 HOURS PRN
Status: DISCONTINUED | OUTPATIENT
Start: 2022-10-25 | End: 2022-10-28 | Stop reason: HOSPADM

## 2022-10-25 RX ORDER — OLANZAPINE 10 MG/1
10 TABLET ORAL 3 TIMES DAILY PRN
Status: DISCONTINUED | OUTPATIENT
Start: 2022-10-25 | End: 2022-10-28 | Stop reason: HOSPADM

## 2022-10-25 RX ORDER — HYDROXYZINE HYDROCHLORIDE 25 MG/1
25 TABLET, FILM COATED ORAL EVERY 4 HOURS PRN
Status: DISCONTINUED | OUTPATIENT
Start: 2022-10-25 | End: 2022-10-28 | Stop reason: HOSPADM

## 2022-10-25 RX ORDER — OLANZAPINE 10 MG/2ML
10 INJECTION, POWDER, FOR SOLUTION INTRAMUSCULAR 3 TIMES DAILY PRN
Status: DISCONTINUED | OUTPATIENT
Start: 2022-10-25 | End: 2022-10-28 | Stop reason: HOSPADM

## 2022-10-25 RX ORDER — ESCITALOPRAM OXALATE 10 MG/1
10 TABLET ORAL DAILY
Status: ON HOLD | COMMUNITY
End: 2022-10-27

## 2022-10-25 RX ORDER — MAGNESIUM HYDROXIDE/ALUMINUM HYDROXICE/SIMETHICONE 120; 1200; 1200 MG/30ML; MG/30ML; MG/30ML
30 SUSPENSION ORAL EVERY 4 HOURS PRN
Status: DISCONTINUED | OUTPATIENT
Start: 2022-10-25 | End: 2022-10-28 | Stop reason: HOSPADM

## 2022-10-25 RX ORDER — LIDOCAINE 4 G/G
2 PATCH TOPICAL
Status: DISCONTINUED | OUTPATIENT
Start: 2022-10-25 | End: 2022-10-28 | Stop reason: HOSPADM

## 2022-10-25 RX ORDER — LANOLIN ALCOHOL/MO/W.PET/CERES
3 CREAM (GRAM) TOPICAL
Status: DISCONTINUED | OUTPATIENT
Start: 2022-10-25 | End: 2022-10-26

## 2022-10-25 RX ORDER — IBUPROFEN 200 MG
400 TABLET ORAL 2 TIMES DAILY PRN
Status: ON HOLD | COMMUNITY
End: 2022-10-27

## 2022-10-25 RX ADMIN — METFORMIN HYDROCHLORIDE 500 MG: 500 TABLET, FILM COATED ORAL at 17:07

## 2022-10-25 RX ADMIN — Medication 2 PATCH: at 20:15

## 2022-10-25 ASSESSMENT — ACTIVITIES OF DAILY LIVING (ADL)
ADLS_ACUITY_SCORE: 35
WEAR_GLASSES_OR_BLIND: NO
ADLS_ACUITY_SCORE: 35
TOILETING_ISSUES: NO
DOING_ERRANDS_INDEPENDENTLY_DIFFICULTY: NO
CONCENTRATING,_REMEMBERING_OR_MAKING_DECISIONS_DIFFICULTY: NO
ADLS_ACUITY_SCORE: 28
ADLS_ACUITY_SCORE: 35
ADLS_ACUITY_SCORE: 35
WALKING_OR_CLIMBING_STAIRS_DIFFICULTY: NO
CHANGE_IN_FUNCTIONAL_STATUS_SINCE_ONSET_OF_CURRENT_ILLNESS/INJURY: NO
ADLS_ACUITY_SCORE: 35
DRESSING/BATHING_DIFFICULTY: NO
ADLS_ACUITY_SCORE: 28
ADLS_ACUITY_SCORE: 28
DIFFICULTY_EATING/SWALLOWING: NO
ADLS_ACUITY_SCORE: 28
FALL_HISTORY_WITHIN_LAST_SIX_MONTHS: NO

## 2022-10-25 NOTE — CARE PLAN
Prior to Admission Medication Reconciliation:     Medications added:   [] None  [x] As listed below:    OTC ibu- pt uses frequently for back pain    Medications deleted:   [x] None  [] As listed below:    Medications marked for review/removal by attending:  [] None  [x] As listed below:     lexapro- Pt reports that he is supposed to be on an antidepressant but wasn't sure which it was, he reports stopping over 2 months ago because he didn't like the way it made him feel. Per chart and pharmacy records it looks like he was on zoloft (he also mentioned history of this) and then switched to lexapro on 7/27/2021. Pharmacy reports he hasn't filled the lexapro in over a year, so if he was taking it up until 2 months ago, compliancy may not have been 100%.     Changes made to existing medications:   [] None  [] Updated strengths and frequencies to most current.   [x] As listed below:    Updated strength and frequency of hydroxyzine and oxycodone    Pertinent notes/medications patient takes different than prescribed:     Hydroxyzine and oxy- pt reports he has at home but has not had to take since his surgery    Last times/dates taken verified with patient:  [x] Yes- completed myself  [] Prepared PTA medlist for review only. (will not be available to review personally)  [] Did not review with patient. Rx verification only. Review completed by nursing.    [] Nurse completed no changes made (double checked entries)  [] Unable to review with patient at this time:    Allergies listed at another location:  []Allergies match allergies listed in Epic  []Other allergies listed:    Allergy review:    [x]Did not review: reviewed by nursing  []Did not review: pt unable at this time  []Verified current existing allergies or NKDA: no changes made   []Patient confirmed current existing allergies and new allergies added:    Medication reconciliation sources:   [x]Patient  []Patient family member/emergency contact: **  [] [facility] Report  Review  [x]Epic Chart Review  [x]Care Everywhere review  [x]Pharmacy med list/phone call: [facility]  [x]Outside meds dispense report: see below  []Nursing home or Assisted Living MAR:  []Other: **    Pharmacy desired at discharge: [facility]    Is patient on coumadin?   [x]No  []Yes    Requests for consultation by provider or pharmacist:   [x] Patient understands why all of their meds were prescribed and how to take them. No questions.   [] Managing party has no questions.   [] Patient/ managing party has questions about the following:  [] Did not review with patient. Cannot assess.     Fill dates and reported compliancy:  [] Fill dates coincide with reported compliancy for all/most maintenance meds.   [] Not applicable. Patient is not taking any maintenance medications at this time.   [x] Fill dates do not coincide with compliancy with maintenance meds. See notes in PTA medlist and in comments.    [] Fill dates do not coincide with the following medications but pt reports compliancy:  [] Did not review with patient. Cannot assess.     Historian accuracy:  [x] Excellent- alert and oriented, understands why meds were prescribed and how to take, able to answer specifics  [] Good- alert and oriented, understands why meds were prescribed and how to take, some confusion   [] Fair- alert and oriented, doesn't know medications without list, cannot answer specifics about medications, but has a decent process for which to take at home  [] Poor- does not know medications, may not have a process to take at home, may be cognitively unable to review at this time  []Medication management done by family member or facility, no concerns about historian accuracy.   [] Did not review with patient. Cannot assess.     Medication Management:  [x] Manages meds independently  [] Family member/ other party manages meds/assists:  [] Meds managed by staff at facility  [] Meds set up by home care, family/other party helps administer  [] Meds set  up by home care, self administers  [] Did not review with patient. Cannot assess.     Other medications aside from PTA:  [x] Denies taking any medications aside from those listed in PTA meds  [] Reports taking another medication(s) but cannot recall the name(s)  [] Refuses to say.  [] Did not review with patient. Cannot assess.     Comments: none     Chantelle Fine on 10/25/2022 at 12:45 PM       Notifying appropriate party of changes/additions/discrepancies:  []No pertinent changes made, notification not necessary.   [] Notified attending provider via text page/phone call  [] Notified attending provider in person  [] Notified pharmacy  [] Notified nurse  [x] Medications have not been reconciled by a provider yet, notification not necessary  [] Pt is not admitted to floor yet, PTA meds completed before admission.     Medications Prior to Admission   Medication Sig Dispense Refill Last Dose     escitalopram (LEXAPRO) 10 MG tablet Take 10 mg by mouth daily        hydrOXYzine (VISTARIL) 25 MG capsule Take 1-2 capsules by mouth every 4 hours as needed (every 4-6 hours)   More than a month     ibuprofen (ADVIL/MOTRIN) 200 MG tablet Take 400 mg by mouth 2 times daily as needed (arthritic back pain)   Past Week     oxyCODONE (ROXICODONE) 5 MG tablet Take 1-2 tablets by mouth every 4 hours as needed (every 4-6 hours)   More than a month          Medication Dispense History (from 10/25/2021 to 10/25/2022)  Expand All  Collapse All  Ibuprofen   Dispensed Days Supply Quantity Provider Pharmacy   ibuprofen 800 mg tablet 04/26/2022 30 90 tablet Jimbo Mensah MD [facility]      hydrOXYzine Pamoate   Dispensed Days Supply Quantity Provider Pharmacy   hydroxyzine pamoate 25 mg capsule 04/26/2022 4 40 capsule Jimbo Mensah MD [facility]      oxyCODONE HCl   Dispensed Days Supply Quantity Provider Pharmacy   oxycodone 5 mg tablet 04/26/2022 7 40 tablet Jimbo Mensah MD [facility]        Disclaimer    Certain dispenses may not be available or accurate in this report, including over-the-counter medications, low cost prescriptions, prescriptions paid for by the patient or non-participating sources, or errors in insurance claims information. The provider should independently verify medication history with the patient.  External Sources

## 2022-10-25 NOTE — ED NOTES
Pt updated about acceptance to [facility] and the ETA for transport. Pt agreeable to transfer and signed paperwork.

## 2022-10-25 NOTE — ED PROVIDER NOTES
[facility] ED Mental Health Handoff Note:       Brief HPI:  This is a 57 year old male signed out to me by Dr. Novak.  See initial ED Provider note for full details of the presentation.     No acute events overnight. Plan for transfer to [place] later this morning.     Patient did confirm to me that he did attempt to harm himself by taking narcotic medications as well as alcohol.  Was in agreement for transfer to mental health facility.    Home meds reviewed and ordered/administered: Yes    Medically stable for inpatient mental health admission: Yes.    Evaluated by mental health: Yes. The recommendation is for inpatient mental health treatment. Bed search in process    Safety concerns: At the time I received sign out, there were no safety concerns.    Hold Status:  Active Orders   N/A            Exam:   Patient Vitals for the past 24 hrs:   BP Pulse Resp SpO2   10/24/22 1530 136/76 60 16 97 %           ED Course:    Medications   hydrOXYzine (ATARAX) tablet 50 mg (has no administration in time range)   multivitamin, therapeutic (THERA-VIT) tablet 1 tablet (1 tablet Oral Given 10/23/22 2317)   folic acid (FOLVITE) tablet 1 mg (1 mg Oral Given 10/23/22 2318)   thiamine (B-1) tablet 100 mg (100 mg Oral Given 10/23/22 2318)   magnesium oxide (MAG-OX) tablet 800 mg (800 mg Oral Given 10/23/22 2318)            There were no significant events during my shift.          Impression:    ICD-10-CM    1. Suicide attempt (H)  T14.91XA           Plan:    1. Transferred to [place] for further evaluation and management of depression and suicidal ideation.     Was placed on hold at request of receiving facility.       RESULTS:   No results found for this visit on 10/23/22 (from the past 24 hour(s)).          MD Alexis Culp Richard J, MD  10/26/22 0601

## 2022-10-25 NOTE — H&P
[facility]    History and Physical  Medical Services       Date of Admission:  10/25/2022  Date of Service (when I saw the patient): 10/25/22    Assessment & Plan     Active Problems:  Chronic back pain- pt reports he takes ibuprofen for pain but would like to try lidoderm patches. He reports the back pain is worse at night. Lidoderm patch ordered. Tylenol and ibuprofen prn.     S/p right hip replacement- he reports surgery was in April 2022. Denies any issues or pain.     S/p right knee replacement- pt reports total knee replacement in 2020. He was suppose to have the left knee replacement on Monday but he cancelled this due to insurance issues. He states he is going to wait until his left knee pain is worse. Denies any pain.     Prediabetes- A1c on 10/3 was 6.3. Has been prediabetic for several years according to chart review. Discussed with him about starting on Metformin. He is interested in starting this. Discussed the side effects and pt verbalized understanding. Metformin 500 mg at dinner ordered. Will need to follow up with PCP for management. He is aware and will get an appointment.     Abnormal TSH- TSH mildly elevated. 4.41. Free T4 wnl at 1.03. No prior hx of hypothyroidism. Last TSH in Epic from 2 years ago wnl. I recommend repeating his TSH in a couple weeks.       Code Status: Full Code    Paula Owens CNP    Primary Care Physician   No Fierro    Chief Complaint   Psych evaluation     History is obtained from the patient and medical chart     History of Present Illness   (per ED) Daniel Schultz is a 57 year old male who presents with hyperlipidemia WILKINSON, prediabetes gastroesophageal reflux and excessive whiskey tonight.  Son expressed concern that his father texted and about drinking too much and alluding to that he may be dead in the morning.    Past Medical History    I have reviewed this patient's medical history and updated it with pertinent information if needed.   Past  Medical History:   Diagnosis Date     Moderate single current episode of major depressive disorder (H)        Past Surgical History   I have reviewed this patient's surgical history and updated it with pertinent information if needed.  Past Surgical History:   Procedure Laterality Date     ARTHROSCOPY ANKLE, OPEN REPAIR LIGAMENT, COMBINED Right 1/5/2017    Procedure: COMBINED ARTHROSCOPY ANKLE, OPEN REPAIR LIGAMENT;  Surgeon: Avinash Dick DPM;  Location: [facility]     ARTHROSCOPY KNEE IRRIGATION AND DEBRIDEMENT  9/27/2013    Procedure: ARTHROSCOPY KNEE IRRIGATION AND DEBRIDEMENT;;  Surgeon: Ley, Jeffrey Duane, MD;  Location: [facility]     ARTHROSCOPY KNEE WITH MEDIAL MENISCECTOMY  9/27/2013    Procedure: ARTHROSCOPY KNEE WITH MEDIAL MENISCECTOMY;  Right Knee Arthroscopy Partial Menisectomy and Debridement,Left Knee Arthroscopy Debridement and Loose Body Removal--needs assist.;  Surgeon: Ley, Jeffrey Duane, MD;  Location: [facility]     COLONOSCOPY N/A 8/17/2016    Procedure: COLONOSCOPY;  Surgeon: Rudolph Nazario MD;  Location: [facility]     HEMORRHOIDECTOMY  2000    Internal and external hemorrhoidectomy     REPAIR TENDON ANKLE Right 1/25/2018    Procedure: REPAIR TENDON ANKLE;  Right Ankle Posterior Tibial Tendon Repair,Peroneus Brevis Repair, Medial Ligament Repair;  Surgeon: Avinash Dick DPM;  Location: [facility] OR     REPAIR TENDON FOOT Right 4/26/2018    Procedure: REPAIR TENDON FOOT;  Right insertional posterior tibial tendon repair and tendon transfer.;  Surgeon: Avinash Dick DPM;  Location: [facility]     SURGICAL HISTORY OF -   6/2001    Left Achilles tendon repair       Prior to Admission Medications   Prior to Admission Medications   Prescriptions Last Dose Informant Patient Reported? Taking?   escitalopram (LEXAPRO) 10 MG tablet   Yes Yes   Sig: Take 10 mg by mouth daily   hydrOXYzine (VISTARIL) 25 MG capsule More than a month Self Yes Yes   Sig: Take 1-2 capsules by mouth every 4 hours as needed  (every 4-6 hours)   ibuprofen (ADVIL/MOTRIN) 200 MG tablet Past Week  Yes Yes   Sig: Take 400 mg by mouth 2 times daily as needed (arthritic back pain)   oxyCODONE (ROXICODONE) 5 MG tablet More than a month Self Yes Yes   Sig: Take 1-2 tablets by mouth every 4 hours as needed (every 4-6 hours)      Facility-Administered Medications: None     Allergies   Allergies   Allergen Reactions     Nkda [No Known Drug Allergies]        Social History   I have reviewed this patient's social history and updated it with pertinent information if needed. Daniel Schultz  reports that he has never smoked. He has never used smokeless tobacco. He reports current alcohol use. He reports that he does not use drugs.    Family History   I have reviewed this patient's family history and updated it with pertinent information if needed.   Family History   Problem Relation Age of Onset     Aneurysm Mother      Depression Father        Review of Systems   CONSTITUTIONAL:  negative  EYES:  negative  HEENT:  negative  RESPIRATORY:  negative  CARDIOVASCULAR:  negative  GASTROINTESTINAL:  negative  GENITOURINARY:  negative  INTEGUMENT/BREAST:  negative  HEMATOLOGIC/LYMPHATIC:  negative  ALLERGIC/IMMUNOLOGIC:  negative  ENDOCRINE:  negative  MUSCULOSKELETAL:  Negative except chronic back pain   NEUROLOGICAL:  negative    Physical Exam   Temp: 99.3  F (37.4  C) Temp src: Temporal BP: 141/90 Pulse: 81   Resp: 16 SpO2: 98 % O2 Device: None (Room air)    Vital Signs with Ranges  Temp:  [98.6  F (37  C)-99.3  F (37.4  C)] 99.3  F (37.4  C)  Pulse:  [60-82] 81  Resp:  [16] 16  BP: (120-141)/(76-93) 141/90  SpO2:  [97 %-99 %] 98 %  210 lbs 3.2 oz    Constitutional: awake, alert, cooperative, no apparent distress, and appears stated age, vitals stable  Eyes: Lids and lashes normal, pupils equal, round and reactive to light, extra ocular muscles intact, sclera clear, conjunctiva normal  ENT: Normocephalic, without obvious abnormality, atraumatic, external  ears without lesions, oral pharynx with moist mucous membranes, no erythema or exudates, gums normal   Hematologic / Lymphatic: no cervical lymphadenopathy  Respiratory: No increased work of breathing, good air exchange, clear to auscultation bilaterally, no crackles or wheezing  Cardiovascular: Normal apical impulse, regular rate and rhythm, normal S1 and S2, no S3 or S4, and no murmur noted  GI: No scars, normal bowel sounds, soft, non-distended, non-tender, no masses palpated, no hepatosplenomegally  Genitounirinary: deferred  Skin: normal skin color, texture, turgor and no redness, warmth, or swelling  Musculoskeletal: There is no redness, warmth, or swelling of the joints.  Full range of motion noted.    Neurologic: Awake, alert, oriented to name, place and time.    Neuropsychiatric: General: normal, calm and normal eye contact    Data   Data reviewed today:   Recent Labs   Lab 10/23/22  2311   WBC 8.6   HGB 14.0   MCV 88         POTASSIUM 3.6   CHLORIDE 102   CO2 25   BUN 12.7   CR 1.16   ANIONGAP 11   GERALD 8.9   *   ALBUMIN 4.0   PROTTOTAL 6.9   BILITOTAL 0.5   ALKPHOS 64   ALT 39   AST 26   LIPASE 20       No results found for this or any previous visit (from the past 24 hour(s)).

## 2022-10-25 NOTE — PROGRESS NOTES
10/25/22 1401   Patient Belongings   Did you bring any home meds/supplements to the hospital?  No   Patient Belongings locker;sent to security per site process   Patient Belongings Put in Hospital Secure Location (Security or Locker, etc.) cell phone/electronics;clothing;shoes   Belongings Search Yes   Clothing Search Yes   Second Staff Karey RAMOS   Comment grey shirt, black pants, black tennis shoes, rust colored scrubs, white socks, grey underware, phone    List items sent to safe: cell phone in black otter box (several scratches)    All other belongings put in assigned cubby in belongings room.       I have reviewed my belongings list on admission and verify that it is correct.     Patient signature_______________________________    Second staff witness (if patient unable to sign) ______________________________       I have received all my belongings at discharge.    Patient signature________________________________    Silvana   10/25/2022  2:04 PM

## 2022-10-25 NOTE — ED PROVIDER NOTES
Bed became available at [facility].  We will plan on transfer.  No further acute events during my shift.  Continues to be medically stable, and appropriate for mental health hospitalization     Reuben Novak MD  10/25/22 7526
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
## 2022-10-25 NOTE — PLAN OF CARE
"  Problem: Adult Behavioral Health Plan of Care  Goal: Patient-Specific Goal (Individualization)  Description: Patient will sleep 6 to 8 hours per night  Patient will eat at least 50% of meals  Patient will attend at least 50% of groups  Patient will comply with recommendations of treatment team  Patient will remain medication compliant during hospitalization     Outcome: Progressing     Problem: Suicide Risk  Goal: Absence of Self-Harm  Outcome: Progressing     Problem: Suicidal Behavior  Goal: Suicidal Behavior is Absent or Managed  Outcome: Progressing   Goal Outcome Evaluation:       Pt. Has been up and about on unit, spending time in dayroom, denies suicidal ideation, states \"I just need to get things figured out\", cooperative with assessments, states he will be medication compliant, is able to make needs be known, social with peers, will continue to monitor progress.    Face to face end of shift report will be communicated to oncoming night shift RN.     Lulu Hickman RN  10/25/2022  5:42 PM                       "

## 2022-10-26 PROCEDURE — 250N000013 HC RX MED GY IP 250 OP 250 PS 637: Performed by: NURSE PRACTITIONER

## 2022-10-26 PROCEDURE — HZ2ZZZZ DETOXIFICATION SERVICES FOR SUBSTANCE ABUSE TREATMENT: ICD-10-PCS | Performed by: NURSE PRACTITIONER

## 2022-10-26 PROCEDURE — 99223 1ST HOSP IP/OBS HIGH 75: CPT | Mod: AI | Performed by: NURSE PRACTITIONER

## 2022-10-26 PROCEDURE — 124N000001 HC R&B MH

## 2022-10-26 RX ORDER — FOLIC ACID 1 MG/1
1 TABLET ORAL DAILY
Status: DISCONTINUED | OUTPATIENT
Start: 2022-10-26 | End: 2022-10-28 | Stop reason: HOSPADM

## 2022-10-26 RX ORDER — DIAZEPAM 10 MG/2ML
5-10 INJECTION, SOLUTION INTRAMUSCULAR; INTRAVENOUS EVERY 30 MIN PRN
Status: DISCONTINUED | OUTPATIENT
Start: 2022-10-26 | End: 2022-10-28 | Stop reason: HOSPADM

## 2022-10-26 RX ORDER — ESCITALOPRAM OXALATE 10 MG/1
10 TABLET ORAL DAILY
Status: DISCONTINUED | OUTPATIENT
Start: 2022-10-26 | End: 2022-10-26

## 2022-10-26 RX ORDER — MULTIPLE VITAMINS W/ MINERALS TAB 9MG-400MCG
1 TAB ORAL DAILY
Status: DISCONTINUED | OUTPATIENT
Start: 2022-10-26 | End: 2022-10-28 | Stop reason: HOSPADM

## 2022-10-26 RX ORDER — ESCITALOPRAM OXALATE 10 MG/1
10 TABLET ORAL DAILY
Status: DISCONTINUED | OUTPATIENT
Start: 2022-10-26 | End: 2022-10-28 | Stop reason: HOSPADM

## 2022-10-26 RX ORDER — DIAZEPAM 5 MG
10 TABLET ORAL EVERY 30 MIN PRN
Status: DISCONTINUED | OUTPATIENT
Start: 2022-10-26 | End: 2022-10-28 | Stop reason: HOSPADM

## 2022-10-26 RX ADMIN — METFORMIN HYDROCHLORIDE 500 MG: 500 TABLET, FILM COATED ORAL at 17:16

## 2022-10-26 RX ADMIN — FOLIC ACID 1 MG: 1 TABLET ORAL at 14:45

## 2022-10-26 RX ADMIN — Medication 5 MG: at 21:04

## 2022-10-26 RX ADMIN — MULTIPLE VITAMINS W/ MINERALS TAB 1 TABLET: TAB at 14:45

## 2022-10-26 RX ADMIN — ESCITALOPRAM OXALATE 10 MG: 10 TABLET ORAL at 12:40

## 2022-10-26 RX ADMIN — Medication 2 PATCH: at 20:19

## 2022-10-26 RX ADMIN — Medication 100 MG: at 14:45

## 2022-10-26 ASSESSMENT — ACTIVITIES OF DAILY LIVING (ADL)
ADLS_ACUITY_SCORE: 28
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
## 2022-10-26 NOTE — PLAN OF CARE
Face to face end of shift report will be communicated to oncoming RN.     Problem: Adult Behavioral Health Plan of Care  Goal: Patient-Specific Goal (Individualization)  Description: Patient will sleep 6 to 8 hours per night  Patient will eat at least 50% of meals  Patient will attend at least 50% of groups  Patient will comply with recommendations of treatment team  Patient will remain medication compliant during hospitalization     Outcome: Progressing     Problem: Suicide Risk  Goal: Absence of Self-Harm  Outcome: Progressing     Problem: Suicidal Behavior  Goal: Suicidal Behavior is Absent or Managed  Outcome: Progressing   Goal Outcome Evaluation:  Face to face end of shift report obtained from RACHEL Lawson. Pt observed resting in bed.   Pt appears to be sleeping in bed with eyes closed, having regular respirations, and position changes. 15 minutes and PRN safety checks completed with no noted complains. No self harm noted or reported so far this shift.   0600-Pt appeared to had slept 6 hours.

## 2022-10-26 NOTE — PLAN OF CARE
"Face to face end of shift report received from RACHEL Barajas. Rounding completed. Patient observed sitting in lounge watching television.     Pt calm, cooperative and associating with staff/peers. Pt took a shower this AM and has been attending groups frequently. Pt denies pain, SI/HI and denies hallucinations. Pt states he slept \"okay\" but woke up a few times. Pt ate 100% of breakfast and lunch. Per NP 72 hour hold paper work was found in pt electronic chart. Pt informed of 72 hour hold initiated on 10/25/2022 at 07:36 and ends on 10/28/2022 at 7:36. When asked if pt was aware of 72 hour hold he states, \"they told me I was voluntary but I had to be on a hold for transportation to here.\" Pt wondering if he will be able to discharge after the hold is up because he wants to let his children know when to leave the cities.    Face to face report will be communicated with oncoming RN.    Karey Byrne RN  10/26/2022  7:35 AM   Problem: Adult Behavioral Health Plan of Care  Goal: Patient-Specific Goal (Individualization)  Description: Patient will sleep 6 to 8 hours per night  Patient will eat at least 50% of meals  Patient will attend at least 50% of groups  Patient will comply with recommendations of treatment team  Patient will remain medication compliant during hospitalization  Outcome: Progressing     Problem: Suicide Risk  Goal: Absence of Self-Harm  Outcome: Progressing    Problem: Suicidal Behavior  Goal: Suicidal Behavior is Absent or Managed  10/26/2022 0735 by Karey Byrne, RN  Note: Pt will remain free from self-harm during hospitalization  Pt will identify three coping skills prior to discharge  Pt will report suicidal ideation to staff  10/26/2022 0733 by Karey Byrne, RN  Outcome: Progressing                       "

## 2022-10-26 NOTE — PLAN OF CARE
Problem: Adult Behavioral Health Plan of Care  Goal: Patient-Specific Goal (Individualization)  Description: Patient will sleep 6 to 8 hours per night  Patient will eat at least 50% of meals  Patient will attend at least 50% of groups  Patient will comply with recommendations of treatment team  Patient will remain medication compliant during hospitalization     Outcome: Progressing     Problem: Suicide Risk  Goal: Absence of Self-Harm  Outcome: Progressing     Problem: Suicidal Behavior  Goal: Suicidal Behavior is Absent or Managed  Outcome: Progressing   Goal Outcome Evaluation:       Pt.has been up and about on unit, cooperative with assessments, spending time in dayroom, social with peers, appetite is good, eating at least 75% of meals, endorses depression, denies anxiety and suicidal ideation, is medication compliant, attending most group therapy sessions,  is able to make needs be known, will continue to monitor progress.    Face to face end of shift report will be communicated to oncoming night shift RN.     Lulu Hickman RN  10/26/2022  5:48 PM

## 2022-10-26 NOTE — DISCHARGE INSTRUCTIONS
Behavioral Discharge Planning and Instructions    Summary: Daniel is a 57 year old male with a history of MDD, JOSE ALFREDO and PTSD admitted on a 72 hour hold from [facility] ED following a suicide attempt by ingestion of alcohol and pain pills following a legal accusation    Main Diagnosis: 1. Major depressive disorder, Recurrent episode, Moderate                           2. Generalized anxiety disorder                              3. Posttraumatic stress disorder    Health Care Follow-up:     Resources listed below    [facility] [place]  [address]  [place] [address]  Phone: [phone]    [facility]  [address]  [place] [address]  Phone: [phone]    [facility]  [address] [place] [address]  Phone: [phone]    NUMBERS TO CALL IN CRISIS    National Suicide Prevention Lifeline ([facility])  1-317.351.8308    Crisis Text Line ([place])  Text  HOME  to 319378    Mental Health Crisis Line ([place], [place])  729.979.7428    [facility] ([place], [place])   615.749.4994      If you are feeling very unsafe, call 911 or bring yourself to the Emergency Room        Counseling in [place]:  Rosas Nielson           [phone]  Jessica [facility]    Kayy Booth [facility]      [phone]  [facility]     Maribel Feliciano [facility] (young kids)    [phone]   Anna Scott [facility] (older kids & adults)  [phone]   Jose Pratt RANJANA      [phone]  [facility]       [phone]   Balaji Hill MS, LP   Michelle Fulton MA, LPC   Eitan Sultana MS psychotherapist   Mirna Gilman MS, LPC   Estrellita English RANJANA, counselor   Ernestine Hill RANJANA, counselor  [place]        [phone]  Conner Butler, counseling  Dr Marsha Aponte, psychiatry  Cheryl Fair, counseling  Ronn Tinajero, counseling  Ramiro Lucas, counseling  Graciela Lazar,  psychiatry   [facility]       [phone]   Gema Ma MA, LMFT, RPFS   Jenn Marte MSW, [facility]          [phone]  [facility]      [phone]   Ernestine Gordon MS, [facility]  [facility]          [phone]        Marianela Cornelius MSW, LICSW , C-MI   Kelly Sanchez MSW, LGSW                                                    Ky Pospeck [facility]      Cher Zendejas MS, [facility]                [phone]      Valentina Nolasco PsyD     Molly Cordero PsyD, owner      Romy Dubonn PsyD    Loree Espinosa MPAS, PA-C    Hien Ewing PhD   Selin Acosta MSW, LICSW [facility]       [phone]   Claudine Donohue [facility]   Ferny Ellis APRN, CNP,     Justina Pérez MSW, LGSW (adolescents)   Jackie Grinnel APRN, CNP (Hib via telehealth; adolescents)   Flavia WASSERMAN, CNP (Hib via telehealth)   Gerson Vera MA, LPC, BCIA (Hib via telehealth)   Kristylexi Schmidt [facility] MSW, SW   Paula Quick [facility]   Adrian Lo DNP, APRN, CNP   Shirley Joy RN, BSN   Dottie Carlton RN-BC, BSN (Hib via telehealth)  [facility]         [phone]   Isis Lane, MSN, [facility]           [phone]   Kolton Castellon MA, LMFT   Savannah Blackwood MS RN [facility] NP   Ernestine Miranda, [facility]         [phone]  [facility]       [phone]   [facility]   Lani Escalera (to be [facility])   Ever Perez (to be [facility])      Counseling in [place]:  [facility]       [phone]   mental health & CD counseling  Gerson Jaeger       [phone]  [facility]       [phone]  [facility]        [phone]   Gema Loomis  [facility]       [facility]              [phone]   can do weekends and evenings   DeLorr Marilyn, MSW, LICSW, LCSW, CCTP    Angelito Krishnan MA, LMFT, LICSW  Kenji Alejandro       evenings,  weekends  [phone]      Counseling in [place]:  [facility]         [phone]   Isis Lane, MSN, PMHNP-BC   Cristina Dsouza MA, [facility]      [phone]  [facility]       [phone]   Vanessa Booth PMFT  [facility]       Lulu Rogers       [phone]  [facility]    [address]     [place] [facility]      [phone]  [facility]     Ronn Lyons MS Ed, LMFT    [phone]    (kids) denotes providers who also see kids        Attend all scheduled appointments with your outpatient providers. Call at least 24 hours in advance if you need to reschedule an appointment to ensure continued access to your outpatient providers.     Major Treatments, Procedures and Findings:  You were provided with: a psychiatric assessment, assessed for medical stability, medication evaluation and/or management, group therapy, family therapy, individual therapy, CD evaluation/assessment, milieu management, and medical interventions    Symptoms to Report: feeling more aggressive, increased confusion, losing more sleep, mood getting worse, or thoughts of suicide    Early warning signs can include: increased depression or anxiety sleep disturbances increased thoughts or behaviors of suicide or self-harm  increased unusual thinking, such as paranoia or hearing voices    Safety and Wellness:  Take all medicines as directed.  Make no changes unless your doctor suggests them.      Follow treatment recommendations.  Refrain from alcohol and non-prescribed drugs.  Ask your support system to help you reduce your access to items that could harm yourself or others. Items could include:  Firearms  Medicines (both prescribed and over-the-counter)  Knives and other sharp objects  Ropes and like materials  Car keys  If there is a concern for safety, call 911. If there is a concern for safety, call 911.    Resources:  "  Crisis Intervention: 894.964.6536 or 932-411-7041 (TTY: 598.732.1454).  Call anytime for help.  National [place] on Mental Illness (www.[place].lupis.org): 509.908.6428 or 026-255-3337.  Alcoholics Anonymous (www.alcoholics-anonymous.org): Check your phone book for your local chapter.  Suicide Awareness Voices of Education (SAVE) (www.save.org): 954-272-YKVL (4145)  National Suicide Prevention Line (www.mentalhealthmn.org): 534-977-OQGP (0247)  Mental Health Consumer/Survivor Network of [place] (www.mhcsn.net): 122.626.9040 or 782-610-9941  Mental Health Association of [place] (www.mentalhealth.org): 189.684.8889 or 671-354-6838  Text 4 Life: txt \"LIFE\" to 53049 for immediate support and crisis intervention  Crisis text line: Text \"[place]\" to 736608. Free, confidential, 24/7.  Crisis Intervention: 989.196.9237 or 714-164-5262. Call anytime for help.     General Medication Instructions:   See your medication sheet(s) for instructions.   Take all medicines as directed.  Make no changes unless your doctor suggests them.   Go to all your doctor visits.  Be sure to have all your required lab tests. This way, your medicines can be refilled on time.  Do not use any drugs not prescribed by your doctor.  Avoid alcohol.    Advance Directives:   Scanned document on file with [facility]? No scanned doc  Is document scanned? Pt states no documents  Honoring Choices Your Rights Handout: Informed and given  Was more information offered? Pt declined    The Treatment team has appreciated the opportunity to work with you. If you have any questions or concerns about your recent admission, you can contact the unit which can receive your call 24 hours a day, 7 days a week. They will be able to get in touch with a Provider if needed. The unit number is [phone] .  "

## 2022-10-26 NOTE — H&P
"[facility] [place] PSYCHIATRY   HISTORY AND PHYSICAL     ADMISSION DATA     Daniel Schultz MRN# [id number]   Age: 57 year old YOB: 1965     Date of Admission: 10/25/2022  Primary Physician: No Fierro        CHIEF COMPLAINT   \"I tried to commit suicide.\"       HISTORY OF PRESENT ILLNESS     Daniel is a 57 year old male with a history of MDD, JOSE ALFREDO and PTSD admitted on a 72 hour hold from [facility] ED following a suicide attempt by ingestion of alcohol and pain pills following a legal accusation for . He had sent text messages to several people which were goodbye messages alluding to his plan to end his life.  For example, he sent a text to his son saying how much he loves him and several to his ex wife apologizing for things he has done as well as telling her their children will need her more than ever now. Patient's son brought him to the ED for evaluation after receiving a concerning text from his father.     On admission, patient alludes to his suicide attempt as a \"cry for help.\" Patient is retired , was convicted of Stalking with intent to harm and Misconduct of a  in 2021. Patient remains under court jurisdiction through at least 2024 with stay of imposition of one year incarceration, over $13,000 fees and fine, plus 20 hours per year of community service. Patient states another accusation came forward last Saturday, which is what precipitated this suicide attempt. He has court 11/28/22. Patient reported feeling hopeless about this newest accusation, but is no longer feeling this way, denying current SI.     Patient reports lengthy history of depression and anxiety. Found Lexapro to be helpful, but stopped taking it as he thought he didn't need it anymore, but now realizes that is not the case.    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  He is experiencing flashbacks as " "recurrent nightmares.  At this time, patient is at high risk of suicide.  He denies thoughts of harm to others. He denies hallucinations. Patient denies use of illicit drugs. He reports drinking socially, like \"maybe a beer a week.\" Blood alcohol level was 0.02 and urine drug screen was negative for all tested drugs of abuse.      PSYCHIATRIC REVIEW OF SYSTEMS     Psychiatric review of systems reveals: nervousness, panic, worries, prompted by past trauma triggers, legal issues, suicidal ideation, depressed mood, guilt, poor concentration, sleep difficulty, worthlessness, ruminative thoughts, hopelessness and helplessness, negative, negative, negative, negative, negative, history of trauma and nightmares.     PSYCHIATRIC HISTORY       Prior Psychiatric Diagnoses: yes, MDD, JOSE ALFREDO, and PTSD   Psychiatric Hospitalizations: none   History of Psychosis none   Suicide Attempts none   Self-Injurious Behavior: none   Violence Toward Others none   History of ECT: none   Use of Psychotropics yes, prescribed Zoloft and Lexapro in the past. States he did not like the way Zoloft made him feel.             SUBSTANCE USE HISTORY     Patient denies history of chemical dependency. Reports drinking socially.  He denies illicit drug use. BAL = 0.02 and urine drug screen was negative for all tested drugs of abuse.       SOCIAL HISTORY     Per crisis assessment completed in ED: Patient was born and raised in [facility]. His parents  when he was [age] years old. Patient describes his father as an alcoholic. He was raised by his mother, who moved around a lot. Patient stated that he attended a different school every year.  As a teen, he moved to [place] to live with his father, but that didn't work out, so he moved back to [place]. Patient graduated high school and completed a two year Law Enforcement degree. He later completed a Bachelor's degree in Business and a Master's degree in Administrative Justice. Patient began his career as a law "  in a small town and later transitioned to a large city. 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 Patient was  in 2021. He and his wife had three sons.  The older two are in law enforcement. The youngest son is in computers. Patient lives alone. He enjoys golfing, collecting sports cards, watching movies, and sports activities. He identifies as Mormonism and stated that he has very strong involvement in his li.        FAMILY HISTORY   Family history if alcohol dependence in patient's father.  There is no family history of suicide.      PAST MEDICAL HISTORY     Past Medical History:   Diagnosis Date     Moderate single current episode of major depressive disorder (H)      Allergies   Allergen Reactions     Nkda [No Known Drug Allergies]      Primary Care Clinic: [facility]   Primary Care Physician: No Fierro     MEDICATIONS   Prior to Admission medications    Medication Sig Start Date End Date Taking? Authorizing Provider   escitalopram (LEXAPRO) 10 MG tablet Take 10 mg by mouth daily   Yes Reported, Patient   hydrOXYzine (VISTARIL) 25 MG capsule Take 1-2 capsules by mouth every 4 hours as needed (every 4-6 hours) 4/26/22  Yes Reported, Patient   ibuprofen (ADVIL/MOTRIN) 200 MG tablet Take 400 mg by mouth 2 times daily as needed (arthritic back pain)   Yes Reported, Patient   oxyCODONE (ROXICODONE) 5 MG tablet Take 1-2 tablets by mouth every 4 hours as needed (every 4-6 hours) 4/26/22  Yes Reported, Patient        PHYSICAL EXAM/ROS     I have reviewed the physical exam as documented by Paula Owens CNP and agree with findings and assessment and have no additional findings to add at this time. The review of systems is negative other than noted in the HPI.       LABS   No results found for this or any previous visit (from the past 24 hour(s)).      MENTAL STATUS EXAM   Vitals: /88   Pulse 83   " Temp 97.3  F (36.3  C) (Tympanic)   Resp 16   Ht 1.803 m (5' 11\")   Wt 95.3 kg (210 lb 3.2 oz)   SpO2 95%   BMI 29.32 kg/m      Appearance:  awake, alert, adequately groomed and dressed in hospital scrubs  Attitude:  cooperative  Eye Contact:  good  Mood:  anxious and depressed  Affect:  mood congruent  Speech:  rapid  Psychomotor Behavior:  no evidence of tardive dyskinesia, dystonia, or tics  Thought Process:  logical, linear and goal oriented  Associations:  no loose associations  Thought Content:  no evidence of suicidal ideation or homicidal ideation and no evidence of psychotic thought  Insight:  fair  Judgment:  fair  Oriented to:  time, person, and place  Attention Span and Concentration:  fair  Recent and Remote Memory:  intact  Language: Able to name objects, Able to repeat phrases and Able to read and write  Fund of Knowledge: appropriate  Muscle Strength and Tone: normal  Gait and Station: Normal       ASSESSMENT     This is a 57 year old male with a PMH of MDD, JOSE ALFREDO, and PTSD who attempted suicide by overdose of pain meds with alcohol following a legal accusation. He sent good bye messages to several people. He was brought to the ED by his son. On admission he denies suicidal ideation and is anxious to discharge stating he needs to get back to work. He understands that he will likely be here through the duration of his hold that expires Friday morning. He is interested in restarting Lexapro and establishing psythotherapy.     DIAGNOSIS     1. Major depressive disorder, Recurrent episode, Moderate                           2. Generalized anxiety disorder                              3. Posttraumatic stress disorder       PLAN     Location: Unit 5  Legal Status: Orders Placed This Encounter      Emergency Hospitalization Hold (72 Hr Hold)    Safety Assessment:    Behavioral Orders   Procedures     Code 1 - Restrict to Unit     Routine Programming     As clinically indicated     Status 15     Every " 15 minutes.      PTA medications held:     -None    PTA medications continued/changed:     -Lexapro 10 mg QD    New medications initiated:     -CIWA protocol with Valium PRN for alcohol withdrawal  -Standard unit PRNs for comfort and safety    Programming: Patient will be treated in a therapeutic milieu with appropriate individual and group therapies. Education will be provided on diagnoses, medications, and treatments.     Medical diagnoses:  Per Paula Owens, CNP:  Active Problems:  Chronic back pain- pt reports he takes ibuprofen for pain but would like to try lidoderm patches. He reports the back pain is worse at night. Lidoderm patch ordered. Tylenol and ibuprofen prn.      S/p right hip replacement- he reports surgery was in April 2022. Denies any issues or pain.      S/p right knee replacement- pt reports total knee replacement in 2020. He was suppose to have the left knee replacement on Monday but he cancelled this due to insurance issues. He states he is going to wait until his left knee pain is worse. Denies any pain.      Prediabetes- A1c on 10/3 was 6.3. Has been prediabetic for several years according to chart review. Discussed with him about starting on Metformin. He is interested in starting this. Discussed the side effects and pt verbalized understanding. Metformin 500 mg at dinner ordered. Will need to follow up with PCP for management. He is aware and will get an appointment.      Abnormal TSH- TSH mildly elevated. 4.41. Free T4 wnl at 1.03. No prior hx of hypothyroidism. Last TSH in Epic from 2 years ago wnl. I recommend repeating his TSH in a couple weeks.     Consult: None  Tests: None    Anticipated LOS: 3 days   Disposition: home with medication management by PCP and psychotherapy       ATTESTATION      Mercy Davis NP

## 2022-10-27 PROCEDURE — 250N000013 HC RX MED GY IP 250 OP 250 PS 637: Performed by: NURSE PRACTITIONER

## 2022-10-27 PROCEDURE — 99232 SBSQ HOSP IP/OBS MODERATE 35: CPT | Performed by: NURSE PRACTITIONER

## 2022-10-27 PROCEDURE — 124N000001 HC R&B MH

## 2022-10-27 RX ORDER — ESCITALOPRAM OXALATE 10 MG/1
10 TABLET ORAL DAILY
Qty: 30 TABLET | Refills: 0 | Status: SHIPPED | OUTPATIENT
Start: 2022-10-27 | End: 2024-08-07

## 2022-10-27 RX ADMIN — ESCITALOPRAM OXALATE 10 MG: 10 TABLET ORAL at 08:22

## 2022-10-27 RX ADMIN — FOLIC ACID 1 MG: 1 TABLET ORAL at 08:22

## 2022-10-27 RX ADMIN — Medication 100 MG: at 08:22

## 2022-10-27 RX ADMIN — METFORMIN HYDROCHLORIDE 500 MG: 500 TABLET, FILM COATED ORAL at 17:17

## 2022-10-27 RX ADMIN — MULTIPLE VITAMINS W/ MINERALS TAB 1 TABLET: TAB at 08:22

## 2022-10-27 RX ADMIN — Medication 5 MG: at 21:18

## 2022-10-27 RX ADMIN — Medication 2 PATCH: at 20:04

## 2022-10-27 ASSESSMENT — ACTIVITIES OF DAILY LIVING (ADL)
ADLS_ACUITY_SCORE: 28

## 2022-10-27 NOTE — PROGRESS NOTES
"[facility] PSYCHIATRY  PROGRESS NOTE     SUBJECTIVE     Patient is working on a puzzle in the [facility].  He is pleasant and cooperative, full range affect.  He reports \"doing good\", slept \"ok\", denies suicidal or homicidal thought and has not been scoring on the CIWA.  He is aware his 72 hour hold is up in the morning and would like to have his son come tomorrow morning to pick him up.  He agrees to continue to take Lexapro and is interested in starting psychotherapy.  Patient has been appropriate on the unit with peers and staff, attends group programming.       MEDICATIONS   Scheduled Meds:    escitalopram  10 mg Oral Daily     folic acid  1 mg Oral Daily     lidocaine  2 patch Transdermal Q24h    And     lidocaine   Transdermal Q8H HENRIK     metFORMIN  500 mg Oral Daily with supper     multivitamin w/minerals  1 tablet Oral Daily     thiamine  100 mg Oral Daily     PRN Meds:.acetaminophen, alum & mag hydroxide-simethicone, diazepam **OR** diazepam, hydrOXYzine, ibuprofen, melatonin, OLANZapine **OR** OLANZapine, senna-docusate     ALLERGIES   Allergies   Allergen Reactions     Nkda [No Known Drug Allergies]         MENTAL STATUS EXAM   Vitals: /77   Pulse 73   Temp 97.1  F (36.2  C) (Tympanic)   Resp 16   Ht 1.803 m (5' 11\")   Wt 95.3 kg (210 lb 3.2 oz)   SpO2 96%   BMI 29.32 kg/m      Appearance:  awake, alert  Attitude:  cooperative  Eye Contact:  good  Mood:  better  Affect:  appropriate and in normal range  Speech:  clear, coherent  Psychomotor Behavior:  no evidence of tardive dyskinesia, dystonia, or tics  Thought Process:  logical and linear  Associations:  no loose associations  Thought Content:  no evidence of suicidal ideation or homicidal ideation and no evidence of psychotic thought  Insight:  fair  Judgment:  fair  Oriented to:  time, person, and place  Attention Span and Concentration:  intact  Recent and Remote Memory:  intact  Language: Able to name objects, Able to repeat phrases and " Able to read and write  Fund of Knowledge: appropriate  Muscle Strength and Tone: normal  Gait and Station: Normal       LABS   No results found for this or any previous visit (from the past 24 hour(s)).      IMPRESSION   Per H&P  This is a 57 year old male with a PMH of MDD, JOSE ALFREDO, and PTSD who attempted suicide by overdose of pain meds with alcohol following a legal accusation. He sent good bye messages to several people. He was brought to the ED by his son. On admission he denies suicidal ideation and is anxious to discharge stating he needs to get back to work. He understands that he will likely be here through the duration of his hold that expires Friday morning. He is interested in restarting Lexapro and establishing psythotherapy       DIAGNOSES     1. Major depressive disorder, Recurrent episode, Moderate                           2. Generalized anxiety disorder                              3. Posttraumatic stress disorder     PLAN     Location: Unit 5  Legal Status: Orders Placed This Encounter      Emergency Hospitalization Hold (72 Hr Hold)    Safety Assessment:    Behavioral Orders   Procedures     Code 1 - Restrict to Unit     Routine Programming     As clinically indicated     Status 15     Every 15 minutes.      PTA psychotropic medications stopped:     - None    PTA psychotropic medications continued/changed:     - Lexapro 10mg    New medications tried and stopped:     -None    New medications initiated:     - None    Today's Changes:    - None    Programming: Patient will be treated in a therapeutic milieu with appropriate individual and group therapies. Education will be provided on diagnoses, medications, and treatments.     Medical diagnoses:  Per medicine    Consult: None  Tests: None    Anticipated LOS: Discharge tomorrow with family  Disposition: Home with services       TREATMENT TEAM CARE PLAN     Progress: Continued symptoms.    Continued Stay Criteria/Rationale: Continued symptoms without  sufficient improvement/resolution.    Medical/Physical: See above.    Precautions: See above.     Plan: Continue inpatient care with unit support and medication management.    Rationale for change in precautions or plan: NA due to no change.    Participants: ESPERANZA Govea CNP, Nursing, SW, OT, Dr. Thompson.    The patient's care was discussed with the treatment team and chart notes were reviewed.       ATTESTATION      ESPERANZA Govea CNP

## 2022-10-27 NOTE — PLAN OF CARE
Problem: Adult Behavioral Health Plan of Care  Goal: Patient-Specific Goal (Individualization)  Description: Patient will sleep 6 to 8 hours per night  Patient will eat at least 50% of meals  Patient will attend at least 50% of groups  Patient will comply with recommendations of treatment team  Patient will remain medication compliant during hospitalization     Outcome: Progressing     Problem: Suicide Risk  Goal: Absence of Self-Harm  Outcome: Progressing     Problem: Suicidal Behavior  Goal: Suicidal Behavior is Absent or Managed  Outcome: Progressing   Goal Outcome Evaluation:       Pt. Up and about on unit, attending group therapy sessions, denies depression, anxiety and suicidal ideation, states he feels ready to go home tomorrow, cooperative with cares and assessments, taking prescribed medications, appetite is good, eating at least 75% of meals, will continue to monitor progress.  2118-  Pt. Requested/received melatonin 5 mg po for sleep.      Face to face end of shift report will be communicated to oncoming night shift RN.     Lulu Hickman RN  10/27/2022  5:10 PM

## 2022-10-27 NOTE — PLAN OF CARE
Face to face end of shift report received from RACHEL Middleton. Rounding completed. Patient observed walking in the hallway.     Pt calm, cooperative and polite this AM. Pt denies any pain, SI/HI and denies hallucinations. Pt attending and participating in every group. Pt associating frequently with peers and staff. Pt medication compliant and scoring 0 on the CIWA. Pt ate 100% of breakfast and 50% of lunch.     Face to face report will be communicated with oncoming staff.    Karey Byrne RN  10/27/2022  7:40 AM   Problem: Adult Behavioral Health Plan of Care  Goal: Patient-Specific Goal (Individualization)  Description: Patient will sleep 6 to 8 hours per night  Patient will eat at least 50% of meals  Patient will attend at least 50% of groups  Patient will comply with recommendations of treatment team  Patient will remain medication compliant during hospitalization   Outcome: Progressing      Problem: Suicide Risk  Goal: Absence of Self-Harm  10/27/2022 0745 by Karey Byrne, RN  Note: Pt will remain free from self-harm during hospitalization  Pt will identify three coping skills prior to discharge  Pt will report suicidal ideation to staff  Outcome: Progressing        Problem: Suicidal Behavior  Goal: Suicidal Behavior is Absent or Managed  Outcome: Progressing

## 2022-10-27 NOTE — PLAN OF CARE
Face to face shift report received from nurse. Rounding completed, pt observed.    Problem: Adult Behavioral Health Plan of Care  Goal: Patient-Specific Goal (Individualization)  Description: Patient will sleep 6 to 8 hours per night  Patient will eat at least 50% of meals  Patient will attend at least 50% of groups  Patient will comply with recommendations of treatment team  Patient will remain medication compliant during hospitalization  Outcome: Progressing   Pt has been in bed with eyes closed and regular respirations observed all night. Will continue to monitor. Patient slept 7 hours.     Face to face report will be communicated to oncoming RN.    Kane Le RN  10/27/2022

## 2022-10-27 NOTE — PLAN OF CARE
Spoke with pt this afternoon and he states he is ready to leave and that he has utilized all of his resources here on the unit. Pt states he would just like resources listed in his AVS as he is unsure when he will have time for therapy with the busy season of his job coming up. Pt states his son's sill be coming to pick him up tomorrow 10/28 around 10 or 11 AM. Encouraged pt to tell nursing when exactly so they could have him ready to go.     Resources listed in AVS.

## 2022-10-28 VITALS
WEIGHT: 210.2 LBS | DIASTOLIC BLOOD PRESSURE: 76 MMHG | BODY MASS INDEX: 29.43 KG/M2 | SYSTOLIC BLOOD PRESSURE: 132 MMHG | HEART RATE: 70 BPM | RESPIRATION RATE: 16 BRPM | OXYGEN SATURATION: 97 % | TEMPERATURE: 97.5 F | HEIGHT: 71 IN

## 2022-10-28 PROCEDURE — 250N000013 HC RX MED GY IP 250 OP 250 PS 637: Performed by: NURSE PRACTITIONER

## 2022-10-28 PROCEDURE — 99239 HOSP IP/OBS DSCHRG MGMT >30: CPT | Performed by: NURSE PRACTITIONER

## 2022-10-28 RX ORDER — LIDOCAINE 4 G/G
2 PATCH TOPICAL EVERY 24 HOURS
COMMUNITY
Start: 2022-10-28 | End: 2024-08-07

## 2022-10-28 RX ADMIN — MULTIPLE VITAMINS W/ MINERALS TAB 1 TABLET: TAB at 08:13

## 2022-10-28 RX ADMIN — ESCITALOPRAM OXALATE 10 MG: 10 TABLET ORAL at 08:13

## 2022-10-28 RX ADMIN — Medication 100 MG: at 08:13

## 2022-10-28 RX ADMIN — FOLIC ACID 1 MG: 1 TABLET ORAL at 08:13

## 2022-10-28 ASSESSMENT — ACTIVITIES OF DAILY LIVING (ADL)
ADLS_ACUITY_SCORE: 28

## 2022-10-28 NOTE — PLAN OF CARE
Pt is discharging at the recommendation of the treatment team. Pt is discharging to home transported by sons. Pt denies having any thoughts of hurting themself or anyone else. Pt denies anxiety or depression. Pt has resources listed. Discharge instructions, including; demographic sheet, psychiatric evaluation, discharge summary, and AVS were faxed to these next level of care providers.
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
## 2022-10-28 NOTE — PLAN OF CARE
Face to face end of shift report received from RACHEL Middleton. Rounding completed. Patient observed sitting in lounge watching television.     Pt calm, cooperative and socializing with peers and staff this AM. Pt excited to discharge. Pt denies SI/HI, pain and denies hallucinations. Pt medication compliant and able to make needs known.    Karey Byrne RN  10/28/2022  08:00 AM     Problem: Adult Behavioral Health Plan of Care  Goal: Plan of Care Review  Recent Flowsheet Documentation  Taken 10/28/2022 0730 by Karey Byrne, RN  Patient Agreement with Plan of Care: agrees  Goal: Patient-Specific Goal (Individualization)  Description: Patient will sleep 6 to 8 hours per night  Patient will eat at least 50% of meals  Patient will attend at least 50% of groups  Patient will comply with recommendations of treatment team  Patient will remain medication compliant during hospitalization     Outcome: Progressing  Goal: Develops/Participates in Therapeutic [place] to Support Successful Transition  Intervention: Foster Therapeutic [place]  Recent Flowsheet Documentation  Taken 10/28/2022 0730 by Karey Byren, RN  Trust Relationship/Rapport:    care explained    thoughts/feelings acknowledged    questions answered    questions encouraged

## 2022-10-28 NOTE — PLAN OF CARE
Face to face shift report received from nurse. Rounding completed, pt observed.    Problem: Adult Behavioral Health Plan of Care  Goal: Patient-Specific Goal (Individualization)  Description: Patient will sleep 6 to 8 hours per night  Patient will eat at least 50% of meals  Patient will attend at least 50% of groups  Patient will comply with recommendations of treatment team  Patient will remain medication compliant during hospitalization  Outcome: Progressing  Pt has been in bed with eyes closed and regular respirations observed all night. Will continue to monitor. Patient slept 5.5 hours.     Face to face report will be communicated to oncoming RN.    Kane Le RN  10/28/2022

## 2022-10-28 NOTE — PLAN OF CARE
Discharge Note    Patient Discharged to home on 10/28/2022 9:44 AM via Private Car accompanied by staff.     Patient informed of discharge instructions in AVS. patient verbalizes understanding and denies having any questions pertaining to AVS. Patient stable at time of discharge. Patient denies SI, HI, and thoughts of self harm at time of discharge. All personal belongings returned to patient. Discharge prescriptions sent to [facility] via electronic communication. Psych evaluation, history and physical, AVS, and discharge summary given/communicated to patient on discharge.     Karey Byrne RN  10/28/2022  9:42 AM     Problem: Adult Behavioral Health Plan of Care  Goal: Plan of Care Review  Outcome: Adequate for Care Transition  Flowsheets (Taken 10/28/2022 0730)  Patient Agreement with Plan of Care: agrees  Goal: Patient-Specific Goal (Individualization)  Description: Patient will sleep 6 to 8 hours per night  Patient will eat at least 50% of meals  Patient will attend at least 50% of groups  Patient will comply with recommendations of treatment team  Patient will remain medication compliant during hospitalization     10/28/2022 0946 by Karey Byrne RN  Outcome: Adequate for Care Transition  10/28/2022 0940 by Karey Byrne RN  Outcome: Progressing  Goal: Individualized Daily Interaction Plan (IDIP)  Outcome: Adequate for Care Transition  Goal: Adheres to Safety Considerations for Self and Others  Outcome: Adequate for Care Transition  Goal: Absence of New-Onset Illness or Injury  Outcome: Adequate for Care Transition  Goal: Optimized Coping Skills in Response to Life Stressors  Outcome: Adequate for Care Transition  Goal: Develops/Participates in Therapeutic [place] to Support Successful Transition  Outcome: Adequate for Care Transition  Intervention: Foster Therapeutic [place]  Recent Flowsheet Documentation  Taken 10/28/2022 0730 by Karey Byrne, RACHEL  Trust  Relationship/Rapport:    care explained    thoughts/feelings acknowledged    questions answered    questions encouraged   Goal Outcome Evaluation:    Plan of Care Reviewed With: patient

## 2022-11-22 ENCOUNTER — APPOINTMENT (OUTPATIENT)
Dept: GENERAL RADIOLOGY | Facility: CLINIC | Age: 57
End: 2022-11-22
Attending: EMERGENCY MEDICINE
Payer: COMMERCIAL

## 2022-11-22 ENCOUNTER — HOSPITAL ENCOUNTER (EMERGENCY)
Facility: CLINIC | Age: 57
Discharge: HOME | End: 2022-11-22
Attending: FAMILY MEDICINE | Admitting: FAMILY MEDICINE
Payer: COMMERCIAL

## 2022-11-22 VITALS
DIASTOLIC BLOOD PRESSURE: 97 MMHG | SYSTOLIC BLOOD PRESSURE: 134 MMHG | HEART RATE: 66 BPM | BODY MASS INDEX: 30.06 KG/M2 | TEMPERATURE: 98.5 F | RESPIRATION RATE: 16 BRPM | WEIGHT: 210 LBS | OXYGEN SATURATION: 97 % | HEIGHT: 70 IN

## 2022-11-22 DIAGNOSIS — T14.8XXA STAB WOUND: ICD-10-CM

## 2022-11-22 PROCEDURE — 250N000013 HC RX MED GY IP 250 OP 250 PS 637: Performed by: FAMILY MEDICINE

## 2022-11-22 PROCEDURE — 12001 RPR S/N/AX/GEN/TRNK 2.5CM/<: CPT

## 2022-11-22 PROCEDURE — 90715 TDAP VACCINE 7 YRS/> IM: CPT | Performed by: EMERGENCY MEDICINE

## 2022-11-22 PROCEDURE — 99284 EMERGENCY DEPT VISIT MOD MDM: CPT | Mod: 25 | Performed by: FAMILY MEDICINE

## 2022-11-22 PROCEDURE — 12001 RPR S/N/AX/GEN/TRNK 2.5CM/<: CPT | Performed by: FAMILY MEDICINE

## 2022-11-22 PROCEDURE — 71046 X-RAY EXAM CHEST 2 VIEWS: CPT

## 2022-11-22 PROCEDURE — 90471 IMMUNIZATION ADMIN: CPT | Performed by: EMERGENCY MEDICINE

## 2022-11-22 PROCEDURE — 250N000011 HC RX IP 250 OP 636: Performed by: EMERGENCY MEDICINE

## 2022-11-22 PROCEDURE — 99283 EMERGENCY DEPT VISIT LOW MDM: CPT | Mod: 25

## 2022-11-22 RX ORDER — CEPHALEXIN 500 MG/1
500 CAPSULE ORAL ONCE
Status: COMPLETED | OUTPATIENT
Start: 2022-11-22 | End: 2022-11-22

## 2022-11-22 RX ORDER — CEPHALEXIN 500 MG/1
500 CAPSULE ORAL 2 TIMES DAILY
Qty: 6 CAPSULE | Refills: 0 | Status: SHIPPED | OUTPATIENT
Start: 2022-11-22 | End: 2022-11-25

## 2022-11-22 RX ADMIN — CLOSTRIDIUM TETANI TOXOID ANTIGEN (FORMALDEHYDE INACTIVATED), CORYNEBACTERIUM DIPHTHERIAE TOXOID ANTIGEN (FORMALDEHYDE INACTIVATED), BORDETELLA PERTUSSIS TOXOID ANTIGEN (GLUTARALDEHYDE INACTIVATED), BORDETELLA PERTUSSIS FILAMENTOUS HEMAGGLUTININ ANTIGEN (FORMALDEHYDE INACTIVATED), BORDETELLA PERTUSSIS PERTACTIN ANTIGEN, AND BORDETELLA PERTUSSIS FIMBRIAE 2/3 ANTIGEN 0.5 ML: 5; 2; 2.5; 5; 3; 5 INJECTION, SUSPENSION INTRAMUSCULAR at 05:20

## 2022-11-22 RX ADMIN — CEPHALEXIN 500 MG: 500 CAPSULE ORAL at 06:39
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
## 2022-11-22 NOTE — DISCHARGE INSTRUCTIONS
RETURN TO THE EMERGENCY ROOM FOR THE FOLLOWING:    Severely worsened pain, expanding redness/tenderness/swelling, new difficulty with breathing, fever greater than 101, or at anytime for any concern.    FOLLOW UP:    With your primary clinic or the urgent care setting for stitch removal in 10 days.    TREATMENT RECOMMENDATIONS:    Topical antibiotic ointment once or twice daily until stitches are removed.    Keflex twice daily for 3 days.    NURSE ADVICE LINE:  [phone] or [phone]
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
## 2023-04-03 ENCOUNTER — TRANSFERRED RECORDS (OUTPATIENT)
Dept: HEALTH INFORMATION MANAGEMENT | Facility: CLINIC | Age: 58
End: 2023-04-03
Payer: COMMERCIAL

## 2023-04-05 ENCOUNTER — HOSPITAL ENCOUNTER (OUTPATIENT)
Dept: MRI IMAGING | Facility: CLINIC | Age: 58
Discharge: HOME | End: 2023-04-05
Attending: ORTHOPAEDIC SURGERY
Payer: COMMERCIAL

## 2023-04-05 DIAGNOSIS — M54.10 RADICULOPATHY: ICD-10-CM

## 2023-04-05 PROCEDURE — 72141 MRI NECK SPINE W/O DYE: CPT

## 2023-04-05 PROCEDURE — 72148 MRI LUMBAR SPINE W/O DYE: CPT

## 2023-04-09 ENCOUNTER — HEALTH MAINTENANCE LETTER (OUTPATIENT)
Age: 58
End: 2023-04-09

## 2023-05-22 ENCOUNTER — TRANSFERRED RECORDS (OUTPATIENT)
Dept: HEALTH INFORMATION MANAGEMENT | Facility: CLINIC | Age: 58
End: 2023-05-22
Payer: COMMERCIAL

## 2024-04-30 ENCOUNTER — MYC MEDICAL ADVICE (OUTPATIENT)
Dept: FAMILY MEDICINE | Facility: CLINIC | Age: 59
End: 2024-04-30
Payer: COMMERCIAL

## 2024-04-30 ENCOUNTER — E-VISIT (OUTPATIENT)
Dept: FAMILY MEDICINE | Facility: CLINIC | Age: 59
End: 2024-04-30
Payer: COMMERCIAL

## 2024-04-30 DIAGNOSIS — H10.32 ACUTE BACTERIAL CONJUNCTIVITIS OF LEFT EYE: Primary | ICD-10-CM

## 2024-04-30 PROCEDURE — 99421 OL DIG E/M SVC 5-10 MIN: CPT | Performed by: INTERNAL MEDICINE

## 2024-04-30 RX ORDER — POLYMYXIN B SULFATE AND TRIMETHOPRIM 1; 10000 MG/ML; [USP'U]/ML
SOLUTION OPHTHALMIC
Qty: 10 ML | Refills: 0 | Status: SHIPPED | OUTPATIENT
Start: 2024-04-30 | End: 2024-05-07

## 2024-05-03 ENCOUNTER — E-VISIT (OUTPATIENT)
Dept: FAMILY MEDICINE | Facility: CLINIC | Age: 59
End: 2024-05-03
Payer: COMMERCIAL

## 2024-05-03 DIAGNOSIS — M10.9 ACUTE GOUTY ARTHRITIS: Primary | ICD-10-CM

## 2024-05-03 PROCEDURE — 99207 PR NO BILLABLE SERVICE THIS VISIT: CPT | Performed by: INTERNAL MEDICINE

## 2024-05-03 PROCEDURE — 2894A VOIDCORRECT: CPT | Performed by: INTERNAL MEDICINE

## 2024-05-03 RX ORDER — INDOMETHACIN 25 MG/1
25 CAPSULE ORAL 2 TIMES DAILY WITH MEALS
Qty: 10 CAPSULE | Refills: 0 | Status: SHIPPED | OUTPATIENT
Start: 2024-05-03 | End: 2024-05-08

## 2024-05-03 NOTE — PATIENT INSTRUCTIONS
Thank you for choosing us for your care. I have placed an order for a prescription so that you can start treatment. View your full visit summary for details by clicking on the link below. Your pharmacist will able to address any questions you may have about the medication.     If you're not feeling better within 5-7 days, please schedule an appointment.  You can schedule an appointment right here in [facility], or call [phone]  If the visit is for the same symptoms as your eVisit, we'll refund the cost of your eVisit if seen within seven days.

## 2024-06-16 ENCOUNTER — HEALTH MAINTENANCE LETTER (OUTPATIENT)
Age: 59
End: 2024-06-16

## 2024-06-17 ENCOUNTER — MYC MEDICAL ADVICE (OUTPATIENT)
Dept: FAMILY MEDICINE | Facility: CLINIC | Age: 59
End: 2024-06-17
Payer: COMMERCIAL

## 2024-06-17 NOTE — TELEPHONE ENCOUNTER
RN to verify if he is taking any medications.  Is he seeing a counselor? I can only clear him until his appointment

## 2024-06-17 NOTE — TELEPHONE ENCOUNTER
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   He has an appt with you on Aug 7th.

## 2024-06-17 NOTE — TELEPHONE ENCOUNTER
Patient calling regarding status of this. He needs Dr. Fierro to sign off stating it is okay to for him to continue driving with current medications he is taking. This clearance should go to [facility]. This will  after today. If Dr. Fierro unable to do it today, is there another provider that can?     Patient has appt  .     PRINCESS Mcelroy

## 2024-06-18 NOTE — TELEPHONE ENCOUNTER
Discussed with Chiropractor, stating I have not seen patient since 7/2021 and have not Rx any meds for him since then.  I cannot comment on his current health state since I have not seen him in nearly 3 years.  Chiropractor will take the issue from here, closing encounter

## 2024-06-18 NOTE — TELEPHONE ENCOUNTER
Pt returns call.  States he is not on any medication and does not see a therapist. Pt lost his insurance and was incarcerated for a short time and states he has no depression symptoms anymore.     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     Today is his last day he is approved to drive so needs done asap.     I adv would only be valid until August appt if approved.       Erick Gr RN

## 2024-08-07 ENCOUNTER — OFFICE VISIT (OUTPATIENT)
Dept: FAMILY MEDICINE | Facility: CLINIC | Age: 59
End: 2024-08-07
Payer: COMMERCIAL

## 2024-08-07 VITALS
WEIGHT: 205.1 LBS | BODY MASS INDEX: 29.36 KG/M2 | HEART RATE: 69 BPM | TEMPERATURE: 97.6 F | SYSTOLIC BLOOD PRESSURE: 106 MMHG | DIASTOLIC BLOOD PRESSURE: 80 MMHG | RESPIRATION RATE: 16 BRPM | HEIGHT: 70 IN | OXYGEN SATURATION: 98 %

## 2024-08-07 DIAGNOSIS — R73.03 PRE-DIABETES: ICD-10-CM

## 2024-08-07 DIAGNOSIS — K75.81 NONALCOHOLIC STEATOHEPATITIS (NASH): ICD-10-CM

## 2024-08-07 DIAGNOSIS — M1A.09X0 IDIOPATHIC CHRONIC GOUT OF MULTIPLE SITES WITHOUT TOPHUS: ICD-10-CM

## 2024-08-07 DIAGNOSIS — F32.1 MODERATE SINGLE CURRENT EPISODE OF MAJOR DEPRESSIVE DISORDER (H): ICD-10-CM

## 2024-08-07 DIAGNOSIS — E78.5 HYPERLIPIDEMIA LDL GOAL <160: ICD-10-CM

## 2024-08-07 DIAGNOSIS — R73.03 PRE-DIABETES: Primary | ICD-10-CM

## 2024-08-07 DIAGNOSIS — Z00.00 ROUTINE GENERAL MEDICAL EXAMINATION AT A HEALTH CARE FACILITY: Primary | ICD-10-CM

## 2024-08-07 LAB
ALBUMIN SERPL BCG-MCNC: 4.2 G/DL (ref 3.5–5.2)
ALP SERPL-CCNC: 79 U/L (ref 40–150)
ALT SERPL W P-5'-P-CCNC: 26 U/L (ref 0–70)
ANION GAP SERPL CALCULATED.3IONS-SCNC: 12 MMOL/L (ref 7–15)
AST SERPL W P-5'-P-CCNC: 21 U/L (ref 0–45)
BILIRUB SERPL-MCNC: 0.3 MG/DL
BUN SERPL-MCNC: 16.4 MG/DL (ref 8–23)
CALCIUM SERPL-MCNC: 9.2 MG/DL (ref 8.8–10.4)
CHLORIDE SERPL-SCNC: 102 MMOL/L (ref 98–107)
CHOLEST SERPL-MCNC: 209 MG/DL
CREAT SERPL-MCNC: 1.19 MG/DL (ref 0.67–1.17)
EGFRCR SERPLBLD CKD-EPI 2021: 70 ML/MIN/1.73M2
ERYTHROCYTE [DISTWIDTH] IN BLOOD BY AUTOMATED COUNT: 11.9 % (ref 10–15)
FASTING STATUS PATIENT QL REPORTED: YES
FASTING STATUS PATIENT QL REPORTED: YES
GLUCOSE SERPL-MCNC: 117 MG/DL (ref 70–99)
HBA1C MFR BLD: 6.3 % (ref 0–5.6)
HCO3 SERPL-SCNC: 25 MMOL/L (ref 22–29)
HCT VFR BLD AUTO: 45.8 % (ref 40–53)
HDLC SERPL-MCNC: 34 MG/DL
HGB BLD-MCNC: 15.1 G/DL (ref 13.3–17.7)
HOLD SPECIMEN: NORMAL
HOLD SPECIMEN: NORMAL
LDLC SERPL CALC-MCNC: 136 MG/DL
MCH RBC QN AUTO: 29.8 PG (ref 26.5–33)
MCHC RBC AUTO-ENTMCNC: 33 G/DL (ref 31.5–36.5)
MCV RBC AUTO: 90 FL (ref 78–100)
NONHDLC SERPL-MCNC: 175 MG/DL
PLATELET # BLD AUTO: 222 10E3/UL (ref 150–450)
POTASSIUM SERPL-SCNC: 4.2 MMOL/L (ref 3.4–5.3)
PROT SERPL-MCNC: 7.3 G/DL (ref 6.4–8.3)
RBC # BLD AUTO: 5.07 10E6/UL (ref 4.4–5.9)
SODIUM SERPL-SCNC: 139 MMOL/L (ref 135–145)
TRIGL SERPL-MCNC: 196 MG/DL
URATE SERPL-MCNC: 9.1 MG/DL (ref 3.4–7)
WBC # BLD AUTO: 7.5 10E3/UL (ref 4–11)

## 2024-08-07 PROCEDURE — 85027 COMPLETE CBC AUTOMATED: CPT | Performed by: INTERNAL MEDICINE

## 2024-08-07 PROCEDURE — 80061 LIPID PANEL: CPT | Performed by: INTERNAL MEDICINE

## 2024-08-07 PROCEDURE — 99396 PREV VISIT EST AGE 40-64: CPT | Performed by: INTERNAL MEDICINE

## 2024-08-07 PROCEDURE — 83036 HEMOGLOBIN GLYCOSYLATED A1C: CPT | Performed by: INTERNAL MEDICINE

## 2024-08-07 PROCEDURE — 99214 OFFICE O/P EST MOD 30 MIN: CPT | Mod: 25 | Performed by: INTERNAL MEDICINE

## 2024-08-07 PROCEDURE — 36415 COLL VENOUS BLD VENIPUNCTURE: CPT | Performed by: INTERNAL MEDICINE

## 2024-08-07 PROCEDURE — 84550 ASSAY OF BLOOD/URIC ACID: CPT | Performed by: INTERNAL MEDICINE

## 2024-08-07 PROCEDURE — 80053 COMPREHEN METABOLIC PANEL: CPT | Performed by: INTERNAL MEDICINE

## 2024-08-07 RX ORDER — ESCITALOPRAM OXALATE 10 MG/1
10 TABLET ORAL DAILY
Qty: 30 TABLET | Refills: 0 | Status: CANCELLED | OUTPATIENT
Start: 2024-08-07

## 2024-08-07 RX ORDER — METFORMIN HCL 500 MG
500 TABLET, EXTENDED RELEASE 24 HR ORAL
Qty: 90 TABLET | Refills: 3 | Status: SHIPPED | OUTPATIENT
Start: 2024-08-07

## 2024-08-07 RX ORDER — ALLOPURINOL 100 MG/1
100 TABLET ORAL DAILY
Qty: 90 TABLET | Refills: 3 | Status: SHIPPED | OUTPATIENT
Start: 2024-08-07

## 2024-08-07 RX ORDER — NAPROXEN 500 MG/1
500 TABLET ORAL DAILY
Qty: 90 TABLET | Refills: 0 | Status: SHIPPED | OUTPATIENT
Start: 2024-08-07 | End: 2024-11-05

## 2024-08-07 SDOH — HEALTH STABILITY: PHYSICAL HEALTH: ON AVERAGE, HOW MANY DAYS PER WEEK DO YOU ENGAGE IN MODERATE TO STRENUOUS EXERCISE (LIKE A BRISK WALK)?: 5 DAYS

## 2024-08-07 SDOH — HEALTH STABILITY: PHYSICAL HEALTH: ON AVERAGE, HOW MANY MINUTES DO YOU ENGAGE IN EXERCISE AT THIS LEVEL?: 120 MIN

## 2024-08-07 ASSESSMENT — ANXIETY QUESTIONNAIRES
3. WORRYING TOO MUCH ABOUT DIFFERENT THINGS: NOT AT ALL
GAD7 TOTAL SCORE: 0
5. BEING SO RESTLESS THAT IT IS HARD TO SIT STILL: NOT AT ALL
GAD7 TOTAL SCORE: 0
IF YOU CHECKED OFF ANY PROBLEMS ON THIS QUESTIONNAIRE, HOW DIFFICULT HAVE THESE PROBLEMS MADE IT FOR YOU TO DO YOUR WORK, TAKE CARE OF THINGS AT HOME, OR GET ALONG WITH OTHER PEOPLE: NOT DIFFICULT AT ALL
6. BECOMING EASILY ANNOYED OR IRRITABLE: NOT AT ALL
7. FEELING AFRAID AS IF SOMETHING AWFUL MIGHT HAPPEN: NOT AT ALL
2. NOT BEING ABLE TO STOP OR CONTROL WORRYING: NOT AT ALL
1. FEELING NERVOUS, ANXIOUS, OR ON EDGE: NOT AT ALL

## 2024-08-07 ASSESSMENT — PAIN SCALES - GENERAL: PAINLEVEL: SEVERE PAIN (6)

## 2024-08-07 ASSESSMENT — PATIENT HEALTH QUESTIONNAIRE - PHQ9
SUM OF ALL RESPONSES TO PHQ QUESTIONS 1-9: 2
SUM OF ALL RESPONSES TO PHQ QUESTIONS 1-9: 2
5. POOR APPETITE OR OVEREATING: NOT AT ALL
10. IF YOU CHECKED OFF ANY PROBLEMS, HOW DIFFICULT HAVE THESE PROBLEMS MADE IT FOR YOU TO DO YOUR WORK, TAKE CARE OF THINGS AT HOME, OR GET ALONG WITH OTHER PEOPLE: NOT DIFFICULT AT ALL

## 2024-08-07 ASSESSMENT — SOCIAL DETERMINANTS OF HEALTH (SDOH): HOW OFTEN DO YOU GET TOGETHER WITH FRIENDS OR RELATIVES?: TWICE A WEEK

## 2024-08-07 NOTE — PATIENT INSTRUCTIONS
Health Care Maintenance  Recommend to fill out a healthcare directive  We will check blood work today-liver enzyme, cholesterol, blood sugar  Recommend pneumonia, shingles, COVID-vaccine    Gout  Will check lab today  Start Allopurinol 100 mg daily  To prevent gout flares while starting long term prophylactic, start Naproxen 500 mg once daily.  Take with food.  Do this for 3 months  If you have a flare up of gout, let me know- can do an E-visit  Check uric acid blood test in ~ 2 months. Orders placed. May need to increase dose of allopurinol if Uric Acid > 7    Mental health  Since you are doing well, no need to resume medication     Patient Education   Preventive Care Advice   This is general advice given by our system to help you stay healthy. However, your care team may have specific advice just for you. Please talk to your care team about your preventive care needs.  Nutrition  Eat 5 or more servings of fruits and vegetables each day.  Try wheat bread, brown rice and whole grain pasta (instead of white bread, rice, and pasta).  Get enough calcium and vitamin D. Check the label on foods and aim for 100% of the RDA (recommended daily allowance).  Lifestyle  Exercise at least 150 minutes each week  (30 minutes a day, 5 days a week).  Do muscle strengthening activities 2 days a week. These help control your weight and prevent disease.  No smoking.  Wear sunscreen to prevent skin cancer.  Have a dental exam and cleaning every 6 months.  Yearly exams  See your health care team every year to talk about:  Any changes in your health.  Any medicines your care team has prescribed.  Preventive care, family planning, and ways to prevent chronic diseases.  Shots (vaccines)   HPV shots (up to age 26), if you've never had them before.  Hepatitis B shots (up to age 59), if you've never had them before.  COVID-19 shot: Get this shot when it's due.  Flu shot: Get a flu shot every year.  Tetanus shot: Get a tetanus shot every 10  years.  Pneumococcal, hepatitis A, and RSV shots: Ask your care team if you need these based on your risk.  Shingles shot (for age 50 and up)  General health tests  Diabetes screening:  Starting at age 35, Get screened for diabetes at least every 3 years.  If you are younger than age 35, ask your care team if you should be screened for diabetes.  Cholesterol test: At age 39, start having a cholesterol test every 5 years, or more often if advised.  Bone density scan (DEXA): At age 50, ask your care team if you should have this scan for osteoporosis (brittle bones).  Hepatitis C: Get tested at least once in your life.  STIs (sexually transmitted infections)  Before age 24: Ask your care team if you should be screened for STIs.  After age 24: Get screened for STIs if you're at risk. You are at risk for STIs (including HIV) if:  You are sexually active with more than one person.  You don't use condoms every time.  You or a partner was diagnosed with a sexually transmitted infection.  If you are at risk for HIV, ask about PrEP medicine to prevent HIV.  Get tested for HIV at least once in your life, whether you are at risk for HIV or not.  Cancer screening tests  Cervical cancer screening: If you have a cervix, begin getting regular cervical cancer screening tests starting at age 21.  Breast cancer scan (mammogram): If you've ever had breasts, begin having regular mammograms starting at age 40. This is a scan to check for breast cancer.  Colon cancer screening: It is important to start screening for colon cancer at age 45.  Have a colonoscopy test every 10 years (or more often if you're at risk) Or, ask your provider about stool tests like a FIT test every year or Cologuard test every 3 years.  To learn more about your testing options, visit:   .  For help making a decision, visit:   https://bit.ly/vh72037.  Prostate cancer screening test: If you have a prostate, ask your care team if a prostate cancer screening test  (PSA) at age 55 is right for you.  Lung cancer screening: If you are a current or former smoker ages 50 to 80, ask your care team if ongoing lung cancer screenings are right for you.  For informational purposes only. Not to replace the advice of your health care provider. Copyright   2023 [employer]. All rights reserved. Clinically reviewed by the [facility]. [employer] 252056 - REV 01/24.

## 2024-08-07 NOTE — PROGRESS NOTES
"Preventive Care Visit  [facility]  No Fierro DO, Internal Medicine  Aug 7, 2024      Assessment & Plan     Routine general medical examination at a health care facility    Nonalcoholic steatohepatitis (WILKINSON)  Check lab  - Comprehensive metabolic panel (BMP + Alb, Alk Phos, ALT, AST, Total. Bili, TP); Future  - Comprehensive metabolic panel (BMP + Alb, Alk Phos, ALT, AST, Total. Bili, TP)    Hyperlipidemia LDL goal <160  Due   - Lipid panel reflex to direct LDL Fasting; Future  - Lipid panel reflex to direct LDL Fasting    Pre-diabetes  If A1c 6-6.4, he would be willing to resume metformin.  Discussed lifestyle changes.  - Hemoglobin A1c; Future  - Hemoglobin A1c    Moderate single current episode of major depressive disorder (H)  Improved and no longer on medications and following with mental health    Idiopathic chronic gout of multiple sites without tophus  Discussion of risk and benefit of prophylaxis with allopurinol.  Because of the frequency of flareups, he would like to start medication.  Discussed short-term prophylaxis with NSAID for 3 months.  Recheck uric acid in 2 months.  If uric acid is above 7, would increase allopurinol  - Uric acid; Future  - CBC with platelets; Future  - **Uric acid FUTURE 2mo; Future  - CBC with platelets  - Uric acid  - naproxen (NAPROSYN) 500 MG tablet; Take 1 tablet (500 mg) by mouth daily for 90 days  - allopurinol (ZYLOPRIM) 100 MG tablet; Take 1 tablet (100 mg) by mouth daily    Patient has been advised of split billing requirements and indicates understanding: Yes        BMI  Estimated body mass index is 29.04 kg/m  as calculated from the following:    Height as of this encounter: 1.79 m (5' 10.47\").    Weight as of this encounter: 93 kg (205 lb 1.6 oz).   Weight management plan: Discussed healthy diet and exercise guidelines    Counseling  Appropriate preventive services were addressed with this patient via screening, questionnaire, " or discussion as appropriate for fall prevention, nutrition, physical activity, Tobacco-use cessation, weight loss and cognition.  Checklist reviewing preventive services available has been given to the patient.  Reviewed patient's diet, addressing concerns and/or questions.   The patient was instructed to see the dentist every 6 months.   He is at risk for psychosocial distress and has been provided with information to reduce risk.           Subjective   Addison is a 59 year old, presenting for the following:  Physical, Depression, Anxiety, Diabetes, and Health Maintenance (No shots)        8/7/2024     7:03 AM   Additional Questions   Roomed by karyna   Accompanied by self         8/7/2024     7:03 AM   Patient Reported Additional Medications   Patient reports taking the following new medications none        Health Care Directive  Patient does not have a Health Care Directive or Living Will: Discussed advance care planning with patient; information given to patient to review.    HPI      Chief Complaint   Patient presents with    Physical    Depression    Anxiety    Diabetes    Health Maintenance     No shots     Mental health  --no longer following with therapist  --is not taking lexapro > 1 year, ran out, did not have side effects   --mental health is well controlled    Gout  --has gout flares 2 x year. No family history of gout  --thinks he was on allopurinol years ago.    Pre-Diabetes Follow-up    How often are you checking your blood sugar? Not at all  What concerns do you have today about your diabetes? None   Do you have any of these symptoms? (Select all that apply)  No numbness or tingling in feet.  No redness, sores or blisters on feet.  No complaints of excessive thirst.  No reports of blurry vision.  No significant changes to weight.  He was started on metformin, took x 1 month but ran out and did not renew.  He would resume the metformin if A1c 6-6.4. no side effects when he took it.      BP Readings  from Last 2 Encounters:   08/07/24 106/80   11/22/22 (!) 134/97     Hemoglobin A1C (%)   Date Value   10/03/2022 6.3 (H)   09/29/2020 6.2 (H)   06/08/2017 5.8     LDL Cholesterol Calculated (mg/dL)   Date Value   08/12/2013 142 (H)   01/12/2010 160 (H)             Depression and Anxiety   How are you doing with your depression since your last visit? Improved feeling better no longer have the symptoms  How are you doing with your anxiety since your last visit?  Improved felling better no longer have the symptoms  Are you having other symptoms that might be associated with depression or anxiety? No  Have you had a significant life event? No   Do you have any concerns with your use of alcohol or other drugs? No    Social History     Tobacco Use    Smoking status: Never    Smokeless tobacco: Never   Vaping Use    Vaping status: Never Used   Substance Use Topics    Alcohol use: Yes     Comment: Occasional    Drug use: No         4/13/2022    10:50 AM 10/3/2022     8:38 AM 8/7/2024     6:44 AM   PHQ   PHQ-9 Total Score 5 4 2   Q9: Thoughts of better off dead/self-harm past 2 weeks Not at all Not at all Not at all         7/27/2021     5:52 PM 4/13/2022    10:50 AM 8/7/2024     7:21 AM   JOSE ALFREDO-7 SCORE   Total Score 14 7 0         8/7/2024     6:44 AM   Last PHQ-9   1.  Little interest or pleasure in doing things 0   2.  Feeling down, depressed, or hopeless 0   3.  Trouble falling or staying asleep, or sleeping too much 1   4.  Feeling tired or having little energy 1   5.  Poor appetite or overeating 0   6.  Feeling bad about yourself 0   7.  Trouble concentrating 0   8.  Moving slowly or restless 0   Q9: Thoughts of better off dead/self-harm past 2 weeks 0   PHQ-9 Total Score 2         8/7/2024     7:21 AM   JOSE ALFREDO-7    1. Feeling nervous, anxious, or on edge 0   2. Not being able to stop or control worrying 0   3. Worrying too much about different things 0   4. Trouble relaxing 0   5. Being so restless that it is hard to sit  still 0   6. Becoming easily annoyed or irritable 0   7. Feeling afraid, as if something awful might happen 0   JOSE ALFREDO-7 Total Score 0   If you checked any problems, how difficult have they made it for you to do your work, take care of things at home, or get along with other people? Not difficult at all       Suicide Assessment Five-step Evaluation and Treatment (SAFE-T)          8/7/2024   General Health   How would you rate your overall physical health? Good   Feel stress (tense, anxious, or unable to sleep) Only a little      (!) STRESS CONCERN      8/7/2024   Nutrition   Three or more servings of calcium each day? (!) NO   Diet: Regular (no restrictions)   How many servings of fruit and vegetables per day? (!) 0-1   How many sweetened beverages each day? (!) 2            8/7/2024   Exercise   Days per week of moderate/strenous exercise 5 days   Average minutes spent exercising at this level 120 min            8/7/2024   Social Factors   Frequency of gathering with friends or relatives Twice a week   Worry food won't last until get money to buy more No   Food not last or not have enough money for food? No   Do you have housing? (Housing is defined as stable permanent housing and does not include staying ouside in a car, in a tent, in an abandoned building, in an overnight shelter, or couch-surfing.) Yes   Are you worried about losing your housing? No   Lack of transportation? No   Unable to get utilities (heat,electricity)? No            8/7/2024   Fall Risk   Fallen 2 or more times in the past year? No   Trouble with walking or balance? No             8/7/2024   Dental   Dentist two times every year? (!) NO             Today's PHQ-9 Score:       8/7/2024     6:44 AM   PHQ-9 SCORE   PHQ-9 Total Score MyChart 2 (Minimal depression)   PHQ-9 Total Score 2         8/7/2024   Substance Use   Alcohol more than 3/day or more than 7/wk No   Do you use any other substances recreationally? No        Social History     Tobacco  "Use    Smoking status: Never    Smokeless tobacco: Never   Vaping Use    Vaping status: Never Used   Substance Use Topics    Alcohol use: Yes     Comment: Occasional    Drug use: No           8/7/2024   STI Screening   New sexual partner(s) since last STI/HIV test? No      Last PSA:   PSA   Date Value Ref Range Status   09/29/2020 1.47 0 - 4 ug/L Final     Comment:     Assay Method:  Chemiluminescence using Siemens Vista analyzer     ASCVD Risk   The ASCVD Risk score (Chapincito SORIANO, et al., 2019) failed to calculate for the following reasons:    Cannot find a previous HDL lab    Cannot find a previous total cholesterol lab           Reviewed and updated as needed this visit by Provider     Meds                Current Outpatient Medications   Medication Sig Dispense Refill    indomethacin (INDOCIN) 25 MG capsule Take 1 capsule (25 mg) by mouth 2 times daily (with meals) for 5 days 10 capsule 0         Review of Systems  Constitutional, neuro, ENT, endocrine, pulmonary, cardiac, gastrointestinal, genitourinary, musculoskeletal, integument and psychiatric systems are negative, except as otherwise noted.     Objective    Exam  /80 (BP Location: Right arm, Patient Position: Sitting, Cuff Size: Adult Regular)   Pulse 69   Temp 97.6  F (36.4  C) (Tympanic)   Resp 16   Ht 1.79 m (5' 10.47\")   Wt 93 kg (205 lb 1.6 oz)   SpO2 98%   BMI 29.04 kg/m     Estimated body mass index is 29.04 kg/m  as calculated from the following:    Height as of this encounter: 1.79 m (5' 10.47\").    Weight as of this encounter: 93 kg (205 lb 1.6 oz).    Physical Exam  GENERAL: alert and no distress  EYES: Eyes grossly normal to inspection, PERRL and conjunctivae and sclerae normal  HENT: ear canals and TM's normal, nose and mouth without ulcers or lesions  NECK: no adenopathy, no asymmetry, masses, or scars  RESP: lungs clear to auscultation - no rales, rhonchi or wheezes  CV: regular rate and rhythm, normal S1 S2, no S3 or S4, " no murmur, click or rub, no peripheral edema  ABDOMEN: soft, nontender, no hepatosplenomegaly, no masses and bowel sounds normal  MS: no gross musculoskeletal defects noted, no edema  SKIN: no suspicious lesions or rashes  NEURO: Normal strength and tone, mentation intact and speech normal  PSYCH: mentation appears normal, affect normal/bright        Signed Electronically by: No Fierro DO

## 2025-07-14 ENCOUNTER — PATIENT OUTREACH (OUTPATIENT)
Dept: CARE COORDINATION | Facility: CLINIC | Age: 60
End: 2025-07-14
Payer: COMMERCIAL

## (undated) DEVICE — SU VICRYL 2-0 SH 27" UND J417H

## (undated) DEVICE — DRSG JUMPSTART ANTIMICROBIAL 4X4" ABS-4004

## (undated) DEVICE — DRSG GAUZE 4X4" TRAY

## (undated) DEVICE — PACK EXTREMITY LATEX FREE SOP32HFFCS

## (undated) DEVICE — BNDG ELASTIC 6" DBL LENGTH UNSTERILE 6611-16

## (undated) DEVICE — DRAPE STERI TOWEL SM 1000

## (undated) DEVICE — BNDG ELASTIC 4" DBL LENGTH UNSTERILE 6611-14

## (undated) DEVICE — SU ETHILON 3-0 FS-1 18" 669H

## (undated) DEVICE — SOL WATER IRRIG 1000ML BOTTLE 07139-09

## (undated) DEVICE — GLOVE PROTEXIS W/NEU-THERA 8.0  2D73TE80

## (undated) DEVICE — DECANTER VIAL 2006S

## (undated) DEVICE — GEL ULTRASOUND AQUASONIC 20GM 01-01

## (undated) DEVICE — DRAPE SHEET REV FOLD 3/4 9349

## (undated) DEVICE — GLOVE PROTEXIS POWDER FREE 7.5 ORTHOPEDIC 2D73ET75

## (undated) DEVICE — SOL NACL 0.9% IRRIG 1000ML BOTTLE 07138-09

## (undated) DEVICE — CAST PADDING 4" STERILE 9044S

## (undated) DEVICE — SU NDL MAYO 1824-2

## (undated) DEVICE — DRSG ADAPTIC 3X3" 6112

## (undated) DEVICE — GLOVE PROTEXIS POWDER FREE 8.0 ORTHOPEDIC 2D73ET80

## (undated) DEVICE — PREP CHLORAPREP 26ML TINTED ORANGE  260815

## (undated) DEVICE — NDL 22GA 1.5"

## (undated) DEVICE — SU ETHILON 3-0 PS-2 18" 1669H

## (undated) DEVICE — CAST PADDING 6" STERILE 9046S

## (undated) DEVICE — SU NDL MAYO INTESTINAL 1823-6D

## (undated) DEVICE — DRSG ABDOMINAL 07 1/2X8" 7197D

## (undated) DEVICE — DRAPE EXTREMITY W/ARMBOARD 29405

## (undated) DEVICE — KIT ARTHREX BIO-TENODESIS AR-1676DS

## (undated) DEVICE — GOWN LG DISP 9515

## (undated) DEVICE — SPLINT FIBERGLASS 4X30" PRE-CUT RESIN 76430

## (undated) DEVICE — KIT ANCHOR ARTHREX SM JOINT BIOCOMP SUTURETAK AR-8934DSC

## (undated) RX ORDER — ONDANSETRON 2 MG/ML
INJECTION INTRAMUSCULAR; INTRAVENOUS
Status: DISPENSED
Start: 2018-04-26

## (undated) RX ORDER — PROPOFOL 10 MG/ML
INJECTION, EMULSION INTRAVENOUS
Status: DISPENSED
Start: 2018-01-25

## (undated) RX ORDER — LIDOCAINE HYDROCHLORIDE AND EPINEPHRINE 10; 10 MG/ML; UG/ML
INJECTION, SOLUTION INFILTRATION; PERINEURAL
Status: DISPENSED
Start: 2019-11-14

## (undated) RX ORDER — DEXAMETHASONE SODIUM PHOSPHATE 10 MG/ML
INJECTION, SOLUTION INTRAMUSCULAR; INTRAVENOUS
Status: DISPENSED
Start: 2019-11-14

## (undated) RX ORDER — BUPIVACAINE HYDROCHLORIDE 5 MG/ML
INJECTION, SOLUTION PERINEURAL
Status: DISPENSED
Start: 2018-04-26

## (undated) RX ORDER — LIDOCAINE HCL/EPINEPHRINE/PF 2%-1:200K
VIAL (ML) INJECTION
Status: DISPENSED
Start: 2018-01-25

## (undated) RX ORDER — DEXAMETHASONE SODIUM PHOSPHATE 10 MG/ML
INJECTION, SOLUTION INTRAMUSCULAR; INTRAVENOUS
Status: DISPENSED
Start: 2018-04-26

## (undated) RX ORDER — LIDOCAINE HYDROCHLORIDE 10 MG/ML
INJECTION, SOLUTION EPIDURAL; INFILTRATION; INTRACAUDAL; PERINEURAL
Status: DISPENSED
Start: 2018-04-26

## (undated) RX ORDER — METHYLPREDNISOLONE ACETATE 40 MG/ML
INJECTION, SUSPENSION INTRA-ARTICULAR; INTRALESIONAL; INTRAMUSCULAR; SOFT TISSUE
Status: DISPENSED
Start: 2019-11-14

## (undated) RX ORDER — LIDOCAINE HYDROCHLORIDE 20 MG/ML
INJECTION, SOLUTION INFILTRATION; PERINEURAL
Status: DISPENSED
Start: 2018-04-26

## (undated) RX ORDER — BUPIVACAINE HYDROCHLORIDE 5 MG/ML
INJECTION, SOLUTION PERINEURAL
Status: DISPENSED
Start: 2018-01-25

## (undated) RX ORDER — LIDOCAINE HYDROCHLORIDE AND EPINEPHRINE 15; 5 MG/ML; UG/ML
INJECTION, SOLUTION EPIDURAL
Status: DISPENSED
Start: 2018-04-26

## (undated) RX ORDER — PROPOFOL 10 MG/ML
INJECTION, EMULSION INTRAVENOUS
Status: DISPENSED
Start: 2018-04-26

## (undated) RX ORDER — LIDOCAINE HYDROCHLORIDE 10 MG/ML
INJECTION, SOLUTION EPIDURAL; INFILTRATION; INTRACAUDAL; PERINEURAL
Status: DISPENSED
Start: 2018-01-25

## (undated) RX ORDER — DEXAMETHASONE SODIUM PHOSPHATE 4 MG/ML
INJECTION, SOLUTION INTRA-ARTICULAR; INTRALESIONAL; INTRAMUSCULAR; INTRAVENOUS; SOFT TISSUE
Status: DISPENSED
Start: 2018-04-26

## (undated) RX ORDER — HYDROMORPHONE HYDROCHLORIDE 1 MG/ML
INJECTION, SOLUTION INTRAMUSCULAR; INTRAVENOUS; SUBCUTANEOUS
Status: DISPENSED
Start: 2018-01-25

## (undated) RX ORDER — ROPIVACAINE HYDROCHLORIDE 5 MG/ML
INJECTION, SOLUTION EPIDURAL; INFILTRATION; PERINEURAL
Status: DISPENSED
Start: 2018-04-26

## (undated) RX ORDER — FENTANYL CITRATE 50 UG/ML
INJECTION, SOLUTION INTRAMUSCULAR; INTRAVENOUS
Status: DISPENSED
Start: 2018-04-26

## (undated) RX ORDER — FENTANYL CITRATE 50 UG/ML
INJECTION, SOLUTION INTRAMUSCULAR; INTRAVENOUS
Status: DISPENSED
Start: 2018-01-25

## (undated) RX ORDER — ROPIVACAINE HYDROCHLORIDE 7.5 MG/ML
INJECTION, SOLUTION EPIDURAL; PERINEURAL
Status: DISPENSED
Start: 2018-01-25

## (undated) RX ORDER — KETOROLAC TROMETHAMINE 30 MG/ML
INJECTION, SOLUTION INTRAMUSCULAR; INTRAVENOUS
Status: DISPENSED
Start: 2018-01-25

## (undated) RX ORDER — BUPIVACAINE HYDROCHLORIDE 5 MG/ML
INJECTION, SOLUTION EPIDURAL; INTRACAUDAL
Status: DISPENSED
Start: 2019-11-14

## (undated) RX ORDER — HYDROMORPHONE HYDROCHLORIDE 1 MG/ML
INJECTION, SOLUTION INTRAMUSCULAR; INTRAVENOUS; SUBCUTANEOUS
Status: DISPENSED
Start: 2018-04-26